# Patient Record
Sex: FEMALE | URBAN - METROPOLITAN AREA
[De-identification: names, ages, dates, MRNs, and addresses within clinical notes are randomized per-mention and may not be internally consistent; named-entity substitution may affect disease eponyms.]

---

## 2019-11-18 ENCOUNTER — INPATIENT (INPATIENT)
Facility: HOSPITAL | Age: 34
LOS: 6 days | Discharge: HOME HEALTH | DRG: 234 | End: 2019-11-25
Attending: THORACIC SURGERY (CARDIOTHORACIC VASCULAR SURGERY) | Admitting: THORACIC SURGERY (CARDIOTHORACIC VASCULAR SURGERY)
Payer: MEDICAID

## 2019-11-18 VITALS
TEMPERATURE: 97 F | SYSTOLIC BLOOD PRESSURE: 138 MMHG | DIASTOLIC BLOOD PRESSURE: 91 MMHG | OXYGEN SATURATION: 99 % | HEART RATE: 89 BPM | RESPIRATION RATE: 16 BRPM

## 2019-11-18 DIAGNOSIS — Z98.891 HISTORY OF UTERINE SCAR FROM PREVIOUS SURGERY: Chronic | ICD-10-CM

## 2019-11-18 DIAGNOSIS — Z30.2 ENCOUNTER FOR STERILIZATION: Chronic | ICD-10-CM

## 2019-11-18 DIAGNOSIS — Z98.890 OTHER SPECIFIED POSTPROCEDURAL STATES: Chronic | ICD-10-CM

## 2019-11-18 LAB
ALBUMIN SERPL ELPH-MCNC: 4.6 G/DL — SIGNIFICANT CHANGE UP (ref 3.3–5)
ALP SERPL-CCNC: 57 U/L — SIGNIFICANT CHANGE UP (ref 40–120)
ALT FLD-CCNC: 27 U/L — SIGNIFICANT CHANGE UP (ref 10–45)
ANION GAP SERPL CALC-SCNC: 13 MMOL/L — SIGNIFICANT CHANGE UP (ref 5–17)
APTT BLD: 52.5 SEC — HIGH (ref 27.5–36.3)
AST SERPL-CCNC: 25 U/L — SIGNIFICANT CHANGE UP (ref 10–40)
BASOPHILS # BLD AUTO: 0.05 K/UL — SIGNIFICANT CHANGE UP (ref 0–0.2)
BASOPHILS NFR BLD AUTO: 0.3 % — SIGNIFICANT CHANGE UP (ref 0–2)
BILIRUB SERPL-MCNC: 0.5 MG/DL — SIGNIFICANT CHANGE UP (ref 0.2–1.2)
BUN SERPL-MCNC: 13 MG/DL — SIGNIFICANT CHANGE UP (ref 7–23)
CALCIUM SERPL-MCNC: 10 MG/DL — SIGNIFICANT CHANGE UP (ref 8.4–10.5)
CHLORIDE SERPL-SCNC: 100 MMOL/L — SIGNIFICANT CHANGE UP (ref 96–108)
CHOLEST SERPL-MCNC: 230 MG/DL — HIGH (ref 10–199)
CK MB CFR SERPL CALC: 6.8 NG/ML — HIGH (ref 0–6.7)
CK SERPL-CCNC: 158 U/L — SIGNIFICANT CHANGE UP (ref 25–170)
CO2 SERPL-SCNC: 23 MMOL/L — SIGNIFICANT CHANGE UP (ref 22–31)
CREAT SERPL-MCNC: 0.39 MG/DL — LOW (ref 0.5–1.3)
CRP SERPL-MCNC: 0.84 MG/DL — HIGH (ref 0–0.4)
EOSINOPHIL # BLD AUTO: 0.2 K/UL — SIGNIFICANT CHANGE UP (ref 0–0.5)
EOSINOPHIL NFR BLD AUTO: 1.4 % — SIGNIFICANT CHANGE UP (ref 0–6)
GLUCOSE SERPL-MCNC: 233 MG/DL — HIGH (ref 70–99)
HBA1C BLD-MCNC: 11 % — HIGH (ref 4–5.6)
HCT VFR BLD CALC: 42.2 % — SIGNIFICANT CHANGE UP (ref 34.5–45)
HDLC SERPL-MCNC: 45 MG/DL — LOW
HGB BLD-MCNC: 14.9 G/DL — SIGNIFICANT CHANGE UP (ref 11.5–15.5)
IMM GRANULOCYTES NFR BLD AUTO: 0.3 % — SIGNIFICANT CHANGE UP (ref 0–1.5)
INR BLD: 1.15 — SIGNIFICANT CHANGE UP (ref 0.88–1.16)
LIPID PNL WITH DIRECT LDL SERPL: 126 MG/DL — HIGH
LYMPHOCYTES # BLD AUTO: 35 % — SIGNIFICANT CHANGE UP (ref 13–44)
LYMPHOCYTES # BLD AUTO: 5.17 K/UL — HIGH (ref 1–3.3)
MCHC RBC-ENTMCNC: 32.4 PG — SIGNIFICANT CHANGE UP (ref 27–34)
MCHC RBC-ENTMCNC: 35.3 GM/DL — SIGNIFICANT CHANGE UP (ref 32–36)
MCV RBC AUTO: 91.7 FL — SIGNIFICANT CHANGE UP (ref 80–100)
MONOCYTES # BLD AUTO: 0.71 K/UL — SIGNIFICANT CHANGE UP (ref 0–0.9)
MONOCYTES NFR BLD AUTO: 4.8 % — SIGNIFICANT CHANGE UP (ref 2–14)
NEUTROPHILS # BLD AUTO: 8.61 K/UL — HIGH (ref 1.8–7.4)
NEUTROPHILS NFR BLD AUTO: 58.2 % — SIGNIFICANT CHANGE UP (ref 43–77)
NRBC # BLD: 0 /100 WBCS — SIGNIFICANT CHANGE UP (ref 0–0)
PLATELET # BLD AUTO: 273 K/UL — SIGNIFICANT CHANGE UP (ref 150–400)
POTASSIUM SERPL-MCNC: 3.8 MMOL/L — SIGNIFICANT CHANGE UP (ref 3.5–5.3)
POTASSIUM SERPL-SCNC: 3.8 MMOL/L — SIGNIFICANT CHANGE UP (ref 3.5–5.3)
PROT SERPL-MCNC: 8.1 G/DL — SIGNIFICANT CHANGE UP (ref 6–8.3)
PROTHROM AB SERPL-ACNC: 13 SEC — HIGH (ref 10–12.9)
RBC # BLD: 4.6 M/UL — SIGNIFICANT CHANGE UP (ref 3.8–5.2)
RBC # FLD: 11.4 % — SIGNIFICANT CHANGE UP (ref 10.3–14.5)
SODIUM SERPL-SCNC: 136 MMOL/L — SIGNIFICANT CHANGE UP (ref 135–145)
TOTAL CHOLESTEROL/HDL RATIO MEASUREMENT: 5.1 RATIO — SIGNIFICANT CHANGE UP (ref 3.3–7.1)
TRIGL SERPL-MCNC: 294 MG/DL — HIGH (ref 10–149)
TROPONIN T SERPL-MCNC: 0.23 NG/ML — CRITICAL HIGH (ref 0–0.01)
WBC # BLD: 14.78 K/UL — HIGH (ref 3.8–10.5)
WBC # FLD AUTO: 14.78 K/UL — HIGH (ref 3.8–10.5)

## 2019-11-18 PROCEDURE — 93458 L HRT ARTERY/VENTRICLE ANGIO: CPT | Mod: 26

## 2019-11-18 PROCEDURE — 93010 ELECTROCARDIOGRAM REPORT: CPT

## 2019-11-18 RX ORDER — INFLUENZA VIRUS VACCINE 15; 15; 15; 15 UG/.5ML; UG/.5ML; UG/.5ML; UG/.5ML
0.5 SUSPENSION INTRAMUSCULAR ONCE
Refills: 0 | Status: DISCONTINUED | OUTPATIENT
Start: 2019-11-18 | End: 2019-11-21

## 2019-11-18 RX ORDER — INSULIN LISPRO 100/ML
VIAL (ML) SUBCUTANEOUS
Refills: 0 | Status: DISCONTINUED | OUTPATIENT
Start: 2019-11-18 | End: 2019-11-21

## 2019-11-18 RX ORDER — GLUCAGON INJECTION, SOLUTION 0.5 MG/.1ML
1 INJECTION, SOLUTION SUBCUTANEOUS ONCE
Refills: 0 | Status: DISCONTINUED | OUTPATIENT
Start: 2019-11-18 | End: 2019-11-21

## 2019-11-18 RX ORDER — DEXTROSE 50 % IN WATER 50 %
12.5 SYRINGE (ML) INTRAVENOUS ONCE
Refills: 0 | Status: DISCONTINUED | OUTPATIENT
Start: 2019-11-18 | End: 2019-11-21

## 2019-11-18 RX ORDER — ASPIRIN/CALCIUM CARB/MAGNESIUM 324 MG
81 TABLET ORAL DAILY
Refills: 0 | Status: DISCONTINUED | OUTPATIENT
Start: 2019-11-18 | End: 2019-11-21

## 2019-11-18 RX ORDER — INSULIN LISPRO 100/ML
6 VIAL (ML) SUBCUTANEOUS ONCE
Refills: 0 | Status: COMPLETED | OUTPATIENT
Start: 2019-11-18 | End: 2019-11-18

## 2019-11-18 RX ORDER — SODIUM CHLORIDE 9 MG/ML
1000 INJECTION INTRAMUSCULAR; INTRAVENOUS; SUBCUTANEOUS
Refills: 0 | Status: DISCONTINUED | OUTPATIENT
Start: 2019-11-18 | End: 2019-11-19

## 2019-11-18 RX ORDER — PANTOPRAZOLE SODIUM 20 MG/1
40 TABLET, DELAYED RELEASE ORAL
Refills: 0 | Status: DISCONTINUED | OUTPATIENT
Start: 2019-11-18 | End: 2019-11-21

## 2019-11-18 RX ORDER — LISINOPRIL 2.5 MG/1
2.5 TABLET ORAL DAILY
Refills: 0 | Status: DISCONTINUED | OUTPATIENT
Start: 2019-11-19 | End: 2019-11-19

## 2019-11-18 RX ORDER — POTASSIUM CHLORIDE 20 MEQ
20 PACKET (EA) ORAL ONCE
Refills: 0 | Status: COMPLETED | OUTPATIENT
Start: 2019-11-18 | End: 2019-11-18

## 2019-11-18 RX ORDER — CHLORHEXIDINE GLUCONATE 213 G/1000ML
1 SOLUTION TOPICAL ONCE
Refills: 0 | Status: COMPLETED | OUTPATIENT
Start: 2019-11-18 | End: 2019-11-20

## 2019-11-18 RX ORDER — DEXTROSE 50 % IN WATER 50 %
25 SYRINGE (ML) INTRAVENOUS ONCE
Refills: 0 | Status: DISCONTINUED | OUTPATIENT
Start: 2019-11-18 | End: 2019-11-21

## 2019-11-18 RX ORDER — SODIUM CHLORIDE 9 MG/ML
1000 INJECTION, SOLUTION INTRAVENOUS
Refills: 0 | Status: DISCONTINUED | OUTPATIENT
Start: 2019-11-18 | End: 2019-11-19

## 2019-11-18 RX ORDER — CLOPIDOGREL BISULFATE 75 MG/1
225 TABLET, FILM COATED ORAL ONCE
Refills: 0 | Status: COMPLETED | OUTPATIENT
Start: 2019-11-18 | End: 2019-11-18

## 2019-11-18 RX ORDER — SODIUM CHLORIDE 9 MG/ML
500 INJECTION INTRAMUSCULAR; INTRAVENOUS; SUBCUTANEOUS
Refills: 0 | Status: DISCONTINUED | OUTPATIENT
Start: 2019-11-18 | End: 2019-11-19

## 2019-11-18 RX ORDER — DEXTROSE 50 % IN WATER 50 %
15 SYRINGE (ML) INTRAVENOUS ONCE
Refills: 0 | Status: DISCONTINUED | OUTPATIENT
Start: 2019-11-18 | End: 2019-11-21

## 2019-11-18 RX ORDER — ATORVASTATIN CALCIUM 80 MG/1
80 TABLET, FILM COATED ORAL AT BEDTIME
Refills: 0 | Status: DISCONTINUED | OUTPATIENT
Start: 2019-11-18 | End: 2019-11-21

## 2019-11-18 RX ORDER — METOPROLOL TARTRATE 50 MG
25 TABLET ORAL
Refills: 0 | Status: DISCONTINUED | OUTPATIENT
Start: 2019-11-18 | End: 2019-11-21

## 2019-11-18 RX ADMIN — Medication 20 MILLIEQUIVALENT(S): at 19:31

## 2019-11-18 RX ADMIN — Medication 81 MILLIGRAM(S): at 17:15

## 2019-11-18 RX ADMIN — SODIUM CHLORIDE 75 MILLILITER(S): 9 INJECTION INTRAMUSCULAR; INTRAVENOUS; SUBCUTANEOUS at 18:45

## 2019-11-18 RX ADMIN — Medication 4: at 18:55

## 2019-11-18 RX ADMIN — CLOPIDOGREL BISULFATE 225 MILLIGRAM(S): 75 TABLET, FILM COATED ORAL at 17:15

## 2019-11-18 RX ADMIN — ATORVASTATIN CALCIUM 80 MILLIGRAM(S): 80 TABLET, FILM COATED ORAL at 22:02

## 2019-11-18 RX ADMIN — PANTOPRAZOLE SODIUM 40 MILLIGRAM(S): 20 TABLET, DELAYED RELEASE ORAL at 22:02

## 2019-11-18 RX ADMIN — Medication 6 UNIT(S): at 22:25

## 2019-11-18 NOTE — H&P ADULT - RS GEN PE MLT RESP DETAILS PC
no intercostal retractions/no rales/no chest wall tenderness/airway obstructed/airway patent/clear to auscultation bilaterally/no subcutaneous emphysema/breath sounds equal/normal/respirations non-labored/no rhonchi/no wheezes/good air movement

## 2019-11-18 NOTE — H&P ADULT - ASSESSMENT
35 y/o F FORMER SMOKER with PMH of  Type 2 DM, arthritis, GERD who is now transferred to [facility] for cardiac cath  2/2 pt. risk factors, CCS class 4  sx and R/I NSTEMI.    H/H . Pt denies bleeding, GI bleeding, hematemesis, hematuria, BRBPR or melena . ASA … and Plavix … pre-cath.  Cr. IV NS@ cc/hr pre-cath.  Risks & benefits of procedure and alternative therapy have been explained to the patient including but not limited to: allergic reaction, bleeding w/possible need for blood transfusion, infection, renal and vascular compromise, limb damage, arrhythmia, stroke, vessel dissection/perforation, Myocardial infarction, emergent CABG. Informed consent obtained and in chart 35 y/o F FORMER SMOKER with PMH of  Type 2 DM, arthritis, GERD who is now transferred to [facility] for cardiac cath  2/2 pt. risk factors, CCS class 4  sx and R/I NSTEMI.    H/H . Pt denies bleeding, GI bleeding, hematemesis, hematuria, BRBPR or melena .At [facility]; Pt.  loaded with  mg PO X 1 and Plavix 300 mg PO X 1 on 11/17/19 and  received  ASA 81 ng PO x1 and Plavix 75 mg PO x 1 today am; will give aditional ASA 81 mg PO X 1 and Plavix 225 mg Po x 1 to complete loading dose  Cr. IV NS@ 75 cc/hr pre-cath.  At [facility];  H/H 14.4/41.5; INR 1.01; Cr 0.44 today am at [facility].   Risks & benefits of procedure and alternative therapy have been explained to the patient including but not limited to: allergic reaction, bleeding w/possible need for blood transfusion, infection, renal and vascular compromise, limb damage, arrhythmia, stroke, vessel dissection/perforation, Myocardial infarction, emergent CABG. Informed consent obtained and in chart 33 y/o F FORMER SMOKER with PMH of  Type 2 DM, arthritis, GERD who is now transferred to [facility] for cardiac cath  2/2 pt. risk factors, CCS class 4  sx and R/I NSTEMI.    H/H 14.9/42.2 . Pt denies bleeding, GI bleeding, hematemesis, hematuria, BRBPR or melena .At [facility]; Pt. loaded with  mg PO X 1 and Plavix 300 mg PO X 1 on 11/17/19 and  received  ASA 81 ng PO x1 and Plavix 75 mg PO x 1 today am; pt. given aditional ASA 81 mg PO X 1 and Plavix 225 mg Po x 1 to complete loading dose  Cr. 0.39; IV NS@ 75 cc/hr pre-cath.  WBC 14; but pt. afebrile and asx. Dr. Lucero aware.  urine pregnancy test negative at [facility]    Risks & benefits of procedure and alternative therapy have been explained to the patient including but not limited to: allergic reaction, bleeding w/possible need for blood transfusion, infection, renal and vascular compromise, limb damage, arrhythmia, stroke, vessel dissection/perforation, Myocardial infarction, emergent CABG. Informed consent obtained and in chart

## 2019-11-18 NOTE — H&P ADULT - NSHPLABSRESULTS_GEN_ALL_CORE
14.9   14.78 )-----------( 273      ( 18 Nov 2019 17:02 )             42.2   11-18    136  |  100  |  13  ----------------------------<  233<H>  3.8   |  23  |  0.39<L>    Ca    10.0      18 Nov 2019 17:02    TPro  8.1  /  Alb  4.6  /  TBili  0.5  /  DBili  <0.2  /  AST  25  /  ALT  27  /  AlkPhos  57  11-18  PT/INR - ( 18 Nov 2019 17:02 )   PT: 13.0 sec;   INR: 1.15          PTT - ( 18 Nov 2019 17:02 )  PTT:52.5 sec

## 2019-11-18 NOTE — H&P ADULT - NSICDXPASTMEDICALHX_GEN_ALL_CORE_FT
PAST MEDICAL HISTORY:  Type 2 diabetes mellitus Diabetes PAST MEDICAL HISTORY:  H/O gastroesophageal reflux (GERD)     Type 2 diabetes mellitus Diabetes

## 2019-11-18 NOTE — H&P ADULT - NSICDXFAMILYHX_GEN_ALL_CORE_FT
FAMILY HISTORY:  FH: CAD (coronary artery disease), mom; grandmother FAMILY HISTORY:  Family history of CABG, mom (age 50s)  FH: CAD (coronary artery disease), mom; grandmother  FH: myocardial infarction, mom: age 40s s/p PCI

## 2019-11-18 NOTE — H&P ADULT - HISTORY OF PRESENT ILLNESS
SKELETON !!!    35 y/o F FORMER SMOKER with PMH of  Type 2 DM, arthritis, GERD who initially presented to [facility] 11/17/19 c/o CP X 1 day. Pt. reports that the CP is "pressure" like sensation radiating to b/l UE which is associated with SOB; rates it as 10/10 which started the night before she went to [facility]. At baseline; pt. reports of having CP on exertion for the past 2-3 years but is now having CP at rest which is relieved with SLG NTG  Pt denies dizziness, diaphoresis, fatigue, LE edema, palpitations, orthopnea, PND, syncope  At [facility] ; pt ruled in NSTEMI and was admitted for further management of NSTEMI. Troponin I 0.2333> 3.370; + THC in urine; Hg 13.6; negative urine test for pregnancy; EKG as per [facility] NSR with no ST-T changes noted; At [place] pt was started on Hep gtt for NSTEMI; started on ASA 81 ng daily; Plavix 75 mg daily and Lipitor 80 mg daily; ? ASA/Plavix load.   Pt is now transferred to [facility] for cardiac cath  2/2 pt. risk factors, CCS class 4  sx and R/I NSTEMI.    On arrival to [facility]; Pt denies … CP, SOB, dizziness, diaphoresis, fatigue, LE edema, palpitations, orthopnea, PND, syncope  BP, HR, RR, T, O2  EKG: SKELETON !!!    35 y/o F FORMER SMOKER with PMH of  Type 2 DM, arthritis, GERD who initially presented to [facility] 11/17/19 c/o CP X 1 day. Pt. reports that the CP is "pressure" like sensation radiating to b/l UE which is associated with SOB; rates it as 10/10 which started the night before she went to [facility]. At baseline; pt. reports of having CP on exertion for the past 2-3 years but is now having CP at rest which is relieved with SLG NTG  Pt denies dizziness, diaphoresis, fatigue, LE edema, palpitations, orthopnea, PND, syncope  At [facility] ; pt ruled in NSTEMI and was admitted for further management of NSTEMI. Troponin I 0.2333> 3.370> 5.3> 8.73 >5.6; + THC in urine; Hg 13.6; negative urine test for pregnancy; EKG as per [facility] NSR with no ST-T changes noted; At [facility] pt was started on Hep gtt for NSTEMI; loaded with  mg PO X 1 and Plavix 300 mg PO X 1 on 11/17/19 and  started on ASA 81 ng daily; Plavix 75 mg daily; Lopressor 25 mg BID; Lisinopril 2.5 mg daily and Lipitor 80 mg daily.   Pt is now transferred to [facility] for cardiac cath  2/2 pt. risk factors, CCS class 4  sx and R/I NSTEMI.    On arrival to [facility]; Pt denies … CP, SOB, dizziness, diaphoresis, fatigue, LE edema, palpitations, orthopnea, PND, syncope  BP, HR, RR, T, O2  EKG: SKELETON !!!    35 y/o F  CURRENT SMOKER with strong FHX of CAD; PMH of  Type 2 DM, arthritis, GERD who initially presented to [facility] 11/17/19 c/o CP X 1 day. Pt. reports that the CP is "pressure" like sensation radiating to b/l UE which is associated with SOB; rates it as 10/10 which started the night before she went to [facility]. At baseline; pt. reports of having CP on exertion for the past 2-3 years but is now having CP at rest which is relieved with SLG NTG  Pt denies dizziness, diaphoresis, fatigue, LE edema, palpitations, orthopnea, PND, syncope  At [facility] ; pt ruled in NSTEMI and was admitted for further management of NSTEMI. Troponin I 0.2333> 3.370> 5.3> 8.73 >5.6; + THC in urine; Hg 13.6; negative urine test for pregnancy; EKG as per [facility] NSR with no ST-T changes noted; At [place] pt was started on Hep gtt for NSTEMI; loaded with  mg PO X 1 and Plavix 300 mg PO X 1 on 11/17/19 and  started on ASA 81 ng daily; Plavix 75 mg daily; Lopressor 25 mg BID; Lisinopril 2.5 mg daily and Lipitor 80 mg daily.   Pt is now transferred to [facility] for cardiac cath  2/2 pt. risk factors, CCS class 4  sx and R/I NSTEMI.    On arrival to [facility]; Pt denies … CP, SOB, dizziness, diaphoresis, fatigue, LE edema, palpitations, orthopnea, PND, syncope  /91, HR 89, RR 16, T 97.2 O2 99 RA  EKG:  NSR @ 74 bpm; q wave inferior leads; LAD; no ST-T changes noted 33 y/o F  CURRENT SMOKER' + Marijuana use with strong FHX of CAD ( MI mom at age 40; CABG mom at age 50); PMH of  Type 2 DM, arthritis, GERD who initially presented to [facility] 11/17/19 c/o CP X 1 day. Pt. reports that Saturday night; she had severe CP that woke her up from her sleep; pt. reports that the CP was mid-sternal; "pressure-burning" like sensation radiating to B/L UE associated with SOB; rates it as 10/10; not worse with inspiration or movement. She first thought that it might be 2/2 her GERD; took protonix and milk with no improvement in her sx which prompted her to go to the ED.  At baseline; pt. reports of having CP associated with SOB on climbing 2-3 flights of stairs which is worse with exertion and relieved with rest occuring for the last few months; She reports that her PCP was supposed to give her referral to see a cardiologist.  Pt denies dizziness, diaphoresis, fatigue, LE edema, palpitations, orthopnea, PND, syncope At [facility] ; pt ruled in NSTEMI and was admitted for further management of NSTEMI. Troponin I 0.2333> 3.370> 5.3> 8.73 >5.6; + THC in urine; Hg 13.6; negative urine test for pregnancy; EKG as per [place] paperwork NSR with no ST-T changes noted; At [place] pt was started on Hep gtt for NSTEMI; loaded with  mg PO X 1 and Plavix 300 mg PO X 1 on 11/17/19 and  started on ASA 81 ng daily; Plavix 75 mg daily; Lopressor 25 mg BID; Lisinopril 2.5 mg daily and Lipitor 80 mg daily.  Pt is now transferred to [facility] for cardiac cath  2/2 pt. risk factors, CCS class 4  sx and R/I NSTEMI.  On arrival to [facility]; Pt denies any CP, SOB, dizziness, diaphoresis, fatigue, LE edema, palpitations, orthopnea, PND, syncope; recent sick contacts/illness; URI or UTI sx. /91, HR 89, RR 16, T 97.2 O2 99 RA; EKG:  NSR @ 74 bpm; q wave inferior leads; LAD; no ST-T changes noted.   Transfer meds: ASA 81 mg daily; Plavix 75 mg daily; Lipitor 80 mg daily; Lisinopril 2.5 mg daily and Lopressor 25 mg BID.

## 2019-11-18 NOTE — H&P ADULT - ATTENDING COMMENTS
See PA note written above, for details. I reviewed the PA documentation.  I reviewed vitals, labs, medications, cardiac studies and additional imaging.  I agree with the PA's findings and plans as written above with the following additions/amendments:  34F FORMER SMOKER with Uncontrolled Type 2 DM HbA1c 11%, and GERD who transferred from [place] for management of NSTEMI.  [facility] with multivessel CAD. CTSx consulted.  ROS: Patient denies cardiopulmonary symptoms. The patient specifically denies CP, SOB, MALONEY, LH, dizziness, palpitations.  Physical Exam notable for: young female sitting up in bed in NAD, flat neck veins, RRR, no MGR detected, clear lungs, overly nourished abdomen, NTTP, no pretibial pitting edema, no ankle edema, skin WWP throughout, A&Ox3    Plan for:  Heparin gtt pending CTsx  ASA 81mg daily  High intensity statin Atorva 80  Metoprolol 25 BID  Obtain TTE for structural assessment  Pre operative testing ordered  CTSx consulting for CABG evaluation  Endocrine consultation for uncontrolled DM A1c 11%  Smoking cessation counseling  Patient to be referred to cardiac rehabilitation upon discharge for cardiac conditioning  JAMESON Ness.  Cardiology Attending

## 2019-11-18 NOTE — H&P ADULT - NSHPSOCIALHISTORY_GEN_ALL_CORE
former smoker current smoker; smoked 1 PPD X 15 years  smokes marijuana occasionally; last use 11/16/19  denies any ETOH use

## 2019-11-19 DIAGNOSIS — E78.5 HYPERLIPIDEMIA, UNSPECIFIED: ICD-10-CM

## 2019-11-19 DIAGNOSIS — F17.200 NICOTINE DEPENDENCE, UNSPECIFIED, UNCOMPLICATED: ICD-10-CM

## 2019-11-19 DIAGNOSIS — E11.9 TYPE 2 DIABETES MELLITUS WITHOUT COMPLICATIONS: ICD-10-CM

## 2019-11-19 DIAGNOSIS — Z29.9 ENCOUNTER FOR PROPHYLACTIC MEASURES, UNSPECIFIED: ICD-10-CM

## 2019-11-19 DIAGNOSIS — I21.4 NON-ST ELEVATION (NSTEMI) MYOCARDIAL INFARCTION: ICD-10-CM

## 2019-11-19 DIAGNOSIS — I10 ESSENTIAL (PRIMARY) HYPERTENSION: ICD-10-CM

## 2019-11-19 LAB
ANION GAP SERPL CALC-SCNC: 15 MMOL/L — SIGNIFICANT CHANGE UP (ref 5–17)
APTT BLD: 42.4 SEC — HIGH (ref 27.5–36.3)
APTT BLD: 51.7 SEC — HIGH (ref 27.5–36.3)
APTT BLD: 90.9 SEC — HIGH (ref 27.5–36.3)
BUN SERPL-MCNC: 15 MG/DL — SIGNIFICANT CHANGE UP (ref 7–23)
CALCIUM SERPL-MCNC: 9.3 MG/DL — SIGNIFICANT CHANGE UP (ref 8.4–10.5)
CHLORIDE SERPL-SCNC: 100 MMOL/L — SIGNIFICANT CHANGE UP (ref 96–108)
CO2 SERPL-SCNC: 21 MMOL/L — LOW (ref 22–31)
CREAT SERPL-MCNC: 0.44 MG/DL — LOW (ref 0.5–1.3)
GLUCOSE BLDC GLUCOMTR-MCNC: 208 MG/DL — HIGH (ref 70–99)
GLUCOSE BLDC GLUCOMTR-MCNC: 226 MG/DL — HIGH (ref 70–99)
GLUCOSE BLDC GLUCOMTR-MCNC: 264 MG/DL — HIGH (ref 70–99)
GLUCOSE SERPL-MCNC: 272 MG/DL — HIGH (ref 70–99)
HBA1C BLD-MCNC: 10.6 % — HIGH (ref 4–5.6)
HCT VFR BLD CALC: 43.7 % — SIGNIFICANT CHANGE UP (ref 34.5–45)
HGB BLD-MCNC: 15 G/DL — SIGNIFICANT CHANGE UP (ref 11.5–15.5)
MAGNESIUM SERPL-MCNC: 1.9 MG/DL — SIGNIFICANT CHANGE UP (ref 1.6–2.6)
MCHC RBC-ENTMCNC: 32.3 PG — SIGNIFICANT CHANGE UP (ref 27–34)
MCHC RBC-ENTMCNC: 34.3 GM/DL — SIGNIFICANT CHANGE UP (ref 32–36)
MCV RBC AUTO: 94.2 FL — SIGNIFICANT CHANGE UP (ref 80–100)
NRBC # BLD: 0 /100 WBCS — SIGNIFICANT CHANGE UP (ref 0–0)
PLATELET # BLD AUTO: 280 K/UL — SIGNIFICANT CHANGE UP (ref 150–400)
POTASSIUM SERPL-MCNC: 3.8 MMOL/L — SIGNIFICANT CHANGE UP (ref 3.5–5.3)
POTASSIUM SERPL-SCNC: 3.8 MMOL/L — SIGNIFICANT CHANGE UP (ref 3.5–5.3)
RBC # BLD: 4.64 M/UL — SIGNIFICANT CHANGE UP (ref 3.8–5.2)
RBC # FLD: 11.5 % — SIGNIFICANT CHANGE UP (ref 10.3–14.5)
SODIUM SERPL-SCNC: 136 MMOL/L — SIGNIFICANT CHANGE UP (ref 135–145)
TSH SERPL-MCNC: 1.18 UIU/ML — SIGNIFICANT CHANGE UP (ref 0.35–4.94)
WBC # BLD: 11.74 K/UL — HIGH (ref 3.8–10.5)
WBC # FLD AUTO: 11.74 K/UL — HIGH (ref 3.8–10.5)

## 2019-11-19 PROCEDURE — 93880 EXTRACRANIAL BILAT STUDY: CPT | Mod: 26

## 2019-11-19 PROCEDURE — 99223 1ST HOSP IP/OBS HIGH 75: CPT

## 2019-11-19 PROCEDURE — 94010 BREATHING CAPACITY TEST: CPT | Mod: 26

## 2019-11-19 PROCEDURE — 71046 X-RAY EXAM CHEST 2 VIEWS: CPT | Mod: 26

## 2019-11-19 PROCEDURE — 93010 ELECTROCARDIOGRAM REPORT: CPT

## 2019-11-19 RX ORDER — HEPARIN SODIUM 5000 [USP'U]/ML
INJECTION INTRAVENOUS; SUBCUTANEOUS
Qty: 25000 | Refills: 0 | Status: DISCONTINUED | OUTPATIENT
Start: 2019-11-19 | End: 2019-11-20

## 2019-11-19 RX ORDER — POTASSIUM CHLORIDE 20 MEQ
40 PACKET (EA) ORAL ONCE
Refills: 0 | Status: COMPLETED | OUTPATIENT
Start: 2019-11-19 | End: 2019-11-19

## 2019-11-19 RX ORDER — HEPARIN SODIUM 5000 [USP'U]/ML
5600 INJECTION INTRAVENOUS; SUBCUTANEOUS EVERY 6 HOURS
Refills: 0 | Status: DISCONTINUED | OUTPATIENT
Start: 2019-11-19 | End: 2019-11-20

## 2019-11-19 RX ORDER — INSULIN GLARGINE 100 [IU]/ML
24 INJECTION, SOLUTION SUBCUTANEOUS AT BEDTIME
Refills: 0 | Status: DISCONTINUED | OUTPATIENT
Start: 2019-11-19 | End: 2019-11-20

## 2019-11-19 RX ORDER — INSULIN LISPRO 100/ML
8 VIAL (ML) SUBCUTANEOUS
Refills: 0 | Status: DISCONTINUED | OUTPATIENT
Start: 2019-11-19 | End: 2019-11-20

## 2019-11-19 RX ORDER — MAGNESIUM OXIDE 400 MG ORAL TABLET 241.3 MG
400 TABLET ORAL ONCE
Refills: 0 | Status: COMPLETED | OUTPATIENT
Start: 2019-11-19 | End: 2019-11-19

## 2019-11-19 RX ORDER — HEPARIN SODIUM 5000 [USP'U]/ML
5000 INJECTION INTRAVENOUS; SUBCUTANEOUS ONCE
Refills: 0 | Status: DISCONTINUED | OUTPATIENT
Start: 2019-11-19 | End: 2019-11-19

## 2019-11-19 RX ADMIN — INSULIN GLARGINE 24 UNIT(S): 100 INJECTION, SOLUTION SUBCUTANEOUS at 22:03

## 2019-11-19 RX ADMIN — Medication 6: at 07:24

## 2019-11-19 RX ADMIN — MAGNESIUM OXIDE 400 MG ORAL TABLET 400 MILLIGRAM(S): 241.3 TABLET ORAL at 09:08

## 2019-11-19 RX ADMIN — Medication 8 UNIT(S): at 17:06

## 2019-11-19 RX ADMIN — ATORVASTATIN CALCIUM 80 MILLIGRAM(S): 80 TABLET, FILM COATED ORAL at 21:49

## 2019-11-19 RX ADMIN — HEPARIN SODIUM 1400 UNIT(S)/HR: 5000 INJECTION INTRAVENOUS; SUBCUTANEOUS at 15:35

## 2019-11-19 RX ADMIN — HEPARIN SODIUM 1200 UNIT(S)/HR: 5000 INJECTION INTRAVENOUS; SUBCUTANEOUS at 07:48

## 2019-11-19 RX ADMIN — HEPARIN SODIUM 1200 UNIT(S)/HR: 5000 INJECTION INTRAVENOUS; SUBCUTANEOUS at 22:54

## 2019-11-19 RX ADMIN — Medication 4: at 17:06

## 2019-11-19 RX ADMIN — Medication 6: at 14:46

## 2019-11-19 RX ADMIN — Medication 81 MILLIGRAM(S): at 11:29

## 2019-11-19 RX ADMIN — Medication 4: at 22:03

## 2019-11-19 RX ADMIN — Medication 25 MILLIGRAM(S): at 06:20

## 2019-11-19 RX ADMIN — HEPARIN SODIUM 0 UNIT(S)/HR: 5000 INJECTION INTRAVENOUS; SUBCUTANEOUS at 22:20

## 2019-11-19 RX ADMIN — Medication 25 MILLIGRAM(S): at 17:06

## 2019-11-19 RX ADMIN — PANTOPRAZOLE SODIUM 40 MILLIGRAM(S): 20 TABLET, DELAYED RELEASE ORAL at 06:21

## 2019-11-19 RX ADMIN — HEPARIN SODIUM 1000 UNIT(S)/HR: 5000 INJECTION INTRAVENOUS; SUBCUTANEOUS at 01:33

## 2019-11-19 RX ADMIN — Medication 40 MILLIEQUIVALENT(S): at 09:08

## 2019-11-19 NOTE — CONSULT NOTE ADULT - ATTENDING COMMENTS
A/P: 34y Female with hx of DM presenting for management of CAD undergoing eval for possible CABG    1.  DM - likely type 2, uncontrolled, possible underlying contributors  start 24 units lantus at bedtime, 8 units lispro TID with meals.   Lispro moderate dose scale with meals and at bedtime.   Continue consistent carb diet, Nutrition consult  dilute all medications in NS instead of D5 when possible.   Ensure correct injection and FSG technique    2.  Hyperlipidemia - LDL goal < 70  atorvastatin 80 mg daily  consider PSK-9 inhibitor.     3.  Obesity - outpatient medical management.   would consider GLP-1 agonist in this patient.     4.  Irregular menses/hirsutism - concerning for PCOS, cushings, CAH and likely contributing to uncontrolled DM and insulin resistance.   - check FSH, LH, DHEAS, testosterone, 17-OH progesterone, cortisol, IGF-1    Pt is advised to follow up with me at discharge.  Please call  to schedule an appointment.

## 2019-11-19 NOTE — CONSULT NOTE ADULT - SUBJECTIVE AND OBJECTIVE BOX
HPI: 34yFemale    PMH & Surgical Hx:NSTEMI  Family history of CABG  FH: myocardial infarction  FH: CAD (coronary artery disease)  Handoff  MEWS Score  H/O gastroesophageal reflux (GERD)  Type 2 diabetes mellitus  Prophylactic measure  HLD (hyperlipidemia)  Smoking  HTN (hypertension)  Type 2 diabetes mellitus  NSTEMI (non-ST elevated myocardial infarction)  Encounter for Essure implantation  H/O hernia repair  S/P       FH:  DM:  Thyroid:  Autoimmune:  Other:    SH:  Smoking:  Etoh:  Recreational Drugs:  Social Life:    Current Meds:  aspirin enteric coated 81 milliGRAM(s) Oral daily  atorvastatin 80 milliGRAM(s) Oral at bedtime  chlorhexidine 4% Liquid 1 Application(s) Topical once  dextrose 40% Gel 15 Gram(s) Oral once PRN  dextrose 50% Injectable 12.5 Gram(s) IV Push once  dextrose 50% Injectable 25 Gram(s) IV Push once  dextrose 50% Injectable 25 Gram(s) IV Push once  glucagon  Injectable 1 milliGRAM(s) IntraMuscular once PRN  heparin  Infusion.  Unit(s)/Hr IV Continuous <Continuous>  heparin  Injectable 5600 Unit(s) IV Push every 6 hours PRN  influenza   Vaccine 0.5 milliLiter(s) IntraMuscular once  insulin glargine Injectable (LANTUS) 24 Unit(s) SubCutaneous at bedtime  insulin lispro (HumaLOG) corrective regimen sliding scale   SubCutaneous Before meals and at bedtime  insulin lispro Injectable (HumaLOG) 8 Unit(s) SubCutaneous three times a day with meals  metoprolol tartrate 25 milliGRAM(s) Oral two times a day  pantoprazole    Tablet 40 milliGRAM(s) Oral before breakfast      Allergies:  No Known Allergies      ROS:  Denies the following except as indicated.    General: weight loss/weight gain, decreased appetite, fatigue  Eyes: Blurry vision, double vision, visual changes  ENT: Throat pain, changes in voice,   CV: palpitations, SOB, CP, cough  GI: NVD, difficulty swallowing, abdominal pain  : polyuria, dysuria  Endo: abnormal menses, temperature intolerance, decreased libido  MSK: weakness, joint pain  Skin: rash, dryness, diaphoresis  Heme: Easy bruising,bleeding  Neuro: HA, dizziness, lightheadedness, numbness tingling  Psych: Anxiety, Depression    Vital Signs Last 24 Hrs  T(C): 36.8 (2019 19:08), Max: 36.8 (2019 10:18)  T(F): 98.2 (2019 19:08), Max: 98.3 (2019 10:18)  HR: 92 (2019 20:34) (76 - 100)  BP: 108/72 (2019 20:34) (101/57 - 126/75)  BP(mean): --  RR: 16 (2019 20:34) (16 - 18)  SpO2: 98% (2019 20:34) (97% - 98%)  Height (cm): 175 ( @ 21:23)  Weight (kg): 93 ( @ 21:23)  BMI (kg/m2): 30.4 ( @ 21:23)    Constitutional: wn/wd in NAD.   HEENT: NCAT, MMM, OP clear, EOMI, , no proptosis or lid retraction  Neck: no thyromegaly or palpable thyroid nodules   Respiratory: lungs CTAB.  Cardiovascular: regular rhythm, normal S1 and S2, no audible murmurs  GI: soft, NT/ND, no masses/HSM appreciated.  Neurology: no tremors, DTR 2+  Skin: no visible rashes/lesions  Psychiatric: AAO x 3, normal affect/mood.  Ext: radial pulses intact, DP pulses intact, extremities warm, no cyanosis, clubbing or edema.       LABS:                        15.0   11.74 )-----------( 280      ( 2019 06:36 )             43.7         136  |  100  |  15  ----------------------------<  272<H>  3.8   |  21<L>  |  0.44<L>    Ca    9.3      2019 06:36  Mg     1.9         TPro  8.1  /  Alb  4.6  /  TBili  0.5  /  DBili  <0.2  /  AST  25  /  ALT  27  /  AlkPhos  57      PT/INR - ( 2019 17:02 )   PT: 13.0 sec;   INR: 1.15          PTT - ( 2019 15:02 )  PTT:51.7 sec    Hemoglobin A1C, Whole Blood: 10.6 ( @ 06:36)  Hemoglobin A1C, Whole Blood: 11.0 ( @ 17:02)    Thyroid Stimulating Hormone, Serum: 1.177 ( @ 06:36)  Thyroid Stimulating Hormone, Serum: 1.218 ( 17:02)    Cholesterol, Serum: 230 mg/dL (19 17:02)  HDL Cholesterol, Serum: 45 mg/dL (19 17:02)  Triglycerides, Serum: 294 mg/dL (19 17:02)  Direct LDL: 126 mg/dL (19 @ 17:02)      CAPILLARY BLOOD GLUCOSE      POCT Blood Glucose.: 226 mg/dL (2019 21:36)  POCT Blood Glucose.: 208 mg/dL (2019 17:00)  POCT Blood Glucose.: 264 mg/dL (2019 14:05)  POCT Blood Glucose.: 265 mg/dL (2019 06:50) HPI:  35 y/o F  CURRENT SMOKER' + Marijuana use with strong FHX of CAD ( MI mom at age 40; CABG mom at age 50); PMH of  Type 2 DM, arthritis, GERD who initially presented to [facility] 19 c/o CP X 1 day. Pt. reports that Saturday night; she had severe CP that woke her up from her sleep; pt. reports that the CP was mid-sternal; "pressure-burning" like sensation radiating to B/L UE associated with SOB; rates it as 10/10; not worse with inspiration or movement. She first thought that it might be 2/2 her GERD; took protonix and milk with no improvement in her sx which prompted her to go to the ED.  At baseline; pt. reports of having CP associated with SOB on climbing 2-3 flights of stairs which is worse with exertion and relieved with rest occuring for the last few months; She reports that her PCP was supposed to give her referral to see a cardiologist.  Pt denies dizziness, diaphoresis, fatigue, LE edema, palpitations, orthopnea, PND, syncope At [facility] ; pt ruled in NSTEMI and was admitted for further management of NSTEMI. Troponin I 0.2333> 3.370> 5.3> 8.73 >5.6; + THC in urine; Hg 13.6; negative urine test for pregnancy; EKG as per [facility] paperwork NSR with no ST-T changes noted; At [facility] pt was started on Hep gtt for NSTEMI; loaded with  mg PO X 1 and Plavix 300 mg PO X 1 on 19 and  started on ASA 81 ng daily; Plavix 75 mg daily; Lopressor 25 mg BID; Lisinopril 2.5 mg daily and Lipitor 80 mg daily.  Pt is now transferred to [facility] for cardiac cath  2/2 pt. risk factors, CCS class 4  sx and R/I NSTEMI.  On arrival to [facility]; Pt denies any CP, SOB, dizziness, diaphoresis, fatigue, LE edema, palpitations, orthopnea, PND, syncope; recent sick contacts/illness; URI or UTI sx. /91, HR 89, RR 16, T 97.2 O2 99 RA; EKG:  NSR @ 74 bpm; q wave inferior leads; LAD; no ST-T changes noted.   Transfer meds: ASA 81 mg daily; Plavix 75 mg daily; Lipitor 80 mg daily; Lisinopril 2.5 mg daily and Lopressor 25 mg BID. (2019 11:45)  Pt reports was dx with type 2 DM at age 16, has been on metformin and glipizide at the moment, but required insulin during her prenancies 9 and 10 years ago.  Pt endorses frequent missed doses of medication as she feels that the metformin does not agree with her.  does not check glucose at home.  reports adherence to low carb diet, and regular physical activity.  denies retinopathy, neuropathy, hx of HHS/DKA/hypoglycemia requiring assistance.  Reports she has lost weight over the past few years, but was previously close to 300lbs.   Pt also reports irregular periods, difficulty losing weight, no fractures, no hx of thyroid disorders  Insulin requirements this admission reviewed  pt eating well today      PMH & Surgical Hx:NSTEMI  H/O gastroesophageal reflux (GERD)  Type 2 diabetes mellitus  HLD (hyperlipidemia)  Smoking  HTN (hypertension)  NSTEMI (non-ST elevated myocardial infarction)  Encounter for Essure implantation  H/O hernia repair  S/P     FH:  DM: in siblings  Thyroid: in sister  Autoimmune: denies  Other:    SH:  Smoking: current smoker.  Etoh: denies  Recreational Drugs: + marijuana  Social Life: works as a , two kids    Current Meds:  aspirin enteric coated 81 milliGRAM(s) Oral daily  atorvastatin 80 milliGRAM(s) Oral at bedtime  chlorhexidine 4% Liquid 1 Application(s) Topical once  dextrose 40% Gel 15 Gram(s) Oral once PRN  dextrose 50% Injectable 12.5 Gram(s) IV Push once  dextrose 50% Injectable 25 Gram(s) IV Push once  dextrose 50% Injectable 25 Gram(s) IV Push once  glucagon  Injectable 1 milliGRAM(s) IntraMuscular once PRN  heparin  Infusion.  Unit(s)/Hr IV Continuous <Continuous>  heparin  Injectable 5600 Unit(s) IV Push every 6 hours PRN  influenza   Vaccine 0.5 milliLiter(s) IntraMuscular once  insulin glargine Injectable (LANTUS) 24 Unit(s) SubCutaneous at bedtime  insulin lispro (HumaLOG) corrective regimen sliding scale   SubCutaneous Before meals and at bedtime  insulin lispro Injectable (HumaLOG) 8 Unit(s) SubCutaneous three times a day with meals  metoprolol tartrate 25 milliGRAM(s) Oral two times a day  pantoprazole    Tablet 40 milliGRAM(s) Oral before breakfast      Allergies:  No Known Allergies      ROS:  Denies the following except as indicated.    General: weight loss/weight gain, decreased appetite, fatigue  Eyes: Blurry vision, double vision, visual changes  ENT: Throat pain, changes in voice,   CV: palpitations, SOB, CP, cough  GI: NVD, difficulty swallowing, abdominal pain  : polyuria, dysuria  Endo: abnormal menses, temperature intolerance, decreased libido  MSK: weakness, joint pain  Skin: rash, dryness, diaphoresis  Heme: Easy bruising,bleeding  Neuro: HA, dizziness, lightheadedness, numbness tingling  Psych: Anxiety, Depression    Vital Signs Last 24 Hrs  T(C): 36.8 (2019 19:08), Max: 36.8 (2019 10:18)  T(F): 98.2 (2019 19:08), Max: 98.3 (2019 10:18)  HR: 92 (2019 20:34) (76 - 100)  BP: 108/72 (2019 20:34) (101/57 - 126/75)  BP(mean): --  RR: 16 (2019 20:34) (16 - 18)  SpO2: 98% (2019 20:34) (97% - 98%)  Height (cm): 175 (-18 @ 21:23)  Weight (kg): 93 (18 @ 21:23)  BMI (kg/m2): 30.4 (-18 @ 21:23)    Constitutional: wn/wd in NAD.   HEENT: NCAT, MMM, OP clear, EOMI, , no proptosis or lid retraction  Neck: no thyromegaly or palpable thyroid nodules   Respiratory: lungs CTAB.  Cardiovascular: regular rhythm, normal S1 and S2, no audible murmurs  GI: soft, NT/ND, no masses/HSM appreciated.  Neurology: no tremors, DTR 2+  Skin: no visible rashes/lesions, + significant hirsutism on chin and abdomen  Psychiatric: AAO x 3, normal affect/mood.  Ext: radial pulses intact, DP pulses intact, extremities warm, no cyanosis, clubbing or edema.       LABS:                        15.0   11.74 )-----------( 280      ( 2019 06:36 )             43.7         136  |  100  |  15  ----------------------------<  272<H>  3.8   |  21<L>  |  0.44<L>    Ca    9.3      2019 06:36  Mg     1.9         TPro  8.1  /  Alb  4.6  /  TBili  0.5  /  DBili  <0.2  /  AST  25  /  ALT  27  /  AlkPhos  57      PT/INR - ( 2019 17:02 )   PT: 13.0 sec;   INR: 1.15          PTT - ( 2019 15:02 )  PTT:51.7 sec    Hemoglobin A1C, Whole Blood: 10.6 ( @ 06:36)  Hemoglobin A1C, Whole Blood: 11.0 ( @ 17:02)    Thyroid Stimulating Hormone, Serum: 1.177 ( @ 06:36)  Thyroid Stimulating Hormone, Serum: 1.218 ( @ 17:02)    Cholesterol, Serum: 230 mg/dL (19 @ 17:02)  HDL Cholesterol, Serum: 45 mg/dL (19 @ 17:02)  Triglycerides, Serum: 294 mg/dL (19 @ 17:02)  Direct LDL: 126 mg/dL (19 @ 17:02)      CAPILLARY BLOOD GLUCOSE      POCT Blood Glucose.: 226 mg/dL (2019 21:36)  POCT Blood Glucose.: 208 mg/dL (2019 17:00)  POCT Blood Glucose.: 264 mg/dL (2019 14:05)  POCT Blood Glucose.: 265 mg/dL (2019 06:50)

## 2019-11-19 NOTE — PROGRESS NOTE ADULT - PROBLEM SELECTOR PLAN 1
initially presented to [place] [facility] 11/17/2019 with c/o cp X 1 day, r/i NSTEMI and was transferred to [facility] for Cardiac Catheterization 11/18/2019: 3 V CAD; mLAD 90 %; D1 80 %; OM2 99 %; RPL 99 %; Right Radial access. CTS Dr Aashish David consulted, Heparin gtt initiated, patient currently undergoing workup for CTS scheduled Thursday 11/21.  [ ] F/u Echocardiogram   - Currently undergoing preoperative workup as per CTS  - Continue Heparin gtt, Aspirin 81mg QD, and Atorvastatin 80mg QHS  - Dash diet

## 2019-11-19 NOTE — PROGRESS NOTE ADULT - PROBLEM SELECTOR PLAN 6
F: Oral intake, NPO after midnight Wednesday   E: Replete electrolytes as needed for K<4 and Mg<2  N: DASH Diet, NPO after midnight Wednesday     DVT PPX: on Heparin gtt  Dispo: on schedule for CTS Thursday 11/21    Case discussed with Dr. Barnes

## 2019-11-19 NOTE — CONSULT NOTE ADULT - ASSESSMENT
Assesment:  34y Female current smoker, with strong family history of CAD (mom MI at 40y, CABG 50y), PMHx of DMII, arthritis, GERD presented to [place] ER on 11/17/19 with severe chest pain for 1 day. She described the CP as a pressure radiating to b/l UE and head. She attempted to alleviate the pain with protonix with no relief. She does note a recent history of CP and MALONEY after climbing 3 flights of stairs over the past month. Denies PND, LE edema, orthopnea, palpitations, syncope. CP progressively worsened over the day until in the middle of the night it woke her up from sleep. Pt presented to [place] ED where she was r/i NSTEMI with peak troponin 5.6, EKG without ST changes. She was started on a heparin drip and loaded with aspirin/plavix and transferred to [facility] for cardiac cath given CCS class 4 symptoms. Cardiac cath (r radial) revealed severe 3V CAD. CT surgery Dr. David consulted for surgical work up.      Plan:  Problem 1: CAD  - Plan for OR Thursday Will need pre-surgical testing: CBC+diff, CMP,  Coags, HgA1c, TSH, T+S x2, Lipid panel, BNP, EKG, Bedside PFTs, Carotid US, CXR pa/lat, UA, TTE   - Please avoid lisinopril, ACE/ARB lead to mattie-operative hemodynamic instability  - Continue metoprolol, titrate up as tolerated. Continue aspirin/statin/BB  - Continue with heparin drip, monitor PTT and adjust PRN.   - Monitor for recurrent CP     Problem 2: DMII, uncontrolled  - HgA1c 11.0  - Endocrine, Dr. Sapp consulted, appreciate recommendations  - Finger sticks > 200 at this time     Problem 3: GERD  -  Continue protonix PRN     Problem 4: HLD  - C/w statin     I have reviewed clinical labs tests and reports, radiology tests and reports, as well as old patient medical records, and discussed with the refering physician.

## 2019-11-19 NOTE — CONSULT NOTE ADULT - SUBJECTIVE AND OBJECTIVE BOX
Surgeon: Dr. David     Requesting Physician: Dr. Barnes     HISTORY OF PRESENT ILLNESS (Need 4):  35yo F, current smoker, with strong family history of CAD (mom MI at 40y, CABG 50y), PMHx of DMII, arthritis, GERD presented to [place] ER on 19 with severe chest pain for 1 day. She described the CP as a pressure radiating to b/l UE and head. She attempted to alleviate the pain with protonix with no relief. She does note a recent history of CP and MALONEY after climbing 3 flights of stairs over the past month. Denies PND, LE edema, orthopnea, palpitations, syncope. CP progressively worsened over the day until in the middle of the night it woke her up from sleep. Pt presented to [place] ED where she was r/i NSTEMI with peak troponin 5.6, EKG without ST changes. She was started on a heparin drip and loaded with aspirin/plavix and transferred to [facility] for cardiac cath given CCS class 4 symptoms. Cardiac cath (r radial) revealed severe 3V CAD. CT surgery Dr. David consulted for surgical work up.          PAST MEDICAL & SURGICAL HISTORY:  H/O gastroesophageal reflux (GERD)  Type 2 diabetes mellitus: Diabetes  Encounter for Essure implantation  H/O hernia repair  S/P       MEDICATIONS  (STANDING):  aspirin enteric coated 81 milliGRAM(s) Oral daily  atorvastatin 80 milliGRAM(s) Oral at bedtime  chlorhexidine 4% Liquid 1 Application(s) Topical once  dextrose 5%. 1000 milliLiter(s) (50 mL/Hr) IV Continuous <Continuous>  dextrose 50% Injectable 12.5 Gram(s) IV Push once  dextrose 50% Injectable 25 Gram(s) IV Push once  dextrose 50% Injectable 25 Gram(s) IV Push once  heparin  Infusion.  Unit(s)/Hr (10 mL/Hr) IV Continuous <Continuous>  influenza   Vaccine 0.5 milliLiter(s) IntraMuscular once  insulin lispro (HumaLOG) corrective regimen sliding scale   SubCutaneous Before meals and at bedtime  lisinopril 2.5 milliGRAM(s) Oral daily  metoprolol tartrate 25 milliGRAM(s) Oral two times a day  pantoprazole    Tablet 40 milliGRAM(s) Oral before breakfast  sodium chloride 0.9%. 500 milliLiter(s) (75 mL/Hr) IV Continuous <Continuous>  sodium chloride 0.9%. 1000 milliLiter(s) (75 mL/Hr) IV Continuous <Continuous>    MEDICATIONS  (PRN):  dextrose 40% Gel 15 Gram(s) Oral once PRN Blood Glucose LESS THAN 70 milliGRAM(s)/deciliter  glucagon  Injectable 1 milliGRAM(s) IntraMuscular once PRN Glucose LESS THAN 70 milligrams/deciliter  heparin  Injectable 5600 Unit(s) IV Push every 6 hours PRN For aPTT less than 40      Allergies    No Known Allergies    Intolerances        SOCIAL HISTORY:  Smoker:  YES 0.5 PPD since high school, current   ETOH use:  no  Ilicit Drug use:  YES marijuana     FAMILY HISTORY:  Family history of CABG: mom (age 50s)  FH: myocardial infarction: mom: age 40s s/p PCI  FH: CAD (coronary artery disease): mom; grandmother      Review of Systems (Need 10):  CONSTITUTIONAL: Denies fevers / chills, sweats, fatigue, weight loss, weight gain                                       NEURO:  Denies parathesias, seizures, syncope, confusion                                                                                  EYES:  Denies blurry vision, discharge, pain, loss of vision                                                                                    ENMT:  Denies difficulty hearing, vertigo, dysphagia, epistaxis, recent dental work                                       CV:  Denies chest pain, palpitations, MALONEY, orthopnea                                                                                           RESPIRATORY:  Denies wWheezing, SOB, cough / sputum, hemoptysis                                                               GI:  Denies nausea, vomiting, diarrhea, constipation, melena                                                                          : Denies hematuria, dysuria, urgency, incontinence                                                                                          MUSKULOSKELETAL:  Denies arthritis, joint swelling, muscle weakness                                                             SKIN/BREAST:  Denies rash, itching, hair loss, masses                                                                                              PSYCH:  Denies depression, anxiety, suicidal ideation                                                                                                HEME/LYMPH:  Denies bruises easily, enlarged lymph nodes, tender lymph nodes                                          ENDOCRINE:  Denies cold intolerance, heat intolerance, polydipsia                                                                      Vital Signs Last 24 Hrs  T(C): 35.7 (2019 05:38), Max: 36.7 (2019 19:59)  T(F): 96.2 (2019 05:38), Max: 98.1 (2019 19:59)  HR: 89 (2019 06:19) (76 - 97)  BP: 126/75 (2019 06:19) (101/57 - 138/91)  BP(mean): 106 (2019 11:45) (106 - 106)  RR: 18 (2019 06:19) (16 - 18)  SpO2: 98% (2019 06:19) (96% - 99%)    Physical Exam (Need 8)  General: Lying comfortably in bed, NAD    Neurological: A&O x3, no focal deficits, strength 5/5 throughout    Neck: Supple    Chest: No pectus    Cardiovascular: RRR, normal s1 s2, no M/R/G    Respiratory: Non-labored breathing, chest expansion symmetric, CTA b/l, no W/R/R    Gastrointestinal: +BS x4 quadrant, soft, non-tender to palpation, non-distended    Extremities: WWP, no pitting edema, no calf tenderness or erythema    Vascular: 2+radial pulses b/l, 2+PT pulses b/l, no varicose veins appreciated                                                            LABS:                        15.0   11.74 )-----------( 280      ( 2019 06:36 )             43.7         136  |  100  |  15  ----------------------------<  272<H>  3.8   |  21<L>  |  0.44<L>    Ca    9.3      2019 06:36  Mg     1.9         TPro  8.1  /  Alb  4.6  /  TBili  0.5  /  DBili  <0.2  /  AST  25  /  ALT  27  /  AlkPhos  57  11-18    PT/INR - ( 2019 17:02 )   PT: 13.0 sec;   INR: 1.15          PTT - ( 2019 07:08 )  PTT:42.4 sec    CARDIAC MARKERS ( 2019 17:02 )  x     / 0.23 ng/mL / 158 U/L / x     / 6.8 ng/mL          RADIOLOGY & ADDITIONAL STUDIES:      Cardiac Cath: severe 3 V CAD

## 2019-11-19 NOTE — PROGRESS NOTE ADULT - SUBJECTIVE AND OBJECTIVE BOX
Interventional Cardiology PA Adult Progress Note    C.C.:     S:    ROS negative except per Subjective and HPI.    O:  	  Medications:  atorvastatin 80 milliGRAM(s) Oral at bedtime  dextrose 40% Gel 15 Gram(s) Oral once PRN  dextrose 50% Injectable 12.5 Gram(s) IV Push once  dextrose 50% Injectable 25 Gram(s) IV Push once  dextrose 50% Injectable 25 Gram(s) IV Push once  glucagon  Injectable 1 milliGRAM(s) IntraMuscular once PRN  insulin lispro (HumaLOG) corrective regimen sliding scale   SubCutaneous Before meals and at bedtime    aspirin enteric coated 81 milliGRAM(s) Oral daily  chlorhexidine 4% Liquid 1 Application(s) Topical once  heparin  Infusion.  Unit(s)/Hr IV Continuous <Continuous>  heparin  Injectable 5600 Unit(s) IV Push every 6 hours PRN  influenza   Vaccine 0.5 milliLiter(s) IntraMuscular once    metoprolol tartrate 25 milliGRAM(s) Oral two times a day          pantoprazole    Tablet 40 milliGRAM(s) Oral before breakfast      Vitals:  T(C): 36.6 (11-19-19 @ 14:18), Max: 36.8 (11-19-19 @ 10:18)  HR: 88 (11-19-19 @ 11:22) (76 - 97)  BP: 119/75 (11-19-19 @ 11:22) (101/57 - 128/81)  RR: 17 (11-19-19 @ 11:22) (17 - 18)  SpO2: 97% (11-19-19 @ 11:22) (96% - 98%)  Wt(kg): --  I&O's Summary    18 Nov 2019 07:01  -  19 Nov 2019 07:00  --------------------------------------------------------  IN: 150 mL / OUT: 0 mL / NET: 150 mL    19 Nov 2019 07:01  -  19 Nov 2019 14:32  --------------------------------------------------------  IN: 360 mL / OUT: 0 mL / NET: 360 mL      Telemetry:     Physical Exam:  Appearance: Normal  HEENT: Normal oral mucosa, EOMi, PERRL  Neck: Supple, no JVD  Cardiovascular: Normal S1 S2, no murmurs appreciated  Respiratory: Lungs clear to auscultation mild bibasilar crackles with no rales, rhonchi, wheezing  Gastrointestinal: Soft, non-tender, + BS  Skin: No rashes, ecchymoses, or cyanosis  Extremities: Normal range of motion, no clubbing, cyanosis, or edema  Vascular: Peripheral pulses: Carotid, Radial, Femoral, DP, PT 2+ bilaterally, no Carotid/Femoral bruits  Neurologic: Non-focal  Psychiatry: A&O x 3, mood and affect appropriate    Rose:  Central/PICC/Mid Line:                 Labs:	 	                                  15.0   11.74 )-----------( 280      ( 19 Nov 2019 06:36 )             43.7     11-19    136  |  100  |  15  ----------------------------<  272<H>  3.8   |  21<L>  |  0.44<L>    Ca    9.3      19 Nov 2019 06:36  Mg     1.9     11-19    TPro  8.1  /  Alb  4.6  /  TBili  0.5  /  DBili  <0.2  /  AST  25  /  ALT  27  /  AlkPhos  57  11-18        Hemoglobin A1C, Whole Blood: 10.6 % (11-19 @ 06:36)  Hemoglobin A1C, Whole Blood: 11.0 % (11-18 @ 17:02)    Thyroid Stimulating Hormone, Serum: 1.177 uIU/mL (11-19 @ 06:36)  Thyroid Stimulating Hormone, Serum: 1.218 uIU/mL (11-18 @ 17:02)    PT/INR - ( 18 Nov 2019 17:02 )   PT: 13.0 sec;   INR: 1.15          PTT - ( 19 Nov 2019 07:08 )  PTT:42.4 sec    Radiology: Interventional Cardiology PA Adult Progress Note    C.C.: chest pain     S: Patient reports feeling well today, stating "I've been feeling much better since I got to this hospital," denies chest pain and shortness of breath.   ROS negative except per Subjective and HPI.    O:	  Medications:  heparin  Infusion.  Unit(s)/Hr IV Continuous <Continuous>  aspirin enteric coated 81 milliGRAM(s) Oral daily  metoprolol tartrate 25 milliGRAM(s) Oral two times a day  atorvastatin 80 milliGRAM(s) Oral at bedtime  insulin lispro (HumaLOG) corrective regimen sliding scale   SubCutaneous Before meals and at bedtime  pantoprazole    Tablet 40 milliGRAM(s) Oral before breakfast    Vitals:  T(C): 36.6 (11-19-19 @ 14:18), Max: 36.8 (11-19-19 @ 10:18)  HR: 88 (11-19-19 @ 11:22) (76 - 97)  BP: 119/75 (11-19-19 @ 11:22) (101/57 - 128/81)  RR: 17 (11-19-19 @ 11:22) (17 - 18)  SpO2: 97% (11-19-19 @ 11:22) (96% - 98%)    I&O's Summary    18 Nov 2019 07:01  -  19 Nov 2019 07:00  --------------------------------------------------------  IN: 150 mL / OUT: 0 mL / NET: 150 mL    19 Nov 2019 07:01  -  19 Nov 2019 14:32  --------------------------------------------------------  IN: 360 mL / OUT: 0 mL / NET: 360 mL     Physical Exam:  Appearance: Normal  HEENT: Normal oral mucosa, EOMi, PERRL  Neck: Supple, no JVD  Cardiovascular: Normal S1 S2, no murmurs appreciated  Respiratory: Lungs clear to auscultation mild bibasilar crackles with no rales, rhonchi, wheezing  Gastrointestinal: Soft, non-tender, + BS  Skin: No rashes, ecchymoses, or cyanosis  Extremities: Normal range of motion, no clubbing, cyanosis, or edema  Vascular: Peripheral pulses: Carotid, Radial, Femoral, DP, PT 2+ bilaterally, no Carotid/Femoral bruits  Neurologic: Non-focal  Psychiatry: A&O x 3, mood and affect appropriate    Labs:	 	             15.0   11.74 )-----------( 280      ( 19 Nov 2019 06:36 )             43.7     136  |  100  |  15  ----------------------------<  272<H>  3.8   |  21<L>  |  0.44<L>    Ca    9.3      19 Nov 2019 06:36    Mg     1.9     11-19    TPro  8.1  /  Alb  4.6  /  TBili  0.5  /  DBili  <0.2  /  AST  25  /  ALT  27  /  AlkPhos  57  11-18    Hemoglobin A1C, Whole Blood: 10.6 % (11-19 @ 06:36)    Hemoglobin A1C, Whole Blood: 11.0 % (11-18 @ 17:02)    Thyroid Stimulating Hormone, Serum: 1.177 uIU/mL (11-19 @ 06:36)    Thyroid Stimulating Hormone, Serum: 1.218 uIU/mL (11-18 @ 17:02)    PT/INR - ( 18 Nov 2019 17:02 )   PT: 13.0 sec;   INR: 1.15       PTT - ( 19 Nov 2019 07:08 )  PTT:42.4 sec Interventional Cardiology PA Adult Progress Note    C.C.: chest pain     S: Patient reports feeling well today, stating "I've been feeling much better since I got to this hospital," denies chest pain and shortness of breath.   ROS negative except per Subjective and HPI.    O:	  Medications:  heparin  Infusion.  Unit(s)/Hr IV Continuous <Continuous>  aspirin enteric coated 81 milliGRAM(s) Oral daily  metoprolol tartrate 25 milliGRAM(s) Oral two times a day  atorvastatin 80 milliGRAM(s) Oral at bedtime  insulin lispro (HumaLOG) corrective regimen sliding scale   SubCutaneous Before meals and at bedtime  pantoprazole    Tablet 40 milliGRAM(s) Oral before breakfast    Vitals:  T(C): 36.6 (11-19-19 @ 14:18), Max: 36.8 (11-19-19 @ 10:18)  HR: 88 (11-19-19 @ 11:22) (76 - 97)  BP: 119/75 (11-19-19 @ 11:22) (101/57 - 128/81)  RR: 17 (11-19-19 @ 11:22) (17 - 18)  SpO2: 97% (11-19-19 @ 11:22) (96% - 98%)    I&O's Summary    18 Nov 2019 07:01  -  19 Nov 2019 07:00  --------------------------------------------------------  IN: 150 mL / OUT: 0 mL / NET: 150 mL    19 Nov 2019 07:01  -  19 Nov 2019 14:32  --------------------------------------------------------  IN: 360 mL / OUT: 0 mL / NET: 360 mL     Physical Exam:  Appearance: Normal  HEENT: Normal oral mucosa, EOMi, JERRY  Neck: Supple, no JVD  Cardiovascular: Normal S1 S2, no murmurs appreciated  Respiratory: Lungs clear to auscultation with no rales, rhonchi, wheezing  Gastrointestinal: Soft, non-tender, + BS  Skin: No rashes, ecchymoses, or cyanosis  Extremities: Right Radial site soft and nontender, normal range of motion, no clubbing, cyanosis, or edema  Vascular: Peripheral pulses: Carotid, Radial, Femoral, DP, PT 2+ bilaterally, no Carotid/Femoral bruits  Neurologic: Non-focal  Psychiatry: A&O x 3, mood and affect appropriate    Labs:	 	             15.0   11.74 )-----------( 280      ( 19 Nov 2019 06:36 )             43.7     136  |  100  |  15  ----------------------------<  272<H>  3.8   |  21<L>  |  0.44<L>    Ca    9.3      19 Nov 2019 06:36    Mg     1.9     11-19    TPro  8.1  /  Alb  4.6  /  TBili  0.5  /  DBili  <0.2  /  AST  25  /  ALT  27  /  AlkPhos  57  11-18    Hemoglobin A1C, Whole Blood: 10.6 % (11-19 @ 06:36)    Hemoglobin A1C, Whole Blood: 11.0 % (11-18 @ 17:02)    Thyroid Stimulating Hormone, Serum: 1.177 uIU/mL (11-19 @ 06:36)    Thyroid Stimulating Hormone, Serum: 1.218 uIU/mL (11-18 @ 17:02)    PT/INR - ( 18 Nov 2019 17:02 )   PT: 13.0 sec;   INR: 1.15       PTT - ( 19 Nov 2019 07:08 )  PTT:42.4 sec

## 2019-11-19 NOTE — CHART NOTE - NSCHARTNOTEFT_GEN_A_CORE
Second attempt to remove TR band successful. Radial pulse intact. Pressure dressing applied. Instructions given. Will continue to monitor.

## 2019-11-19 NOTE — PROGRESS NOTE ADULT - ASSESSMENT
strong FHX of CAD 35 y/o Female (BHCG negative), current Smoker and Marijuana user with a strong FHx of CAD, with a PMHx if HTN, HLD, and uncontrolled DM-II (HgA1c 11), Arthritis, and GERD who initially presented to [place] [facility] 11/17/2019 with c/o cp X 1 day, r/i NSTEMI and was transferred to [facility] for Cardiac Catheterization 11/18/2019: 3 V CAD; mLAD 90 %; D1 80 %; OM2 99 %; RPL 99 %; Right Radial access. CTS Dr Aashish David consulted, Heparin gtt initiated, patient currently undergoing workup for CTS scheduled Thursday 11/21.

## 2019-11-19 NOTE — PROGRESS NOTE ADULT - PROBLEM SELECTOR PLAN 2
Uncontrolled, HgA1C: 11, Dr. Sapp following  - Continue ISS, FS, Diabetic diet Uncontrolled, HgA1C: 11, Dr. Sapp following  - Initiated Lispo 8units TID with meals and Lantus 24units QHS as per Dr. Sapp recs  - Continue ISS, FS, Diabetic diet

## 2019-11-20 ENCOUNTER — TRANSCRIPTION ENCOUNTER (OUTPATIENT)
Age: 34
End: 2019-11-20

## 2019-11-20 LAB
ALBUMIN SERPL ELPH-MCNC: 4.4 G/DL — SIGNIFICANT CHANGE UP (ref 3.3–5)
ALP SERPL-CCNC: 54 U/L — SIGNIFICANT CHANGE UP (ref 40–120)
ALT FLD-CCNC: 22 U/L — SIGNIFICANT CHANGE UP (ref 10–45)
ANION GAP SERPL CALC-SCNC: 12 MMOL/L — SIGNIFICANT CHANGE UP (ref 5–17)
APTT BLD: 55 SEC — HIGH (ref 27.5–36.3)
APTT BLD: 55.6 SEC — HIGH (ref 27.5–36.3)
APTT BLD: 85.2 SEC — HIGH (ref 27.5–36.3)
AST SERPL-CCNC: 16 U/L — SIGNIFICANT CHANGE UP (ref 10–40)
BASOPHILS # BLD AUTO: 0.04 K/UL — SIGNIFICANT CHANGE UP (ref 0–0.2)
BASOPHILS NFR BLD AUTO: 0.4 % — SIGNIFICANT CHANGE UP (ref 0–2)
BILIRUB SERPL-MCNC: 0.4 MG/DL — SIGNIFICANT CHANGE UP (ref 0.2–1.2)
BLD GP AB SCN SERPL QL: NEGATIVE — SIGNIFICANT CHANGE UP
BUN SERPL-MCNC: 21 MG/DL — SIGNIFICANT CHANGE UP (ref 7–23)
CALCIUM SERPL-MCNC: 10 MG/DL — SIGNIFICANT CHANGE UP (ref 8.4–10.5)
CHLORIDE SERPL-SCNC: 101 MMOL/L — SIGNIFICANT CHANGE UP (ref 96–108)
CO2 SERPL-SCNC: 21 MMOL/L — LOW (ref 22–31)
CORTIS AM PEAK SERPL-MCNC: 12.8 UG/DL — SIGNIFICANT CHANGE UP (ref 3.9–37.5)
CREAT SERPL-MCNC: 0.49 MG/DL — LOW (ref 0.5–1.3)
DENV1 AB SER-ACNC: 207 UG/DL — SIGNIFICANT CHANGE UP (ref 45–270)
EOSINOPHIL # BLD AUTO: 0.23 K/UL — SIGNIFICANT CHANGE UP (ref 0–0.5)
EOSINOPHIL NFR BLD AUTO: 2 % — SIGNIFICANT CHANGE UP (ref 0–6)
FSH SERPL-MCNC: 1.8 IU/L — SIGNIFICANT CHANGE UP
GLUCOSE BLDC GLUCOMTR-MCNC: 115 MG/DL — HIGH (ref 70–99)
GLUCOSE BLDC GLUCOMTR-MCNC: 123 MG/DL — HIGH (ref 70–99)
GLUCOSE BLDC GLUCOMTR-MCNC: 170 MG/DL — HIGH (ref 70–99)
GLUCOSE BLDC GLUCOMTR-MCNC: 274 MG/DL — HIGH (ref 70–99)
GLUCOSE SERPL-MCNC: 282 MG/DL — HIGH (ref 70–99)
HCT VFR BLD CALC: 41.8 % — SIGNIFICANT CHANGE UP (ref 34.5–45)
HGB BLD-MCNC: 14.3 G/DL — SIGNIFICANT CHANGE UP (ref 11.5–15.5)
IMM GRANULOCYTES NFR BLD AUTO: 0.5 % — SIGNIFICANT CHANGE UP (ref 0–1.5)
INR BLD: 1.18 — HIGH (ref 0.88–1.16)
INSULIN-LIKE GROWTH FACTOR 1 INTERPRETATION: SIGNIFICANT CHANGE UP
INSULIN-LIKE GROWTH FACTOR 1: 215 NG/ML — SIGNIFICANT CHANGE UP (ref 83–280)
LH SERPL-ACNC: 3 IU/L — SIGNIFICANT CHANGE UP
LYMPHOCYTES # BLD AUTO: 27.4 % — SIGNIFICANT CHANGE UP (ref 13–44)
LYMPHOCYTES # BLD AUTO: 3.12 K/UL — SIGNIFICANT CHANGE UP (ref 1–3.3)
MAGNESIUM SERPL-MCNC: 1.8 MG/DL — SIGNIFICANT CHANGE UP (ref 1.6–2.6)
MCHC RBC-ENTMCNC: 32 PG — SIGNIFICANT CHANGE UP (ref 27–34)
MCHC RBC-ENTMCNC: 34.2 GM/DL — SIGNIFICANT CHANGE UP (ref 32–36)
MCV RBC AUTO: 93.5 FL — SIGNIFICANT CHANGE UP (ref 80–100)
MONOCYTES # BLD AUTO: 0.68 K/UL — SIGNIFICANT CHANGE UP (ref 0–0.9)
MONOCYTES NFR BLD AUTO: 6 % — SIGNIFICANT CHANGE UP (ref 2–14)
NEUTROPHILS # BLD AUTO: 7.25 K/UL — SIGNIFICANT CHANGE UP (ref 1.8–7.4)
NEUTROPHILS NFR BLD AUTO: 63.7 % — SIGNIFICANT CHANGE UP (ref 43–77)
NRBC # BLD: 0 /100 WBCS — SIGNIFICANT CHANGE UP (ref 0–0)
NT-PROBNP SERPL-SCNC: 130 PG/ML — SIGNIFICANT CHANGE UP (ref 0–300)
PLATELET # BLD AUTO: 279 K/UL — SIGNIFICANT CHANGE UP (ref 150–400)
POTASSIUM SERPL-MCNC: 4.2 MMOL/L — SIGNIFICANT CHANGE UP (ref 3.5–5.3)
POTASSIUM SERPL-SCNC: 4.2 MMOL/L — SIGNIFICANT CHANGE UP (ref 3.5–5.3)
PROT SERPL-MCNC: 7.7 G/DL — SIGNIFICANT CHANGE UP (ref 6–8.3)
PROTHROM AB SERPL-ACNC: 13.4 SEC — HIGH (ref 10–12.9)
RBC # BLD: 4.47 M/UL — SIGNIFICANT CHANGE UP (ref 3.8–5.2)
RBC # FLD: 11.5 % — SIGNIFICANT CHANGE UP (ref 10.3–14.5)
RH IG SCN BLD-IMP: POSITIVE — SIGNIFICANT CHANGE UP
SODIUM SERPL-SCNC: 134 MMOL/L — LOW (ref 135–145)
WBC # BLD: 11.38 K/UL — HIGH (ref 3.8–10.5)
WBC # FLD AUTO: 11.38 K/UL — HIGH (ref 3.8–10.5)

## 2019-11-20 PROCEDURE — 99233 SBSQ HOSP IP/OBS HIGH 50: CPT

## 2019-11-20 PROCEDURE — 93306 TTE W/DOPPLER COMPLETE: CPT | Mod: 26

## 2019-11-20 RX ORDER — HEPARIN SODIUM 5000 [USP'U]/ML
INJECTION INTRAVENOUS; SUBCUTANEOUS
Qty: 25000 | Refills: 0 | Status: DISCONTINUED | OUTPATIENT
Start: 2019-11-20 | End: 2019-11-21

## 2019-11-20 RX ORDER — HEPARIN SODIUM 5000 [USP'U]/ML
5600 INJECTION INTRAVENOUS; SUBCUTANEOUS EVERY 6 HOURS
Refills: 0 | Status: DISCONTINUED | OUTPATIENT
Start: 2019-11-20 | End: 2019-11-21

## 2019-11-20 RX ORDER — INSULIN LISPRO 100/ML
10 VIAL (ML) SUBCUTANEOUS
Refills: 0 | Status: DISCONTINUED | OUTPATIENT
Start: 2019-11-20 | End: 2019-11-21

## 2019-11-20 RX ORDER — SENNA PLUS 8.6 MG/1
2 TABLET ORAL AT BEDTIME
Refills: 0 | Status: DISCONTINUED | OUTPATIENT
Start: 2019-11-20 | End: 2019-11-21

## 2019-11-20 RX ORDER — PSYLLIUM SEED (WITH DEXTROSE)
1 POWDER (GRAM) ORAL DAILY
Refills: 0 | Status: DISCONTINUED | OUTPATIENT
Start: 2019-11-20 | End: 2019-11-21

## 2019-11-20 RX ORDER — CHLORHEXIDINE GLUCONATE 213 G/1000ML
20 SOLUTION TOPICAL ONCE
Refills: 0 | Status: COMPLETED | OUTPATIENT
Start: 2019-11-20 | End: 2019-11-21

## 2019-11-20 RX ORDER — INSULIN GLARGINE 100 [IU]/ML
30 INJECTION, SOLUTION SUBCUTANEOUS AT BEDTIME
Refills: 0 | Status: DISCONTINUED | OUTPATIENT
Start: 2019-11-20 | End: 2019-11-21

## 2019-11-20 RX ORDER — CHLORHEXIDINE GLUCONATE 213 G/1000ML
1 SOLUTION TOPICAL ONCE
Refills: 0 | Status: COMPLETED | OUTPATIENT
Start: 2019-11-20 | End: 2019-11-20

## 2019-11-20 RX ORDER — MAGNESIUM OXIDE 400 MG ORAL TABLET 241.3 MG
800 TABLET ORAL ONCE
Refills: 0 | Status: COMPLETED | OUTPATIENT
Start: 2019-11-20 | End: 2019-11-20

## 2019-11-20 RX ORDER — CHLORHEXIDINE GLUCONATE 213 G/1000ML
1 SOLUTION TOPICAL ONCE
Refills: 0 | Status: COMPLETED | OUTPATIENT
Start: 2019-11-21 | End: 2019-11-21

## 2019-11-20 RX ADMIN — Medication 25 MILLIGRAM(S): at 05:48

## 2019-11-20 RX ADMIN — HEPARIN SODIUM 1000 UNIT(S)/HR: 5000 INJECTION INTRAVENOUS; SUBCUTANEOUS at 06:23

## 2019-11-20 RX ADMIN — HEPARIN SODIUM 1000 UNIT(S)/HR: 5000 INJECTION INTRAVENOUS; SUBCUTANEOUS at 20:54

## 2019-11-20 RX ADMIN — PANTOPRAZOLE SODIUM 40 MILLIGRAM(S): 20 TABLET, DELAYED RELEASE ORAL at 06:24

## 2019-11-20 RX ADMIN — CHLORHEXIDINE GLUCONATE 1 APPLICATION(S): 213 SOLUTION TOPICAL at 18:00

## 2019-11-20 RX ADMIN — Medication 2: at 12:23

## 2019-11-20 RX ADMIN — Medication 8 UNIT(S): at 08:22

## 2019-11-20 RX ADMIN — Medication 81 MILLIGRAM(S): at 12:25

## 2019-11-20 RX ADMIN — SENNA PLUS 2 TABLET(S): 8.6 TABLET ORAL at 12:24

## 2019-11-20 RX ADMIN — Medication 6: at 08:27

## 2019-11-20 RX ADMIN — Medication 1 PACKET(S): at 12:24

## 2019-11-20 RX ADMIN — Medication 10 UNIT(S): at 17:45

## 2019-11-20 RX ADMIN — HEPARIN SODIUM 0 UNIT(S)/HR: 5000 INJECTION INTRAVENOUS; SUBCUTANEOUS at 05:49

## 2019-11-20 RX ADMIN — Medication 25 MILLIGRAM(S): at 17:46

## 2019-11-20 RX ADMIN — Medication 8 UNIT(S): at 12:25

## 2019-11-20 RX ADMIN — CHLORHEXIDINE GLUCONATE 1 APPLICATION(S): 213 SOLUTION TOPICAL at 21:36

## 2019-11-20 RX ADMIN — MAGNESIUM OXIDE 400 MG ORAL TABLET 800 MILLIGRAM(S): 241.3 TABLET ORAL at 08:31

## 2019-11-20 RX ADMIN — HEPARIN SODIUM 1000 UNIT(S)/HR: 5000 INJECTION INTRAVENOUS; SUBCUTANEOUS at 13:11

## 2019-11-20 RX ADMIN — ATORVASTATIN CALCIUM 80 MILLIGRAM(S): 80 TABLET, FILM COATED ORAL at 21:35

## 2019-11-20 NOTE — PROGRESS NOTE ADULT - SUBJECTIVE AND OBJECTIVE BOX
Planned Date of Surgery:                                                                                                                  Surgeon:    Procedure:    HPI:  34y Female    PAST MEDICAL & SURGICAL HISTORY:  H/O gastroesophageal reflux (GERD)  Type 2 diabetes mellitus: Diabetes  Encounter for Essure implantation  H/O hernia repair  S/P       No Known Allergies      MEDICATIONS  (STANDING):  aspirin enteric coated 81 milliGRAM(s) Oral daily  atorvastatin 80 milliGRAM(s) Oral at bedtime  bisacodyl 5 milliGRAM(s) Oral at bedtime  chlorhexidine 0.12% Liquid 20 milliLiter(s) Swish and Spit once  chlorhexidine 4% Liquid 1 Application(s) Topical once  chlorhexidine 4% Liquid 1 Application(s) Topical once  chlorhexidine 4% Liquid 1 Application(s) Topical once  dextrose 50% Injectable 12.5 Gram(s) IV Push once  dextrose 50% Injectable 25 Gram(s) IV Push once  dextrose 50% Injectable 25 Gram(s) IV Push once  heparin  Infusion.  Unit(s)/Hr (10 mL/Hr) IV Continuous <Continuous>  influenza   Vaccine 0.5 milliLiter(s) IntraMuscular once  insulin glargine Injectable (LANTUS) 30 Unit(s) SubCutaneous at bedtime  insulin lispro (HumaLOG) corrective regimen sliding scale   SubCutaneous Before meals and at bedtime  insulin lispro Injectable (HumaLOG) 10 Unit(s) SubCutaneous three times a day with meals  metoprolol tartrate 25 milliGRAM(s) Oral two times a day  pantoprazole    Tablet 40 milliGRAM(s) Oral before breakfast  psyllium Powder 1 Packet(s) Oral daily  senna 2 Tablet(s) Oral at bedtime    MEDICATIONS  (PRN):  dextrose 40% Gel 15 Gram(s) Oral once PRN Blood Glucose LESS THAN 70 milliGRAM(s)/deciliter  glucagon  Injectable 1 milliGRAM(s) IntraMuscular once PRN Glucose LESS THAN 70 milligrams/deciliter  heparin  Injectable 5600 Unit(s) IV Push every 6 hours PRN For aPTT less than 40      On Beta Blocker? yes    Labs:                        14.3   11.38 )-----------( 279      ( 2019 06:23 )             41.8     11-20    134<L>  |  101  |  21  ----------------------------<  282<H>  4.2   |  21<L>  |  0.49<L>    Ca    10.0      2019 06:23  Mg     1.8         TPro  7.7  /  Alb  4.4  /  TBili  0.4  /  DBili  x   /  AST  16  /  ALT  22  /  AlkPhos  54      PT/INR - ( 2019 06:23 )   PT: 13.4 sec;   INR: 1.18          PTT - ( 2019 12:42 )  PTT:55.6 sec    ABO Interpretation: A (19 @ 05:30)      CARDIAC MARKERS ( 2019 17:02 )  x     / 0.23 ng/mL / 158 U/L / x     / 6.8 ng/mL          Hgb A1C: 10.6 post op endocrinology consult     EKG:  < from: 12 Lead ECG (19 @ 07:39) >    Ventricular Rate 69 BPM    Atrial Rate 69 BPM    P-R Interval 174 ms    QRS Duration 100 ms    Q-T Interval 388 ms    QTC Calculation(Bezet) 415 ms    P Axis 55 degrees    R Axis -34 degrees    T Axis 7 degrees    Diagnosis Line Normal sinus rhythm  Possible Left atrial enlargement  Left Anterior Fasicular Block  Incomplete right bundle branch block  Possible inferior wall MI    CXR:  no effusion and no pneumothorax   CT Scans:  n/a  Cath Report:  mLAD: 90%; D1: 80%; OM2: 99%; RPL: 99%; Right Radial access.   Echo:  < from: Echocardiogram (19 @ 14:41) >     1. Left ventricular hypertrophy.   2. Normal left ventricular systolic function.   3. Normal right ventricular size and systolic function.   4. Mild mitral regurgitation.   5. No pericardial effusion.    PFT's:  complete and reviewed   Carotid Duplex:  < from: US Duplex Carotid Arteries Complete, Bilateral (19 @ 12:49) >  No hemodynamically significant carotid artery stenosis.        Consult in Chart?  yes  Consent in Chart? yes  Pre-op Orders Placed? yes  Blood Prodeucts Ordered? yes  NPO ordered? yes

## 2019-11-20 NOTE — PROGRESS NOTE ADULT - PROBLEM SELECTOR PLAN 1
initially presented to [place] ER 11/17/2019 with c/o cp X 1 day, r/i NSTEMI and was transferred to [facility] for Cardiac Catheterization 11/18/2019: 3 V CAD; mLAD: 90%; D1: 80%; OM2: 99%; RPL: 99%; Right Radial access. CTS Dr. David consulted, Heparin gtt initiated, patient currently undergoing workup for CTS scheduled tomorrow 11/21.  [ ] F/u Echocardiogram   - Currently undergoing preoperative workup as per CTS  - Continue Heparin gtt, Aspirin 81mg QD, and Atorvastatin 80mg QHS  - Dash diet initially presented to [place] ER 11/17/2019 with c/o chest pain x1 day, r/i NSTEMI and was transferred to [facility] for Cardiac Catheterization 11/18/2019: 3VCAD, mLAD: 90%, D1: 80%, OM2: 99%, RPL: 99%, Right Radial access. CTS Dr. David consulted, Heparin gtt initiated, patient currently undergoing workup for CTS scheduled tomorrow 11/21.  [ ] Discontinue Heparin gtt 11/20 at 3AM as per CTS  - Continue Aspirin 81mg QD and Atorvastatin 80mg QHS  - Dash diet    Echocardiogram 11/20/2019: EF: 55%, moderate symmetric LVH, trace valvular AR, mild mitral annular calcification, mild MR

## 2019-11-20 NOTE — PROGRESS NOTE ADULT - SUBJECTIVE AND OBJECTIVE BOX
Interventional Cardiology PA Adult Progress Note    C.C.: chest pain     S: Patient reports feeling well today, denies chest pain and shortness of breath, anticipating surgery tomorrow.   ROS negative except per Subjective and HPI.    O:	  Medications:  heparin  Infusion.  Unit(s)/Hr IV Continuous <Continuous>  aspirin enteric coated 81 milliGRAM(s) Oral daily  metoprolol tartrate 25 milliGRAM(s) Oral two times a day  atorvastatin 80 milliGRAM(s) Oral at bedtime  insulin glargine Injectable (LANTUS) 24 Unit(s) SubCutaneous at bedtime  insulin lispro Injectable (HumaLOG) 8 Unit(s) SubCutaneous three times a day with meals  insulin lispro (HumaLOG) corrective regimen sliding scale   SubCutaneous Before meals and at bedtime  pantoprazole    Tablet 40 milliGRAM(s) Oral before breakfast    Vitals:  Vital Signs Last 24 Hrs  T(C): 36.6 (20 Nov 2019 04:52), Max: 37.1 (19 Nov 2019 22:07)  T(F): 97.9 (20 Nov 2019 04:52), Max: 98.7 (19 Nov 2019 22:07)  HR: 94 (20 Nov 2019 05:38) (76 - 100)  BP: 122/79 (20 Nov 2019 05:38) (106/62 - 122/79)  RR: 16 (20 Nov 2019 05:38) (16 - 17)  SpO2: 98% (20 Nov 2019 05:38) (97% - 98%)    I&O's Summary  11-19-19 @ 07:01  -  11-20-19 @ 07:00  --------------------------------------------------------  IN: 694 mL / OUT: 0 mL / NET: 694 mL     Physical Exam:  Appearance: Normal  HEENT: Normal oral mucosa, EOMi, JERRY  Neck: Supple, no JVD  Cardiovascular: Normal S1 S2, no murmurs appreciated  Respiratory: Lungs clear to auscultation with no rales, rhonchi, wheezing  Gastrointestinal: Soft, non-tender, + BS  Skin: No rashes, ecchymoses, or cyanosis  Extremities: Right Radial site soft and nontender, normal range of motion, no clubbing, cyanosis, or edema  Vascular: Peripheral pulses: Carotid, Radial, Femoral, DP, PT 2+ bilaterally, no Carotid/Femoral bruits  Neurologic: Non-focal  Psychiatry: A&O x 3, mood and affect appropriate    Labs:	 	             14.3   11.38 )-----------( 279      ( 20 Nov 2019 06:23 )             41.8     PT/INR - ( 20 Nov 2019 06:23 )   PT: 13.4 sec;   INR: 1.18        PTT - ( 20 Nov 2019 06:23 )  PTT:55.0 sec Interventional Cardiology PA Adult Progress Note    C.C.: chest pain     S: Patient reports feeling well today, denies chest pain and shortness of breath, anticipating surgery tomorrow.   ROS negative except per Subjective and HPI.    O:	  Medications:  heparin  Infusion.  Unit(s)/Hr IV Continuous <Continuous>  aspirin enteric coated 81 milliGRAM(s) Oral daily  metoprolol tartrate 25 milliGRAM(s) Oral two times a day  atorvastatin 80 milliGRAM(s) Oral at bedtime  insulin glargine Injectable (LANTUS) 30 Unit(s) SubCutaneous at bedtime  insulin lispro Injectable (HumaLOG) 10 Unit(s) SubCutaneous three times a day with meals  insulin lispro (HumaLOG) corrective regimen sliding scale   SubCutaneous Before meals and at bedtime  pantoprazole    Tablet 40 milliGRAM(s) Oral before breakfast    Vitals:  Vital Signs Last 24 Hrs  T(C): 36.6 (20 Nov 2019 04:52), Max: 37.1 (19 Nov 2019 22:07)  T(F): 97.9 (20 Nov 2019 04:52), Max: 98.7 (19 Nov 2019 22:07)  HR: 94 (20 Nov 2019 05:38) (76 - 100)  BP: 122/79 (20 Nov 2019 05:38) (106/62 - 122/79)  RR: 16 (20 Nov 2019 05:38) (16 - 17)  SpO2: 98% (20 Nov 2019 05:38) (97% - 98%)    I&O's Summary  11-19-19 @ 07:01  -  11-20-19 @ 07:00  --------------------------------------------------------  IN: 694 mL / OUT: 0 mL / NET: 694 mL     Physical Exam:  Appearance: Normal  HEENT: Normal oral mucosa, EOMi, JERRY  Neck: Supple, no JVD  Cardiovascular: Normal S1 S2, no murmurs appreciated  Respiratory: Lungs clear to auscultation with no rales, rhonchi, wheezing  Gastrointestinal: Soft, non-tender, + BS  Skin: No rashes, ecchymoses, or cyanosis  Extremities: Right Radial site soft and nontender, normal range of motion, no clubbing, cyanosis, or edema  Vascular: Peripheral pulses: Carotid, Radial, Femoral, DP, PT 2+ bilaterally, no Carotid/Femoral bruits  Neurologic: Non-focal  Psychiatry: A&O x 3, mood and affect appropriate    Labs:	 	             14.3   11.38 )-----------( 279      ( 20 Nov 2019 06:23 )             41.8     PT/INR - ( 20 Nov 2019 06:23 )   PT: 13.4 sec;   INR: 1.18        PTT - ( 20 Nov 2019 06:23 )  PTT:55.0 sec Interventional Cardiology PA Adult Progress Note    C.C.: chest pain     S: Patient reports feeling well today, denies chest pain and shortness of breath, anticipating surgery tomorrow.   ROS negative except per Subjective and HPI.    O:	  Medications:  heparin  Infusion.  Unit(s)/Hr IV Continuous <Continuous>  aspirin enteric coated 81 milliGRAM(s) Oral daily  metoprolol tartrate 25 milliGRAM(s) Oral two times a day  atorvastatin 80 milliGRAM(s) Oral at bedtime  insulin glargine Injectable (LANTUS) 30 Unit(s) SubCutaneous at bedtime  insulin lispro Injectable (HumaLOG) 10 Unit(s) SubCutaneous three times a day with meals  insulin lispro (HumaLOG) corrective regimen sliding scale   SubCutaneous Before meals and at bedtime  pantoprazole    Tablet 40 milliGRAM(s) Oral before breakfast    Vitals:  Vital Signs Last 24 Hrs  T(C): 36.6 (20 Nov 2019 04:52), Max: 37.1 (19 Nov 2019 22:07)  T(F): 97.9 (20 Nov 2019 04:52), Max: 98.7 (19 Nov 2019 22:07)  HR: 94 (20 Nov 2019 05:38) (76 - 100)  BP: 122/79 (20 Nov 2019 05:38) (106/62 - 122/79)  RR: 16 (20 Nov 2019 05:38) (16 - 17)  SpO2: 98% (20 Nov 2019 05:38) (97% - 98%)    I&O's Summary  11-19-19 @ 07:01  -  11-20-19 @ 07:00  --------------------------------------------------------  IN: 694 mL / OUT: 0 mL / NET: 694 mL     Physical Exam:  Appearance: Normal  HEENT: Normal oral mucosa, EOMi, JERRY  Neck: Supple, no JVD  Cardiovascular: Normal S1 S2, no murmurs appreciated  Respiratory: Lungs clear to auscultation with no rales, rhonchi, wheezing  Gastrointestinal: Soft, non-tender, + BS  Skin: No rashes, ecchymoses, or cyanosis  Extremities: Right Radial site soft and nontender, normal range of motion, no clubbing, cyanosis, or edema  Vascular: Peripheral pulses: Carotid, Radial, Femoral, DP, PT 2+ bilaterally, no Carotid/Femoral bruits  Neurologic: Non-focal  Psychiatry: A&O x 3, mood and affect appropriate    Labs:	 	             14.3   11.38 )-----------( 279      ( 20 Nov 2019 06:23 )             41.8     134<L>  |  101  |  21  ----------------------------<  282<H>  4.2   |  21<L>  |  0.49<L>    Ca    10.0      20 Nov 2019 06:23    Mg     1.8     11-20    TPro  7.7  /  Alb  4.4  /  TBili  0.4  /  DBili  x   /  AST  16  /  ALT  22  /  AlkPhos  54  11-20    PT/INR - ( 20 Nov 2019 06:23 )   PT: 13.4 sec;   INR: 1.18       PTT - ( 20 Nov 2019 12:42 )  PTT:55.6 sec

## 2019-11-20 NOTE — PROGRESS NOTE ADULT - PROBLEM SELECTOR PLAN 3
- Continue home Metoprolol Tartrate 25mg PO BID  - Holding home Lisinopril 2.5mg PO QD perioperatively
- Continue home Metoprolol Tartrate 25mg PO BID  - Holding home Lisinopril 2.5mg PO QD perioperatively

## 2019-11-20 NOTE — DIETITIAN INITIAL EVALUATION ADULT. - OTHER INFO
33 y/o Female (BHCG negative), current Smoker and Marijuana user with a strong FHx of CAD, with a PMHx if HTN, HLD, and uncontrolled DM-II,  Arthritis, and GERD who initially presented to [place] ER 11/17/2019 with c/o cp X 1 day, r/i NSTEMI and was transferred to [facility] for Cardiac Catheterization 11/18/2019: 3 V CAD; mLAD 90 %; D1 80 %; OM2 99 %; RPL 99 %; Right Radial access. Patient currently undergoing workup for CTS scheduled Thursday 11/21. Pt resting in bed, denies pain, N/V. Small BM this AM. Pt reports good appetite now and PTA, consuming >75% of meals at this time. Pt reports consuming vegetables, egg whites, fruits and vegetables PTa, limits salt in diet. Pt denies food allergies. Weight stable at 195lb PTA per pt (weight documented as 205lb on 11/18). Pt reports BG levels at home are in the 200s for both fasting and post-prandial. Consider endocrinology consult. Discussed increased nutritional needs pre/post-op with pt. Reviewed DM, DASH diet as well. Pt appears receptive to recommendations/education. RD to monitor and f/u per protocol.

## 2019-11-20 NOTE — DISCHARGE NOTE PROVIDER - PROVIDER TOKENS
PROVIDER:[TOKEN:[[id number]]],PROVIDER:[TOKEN:[[id number]]],PROVIDER:[TOKEN:[[id number]]]

## 2019-11-20 NOTE — DISCHARGE NOTE PROVIDER - HOSPITAL COURSE
35 y/o Female, current Smoker + Marijuana user with strong FHX of CAD ( MI mom at age 40; CABG mom at age 50); PMH of  Type 2 DM, arthritis, GERD who initially presented to [place] ER 11/17/19 c/o CP X 1 day. Pt. reports that Saturday night; she had severe CP that woke her up from her sleep; pt. reports that the CP was mid-sternal; "pressure-burning" like sensation radiating to B/L UE associated with SOB; rates it as 10/10; not worse with inspiration or movement. She first thought that it might be 2/2 her GERD; took protonix and milk with no improvement in her sx which prompted her to go to the ED.  At baseline; pt. reports of having CP associated with SOB on climbing 2-3 flights of stairs which is worse with exertion and relieved with rest occuring for the last few months; She reports that her PCP was supposed to give her referral to see a cardiologist.  Pt denies dizziness, diaphoresis, fatigue, LE edema, palpitations, orthopnea, PND, syncope At [facility] ; pt ruled in NSTEMI and was admitted for further management of NSTEMI. Troponin I 0.2333> 3.370> 5.3> 8.73 >5.6; + THC in urine; Hg 13.6; negative urine test for pregnancy; EKG as per [place] paperwork NSR with no ST-T changes noted; At [place] pt was started on Hep gtt for NSTEMI; loaded with  mg PO X 1 and Plavix 300 mg PO X 1 on 11/17/19 and  started on ASA 81 ng daily; Plavix 75 mg daily; Lopressor 25 mg BID; Lisinopril 2.5 mg daily and Lipitor 80 mg daily.  Pt is now transferred to [facility] for cardiac cath  2/2 pt. risk factors, CCS class 4  sx and R/I NSTEMI.  On arrival to [facility]; Pt denies any CP, SOB, dizziness, diaphoresis, fatigue, LE edema, palpitations, orthopnea, PND, syncope; recent sick contacts/illness; URI or UTI sx. /91, HR 89, RR 16, T 97.2 O2 99 RA; EKG:  NSR @ 74 bpm; q wave inferior leads; LAD; no ST-T changes noted. Transfer meds: ASA 81 mg daily; Plavix 75 mg daily; Lipitor 80 mg daily; Lisinopril 2.5 mg daily and Lopressor 25 mg BID. Patient is now s/p Cardiac Catheterization 11/18/2019: 3VCAD; mLAD: 90 %, D1: 80 %, OM2: 99 %, RPL: 99 %, Right Radial access. This is a 34 year old female, current Smoker, + Marijuana user with strong a strong family history of CAD ( MI mom at age 40; CABG mom at age 50); past medical history significant for poorly controlled Type 2 DM, arthritis, GERD who initially presented to [place] ER 11/17/19 c/o CP X 1 day. Patient reported that Saturday night she had severe chest pain that woke her up from her sleep, which was mid-sternal "pressure-burning" like sensation, 10/10, radiating to bilateral upper extremity associated with SOB.  She first thought that it might be secondary to her GERD and took protonix and milk with no improvement in the symptoms which prompted her to go to the ED.  At baseline, patient reports having chest pain associated with SOB on climbing 2-3 flights of stairs worsening with exertion and relieved with rest for the last few months and was waiting for a referral to a cardiologist from her PCP.  Denies dizziness, diaphoresis, fatigue, LE edema, palpitations, orthopnea, PND, syncope at [facility].  She was ruled in for NSTEMI with an EKG as per [place] paperwork NSR with no ST-T changes notedand was admitted for further management;  Labs showed positive Troponin I, positive THC in urine; ; At [place] pt was started on Hep gtt for NSTEMI; loaded with  mg PO X 1 and Plavix 300 mg PO X 1 on 11/17/19 and  started on ASA 81 ng daily; Plavix 75 mg daily; Lopressor 25 mg BID; Lisinopril 2.5 mg daily and Lipitor 80 mg daily.  Pt is now transferred to [facility] for cardiac cath  2/2 pt. risk factors, CCS class 4  sx and R/I NSTEMI.  On arrival to [facility]; Pt denies any CP, SOB, dizziness, diaphoresis, fatigue, LE edema, palpitations, orthopnea, PND, syncope; recent sick contacts/illness; URI or UTI sx. /91, HR 89, RR 16, T 97.2 O2 99 RA; EKG:  NSR @ 74 bpm; q wave inferior leads; LAD; no ST-T changes noted. Transfer meds: ASA 81 mg daily; Plavix 75 mg daily; Lipitor 80 mg daily; Lisinopril 2.5 mg daily and Lopressor 25 mg BID. Patient is now s/p Cardiac Catheterization 11/18/2019: 3VCAD; mLAD: 90 %, D1: 80 %, OM2: 99 %, RPL: 99 %, Right Radial access. This is a 34 year old female, current Smoker, + Marijuana user with strong a strong family history of CAD ( MI mom at age 40; CABG mom at age 50); past medical history significant for poorly controlled Type 2 DM, arthritis, GERD who initially presented to [place] ER 11/17/19 c/o CP X 1 day. Patient reported that Saturday night she had severe chest pain that woke her up from her sleep, which was mid-sternal "pressure-burning" like sensation, 10/10, radiating to bilateral upper extremity associated with SOB.  She first thought that it might be secondary to her GERD and took protonix and milk with no improvement in the symptoms which prompted her to go to the ED.  At baseline, patient reports having chest pain associated with SOB on climbing 2-3 flights of stairs worsening with exertion and relieved with rest for the last few months and was waiting for a referral to a cardiologist from her PCP.  Denies dizziness, diaphoresis, fatigue, LE edema, palpitations, orthopnea, PND, syncope at [facility].   EKG as per [place] paperwork NSR with no ST-T changes; labs showed positive Troponin I, positive THC in urine; she was ruled in for NSTEMI and was admitted for further management.  A Heparin infusion was initiated and she was loaded with  mg PO/Plavix 300 mg PO on 11/17/19.  The following day,  patient was transferred to [facility] for a Cardiac Catheterization secondary to patient's risk factors with a CCS class 4  symptoms and NSTEMI which showed 3VCAD; mLAD: 90 %, D1: 80 %, OM2: 99 %, RPL: 99 %, via a Right Radial access. On 11/21/19, patient underwent an uncomplicated OPCAB x 3 LIMA-LAD, SHARMILA y-graft- OM, radial -ELISHA, EF60% and was extubated POD 0.    POD 1 , chest tubes were removed without complications.  Beta blocker was started to titrate the nicardipine infusion off on and later in the day an insulin drip was restarted for hyperglycemia POD 1.   The Insulin infusion was transitioned to long acting insulin with better blood glucose control as recommended by the Endocrinologist, Dr. Sapp POD 2.  And, on POD 3, patient was transferred to floor care.  POD 4 patient remained hemodynamically stable ready for discharge  home today This is a 34 year old female, current Smoker, + Marijuana user with strong a strong family history of CAD ( MI mom at age 40; CABG mom at age 50); past medical history significant for poorly controlled Type 2 DM, arthritis, GERD who initially presented to [place] ER 11/17/19 c/o CP X 1 day. Patient reported that Saturday night she had severe chest pain that woke her up from her sleep, which was mid-sternal "pressure-burning" like sensation, 10/10, radiating to bilateral upper extremity associated with SOB.  She first thought that it might be secondary to her GERD and took protonix and milk with no improvement in the symptoms which prompted her to go to the ED.  At baseline, patient reports having chest pain associated with SOB on climbing 2-3 flights of stairs worsening with exertion and relieved with rest for the last few months and was waiting for a referral to a cardiologist from her PCP.  Denies dizziness, diaphoresis, fatigue, LE edema, palpitations, orthopnea, PND, syncope at [facility].   EKG as per [place] paperwork NSR with no ST-T changes; labs showed positive Troponin I, positive THC in urine; she was ruled in for NSTEMI and was admitted for further management.  A Heparin infusion was initiated and she was loaded with  mg PO/Plavix 300 mg PO on 11/17/19.  The following day,  patient was transferred to [facility] for a Cardiac Catheterization secondary to patient's risk factors with a CCS class 4  symptoms and NSTEMI which showed 3VCAD; mLAD: 90 %, D1: 80 %, OM2: 99 %, RPL: 99 %, via a Right Radial access. On 11/21/19, patient underwent an uncomplicated OPCAB x 3 LIMA-LAD, SHARMILA y-graft- OM, radial -ELISHA, EF60% and was extubated POD 0.    POD 1 , chest tubes were removed without complications.  Beta blocker was started to titrate the nicardipine infusion off on and later in the day an insulin drip was restarted for hyperglycemia POD 1.   The Insulin infusion was transitioned to long acting insulin with better blood glucose control as recommended by the Endocrinologist, Dr. Sapp POD 2.  And, on POD 3, patient was transferred to floor care.  POD 4  mild dilutional hyponatremia secondary to hyperglycemia. Patient remains hemodynamically stable ready for discharge  home today This is a 34 year old female, current Smoker, + Marijuana user with strong a strong family history of CAD ( MI mom at age 40; CABG mom at age 50); past medical history significant for poorly controlled Type 2 DM, arthritis, GERD who initially presented to [facility] ER 11/17/19 c/o CP X 1 day. Patient reported that Saturday night she had severe chest pain that woke her up from her sleep, which was mid-sternal "pressure-burning" like sensation, 10/10, radiating to bilateral upper extremity associated with SOB.  She first thought that it might be secondary to her GERD and took protonix and milk with no improvement in the symptoms which prompted her to go to the ED.  At baseline, patient reports having chest pain associated with SOB on climbing 2-3 flights of stairs worsening with exertion and relieved with rest for the last few months and was waiting for a referral to a cardiologist from her PCP.  Denies dizziness, diaphoresis, fatigue, LE edema, palpitations, orthopnea, PND, syncope at [facility].   EKG as per [facility] paperwork NSR with no ST-T changes; labs showed positive Troponin I, positive THC in urine; she was ruled in for NSTEMI and was admitted for further management.  A Heparin infusion was initiated and she was loaded with  mg PO/Plavix 300 mg PO on 11/17/19.  The following day,  patient was transferred to [facility] for a Cardiac Catheterization secondary to patient's risk factors with CCS class 4  symptoms and NSTEMI which showed 3VCAD; mLAD: 90 %, D1: 80 %, OM2: 99 %, RPL: 99 %, via a Right Radial access. On 11/21/19, patient underwent an uncomplicated OPCAB x 3 LIMA-LAD, SHARMILA y-graft- OM, radial -ELISHA, EF60% and was extubated POD 0.    POD 1 , chest tubes were removed without complications.  Beta blocker was started to titrate the nicardipine infusion off on and later in the day an insulin drip was restarted for hyperglycemia POD 1.   The Insulin infusion was transitioned to long acting insulin with better blood glucose control as recommended by the Endocrinologist, Dr. Sapp POD 2.  And, on POD 3, patient was transferred to floor care.  POD 4  mild dilutional hyponatremia secondary to hyperglycemia. Patient remains hemodynamically stable ready for discharge  home today

## 2019-11-20 NOTE — DISCHARGE NOTE PROVIDER - NSDCHHNEEDSERVICE_GEN_ALL_CORE
Wound care and assessment/Other, specify.../Medication teaching and assessment/Observation and assessment/Teaching and training

## 2019-11-20 NOTE — DISCHARGE NOTE PROVIDER - NSDCFUADDAPPT_GEN_ALL_CORE_FT
-Please follow up with Dr. Hesham David on   The office is located at [facility], [facility], 4th floor. Call us with any questions #[phone].    -Please follow up with Dr. Kurt Lucero (referring MD) on ___. The office is located at [address] [place] [address]. Call  with any questions.    -Please follow up with Dr. Vicki Sapp on ___.  The office is located at [address] [place] [address], [address] [place] [address]. Call  with any questions. -Please follow up with Dr. Hesham David on12/3/19 at 11:30am.  The office is located at [facility], [facility], 4th floor. Call us with any questions #[phone].    -Please follow up with Dr. Kurt Lucero (referring MD) on 12.13.19 at 10am. The office is located at [address] [place] [address]. Call  with any questions.    -Please follow up with Dr. Vicki Sapp on ___.  The office is located at [address] [place] [address], [address] [place] [address]. Call  with any questions. -Please follow up with Dr. Hesham David on12/3/19 at 11:30am.  The office is located at [facility], [facility], 4th floor. Call us with any questions #[phone].    -Please follow up with Dr. Kurt Lucero (referring MD) on 12.13.19 at 10am. The office is located at [address] [place] [address]. Call  with any questions.    -Please follow up with Dr. Vicki Sapp in 2 weeks.  The office is located at [address] [place] [address], [address] [place] [address]. Call  for an appointment and let them know Dr. Sapp agreed to see you.

## 2019-11-20 NOTE — DISCHARGE NOTE PROVIDER - CARE PROVIDERS DIRECT ADDRESSES
[email or web address],DirectAddress_Unknown

## 2019-11-20 NOTE — DISCHARGE NOTE PROVIDER - CARE PROVIDER_API CALL
Hesham David (MD)  Cardiovascular Surgery  [address], [address]th [place] [address]  Phone: [phone]  Fax: [phone]  Follow Up Time:     Kurt Lucero)  Cardiology; Internal Medicine  [address] [place] [address]  Phone: [phone]  Fax: [phone]  Follow Up Time:     Vicki Sapp; PhD)  Internal Medicine  [address], [address]  [place] [address]  Phone: [phone]  Fax: [phone]  Follow Up Time:

## 2019-11-20 NOTE — PROGRESS NOTE ADULT - PROBLEM SELECTOR PLAN 6
F: Oral intake, NPO after midnight  E: Replete electrolytes as needed for K<4 and Mg<2  N: DASH Diet, NPO after midnight    DVT PPX: on Heparin gtt  Dispo: on schedule for CTS tomorrow, 11/21  For referral to Cardiac rehabilitation upon discharge for cardiac conditioning    Case discussed with Dr. Barnes F: Oral intake, NPO after midnight  E: Replete electrolytes as needed for K<4 and Mg<2  N: DASH Diet, NPO after midnight    DVT PPX: on Heparin gtt, to be discontinued at 11/21 at 3AM as per CTS  Dispo: on schedule for CTS tomorrow, 11/21  For referral to Cardiac rehabilitation upon discharge for cardiac conditioning    Case discussed with Dr. Barnes

## 2019-11-20 NOTE — PROGRESS NOTE ADULT - PROBLEM SELECTOR PLAN 4
active smoker  - Continue Smoking cessation counseling active smoker  - Counseled on smoking cessation, patient states she has no desire to smoke after receiving CAD diagnosis

## 2019-11-20 NOTE — DIETITIAN INITIAL EVALUATION ADULT. - ENERGY NEEDS
Ht (11/20 per pt): 175.26cm, Wt (11/18): 93kg, IBW: 65.9kg+/-10%, %IBW: 141%, BMI: 30.4   IBW used to calculate energy needs due to pt's current body weight exceeding 120% of IBW. Needs adjusted pre-op, overweight status. Fluid needs per team 2/2 hyponatremia.

## 2019-11-20 NOTE — PROGRESS NOTE ADULT - ASSESSMENT
35 y/o Female (BHCG negative), current Smoker and Marijuana user with a strong FHx of CAD, with a PMHx if HTN, HLD, and uncontrolled DM-II (HgA1c 11), Arthritis, and GERD who initially presented to [place] [facility] 11/17/2019 with c/o cp X 1 day, r/i NSTEMI and was transferred to [facility] for Cardiac Catheterization 11/18/2019: 3 V CAD; mLAD 90 %; D1 80 %; OM2 99 %; RPL 99 %; Right Radial access. CTS Dr Aashish David consulted, Heparin gtt initiated, patient currently undergoing workup for CTS scheduled Thursday 11/21. 33 y/o Female (BHCG negative), current Smoker and Marijuana user with a strong FHx of CAD, and a PMHx if HTN, HLD, and uncontrolled DM-II (HgA1C: 11), Arthritis, and GERD who initially presented to [place] [facility] 11/17/2019 with c/o chest pain x1 day, r/i NSTEMI and was transferred to [facility] for Cardiac Catheterization 11/18/2019: 3VCAD, mLAD: 90%, D1: 80%, OM2: 99%, RPL: 99%, now NPO after midnight for CTS scheduled tomorrow morning 11/21.

## 2019-11-20 NOTE — DISCHARGE NOTE PROVIDER - NSDCMRMEDTOKEN_GEN_ALL_CORE_FT
glipiZIDE 10 mg oral tablet: 1 tab(s) orally 2 times a day  metFORMIN 1000 mg oral tablet: 1 tab(s) orally 2 times a day  Protonix 40 mg oral delayed release tablet: 1 tab(s) orally once a day acetaminophen 500 mg oral tablet: 1 tab(s) orally every 6 hours, As Needed   alcohol swabs : Apply topically to affected area 4 times a day   aspirin 81 mg oral delayed release tablet: 1 tab(s) orally once a day  atorvastatin 40 mg oral tablet: 1 tab(s) orally once a day (at bedtime)  clopidogrel 75 mg oral tablet: 1 tab(s) orally once a day  glipiZIDE 10 mg oral tablet: 1 tab(s) orally 2 times a day  glucometer (per patient&#x27;s insurance): Test blood sugars four times a day. Dispense #1 glucometer.  lancets: 1 application subcutaneously 4 times a day   metFORMIN 1000 mg oral tablet: 1 tab(s) orally 2 times a day  metoprolol tartrate 25 mg oral tablet: 1 tab(s) orally 2 times a day   oxycodone-acetaminophen 5 mg-325 mg oral tablet: 1 tab(s) orally every 6 hours, As needed, Moderate Pain (4 - 6) MDD:4  Protonix 40 mg oral delayed release tablet: 1 tab(s) orally once a day  senna oral tablet: 1 tab(s) orally once a day (at bedtime)   test strips (per patient&#x27;s insurance): 1 application subcutaneously 4 times a day  . ** Compatible with patient&#x27;s glucometer ** acetaminophen 500 mg oral tablet: 1 tab(s) orally every 6 hours, As Needed   alcohol swabs : Apply topically to affected area 4 times a day   aspirin 81 mg oral delayed release tablet: 1 tab(s) orally once a day  atorvastatin 40 mg oral tablet: 1 tab(s) orally once a day (at bedtime)  Basaglar KwikPen 100 units/mL subcutaneous solution: 36 unit(s) subcutaneous once a day (at bedtime)   clopidogrel 75 mg oral tablet: 1 tab(s) orally once a day  glucometer (per patient&#x27;s insurance): Test blood sugars four times a day. Dispense #1 glucometer.  HumaLOG KwikPen 100 units/mL injectable solution: 12 unit(s) subcutaneous 3 times a day (with meals)   Insulin Pen Needles, 4mm: 1 application subcutaneously 4 times a day. ** Use with insulin pen **   Jardiance 10 mg oral tablet: 1 tab(s) orally once a day (in the morning)   lancets: 1 application subcutaneously 4 times a day   metFORMIN 1000 mg oral tablet: 1 tab(s) orally 2 times a day  metoprolol tartrate 25 mg oral tablet: 1 tab(s) orally 2 times a day   oxycodone-acetaminophen 5 mg-325 mg oral tablet: 1 tab(s) orally every 6 hours, As needed, Moderate Pain (4 - 6) MDD:4  Protonix 40 mg oral delayed release tablet: 1 tab(s) orally once a day  senna oral tablet: 1 tab(s) orally once a day (at bedtime)   test strips (per patient&#x27;s insurance): 1 application subcutaneously 4 times a day  . ** Compatible with patient&#x27;s glucometer ** acetaminophen 500 mg oral tablet: 1 tab(s) orally every 6 hours, As Needed   alcohol swabs : Apply topically to affected area 4 times a day   aspirin 81 mg oral delayed release tablet: 1 tab(s) orally once a day  atorvastatin 40 mg oral tablet: 1 tab(s) orally once a day (at bedtime)  Basaglar KwikPen 100 units/mL subcutaneous solution: 36 unit(s) subcutaneous once a day (at bedtime)   clopidogrel 75 mg oral tablet: 1 tab(s) orally once a day  glucometer (per patient&#x27;s insurance): 1 application subcutaneous 4 times a day   HumaLOG KwikPen 100 units/mL injectable solution: 12 unit(s) subcutaneous 3 times a day (with meals)   Insulin Pen Needles, 4mm: 1 application subcutaneously 4 times a day . ** Use with insulin pen **   Jardiance 10 mg oral tablet: 1 tab(s) orally once a day (in the morning)   lancets: 1 application subcutaneously 4 times a day   metFORMIN 1000 mg oral tablet: 1 tab(s) orally 2 times a day  metoprolol tartrate 25 mg oral tablet: 1 tab(s) orally 2 times a day   oxycodone-acetaminophen 5 mg-325 mg oral tablet: 1 tab(s) orally every 6 hours, As needed, Moderate Pain (4 - 6) MDD:4  Protonix 40 mg oral delayed release tablet: 1 tab(s) orally once a day  senna oral tablet: 1 tab(s) orally once a day (at bedtime)   test strips (per patient&#x27;s insurance): 1 application subcutaneously 4 times a day  . ** Compatible with patient&#x27;s glucometer ** acetaminophen 500 mg oral tablet: 1 tab(s) orally every 6 hours, As Needed   alcohol swabs : Apply topically to affected area 4 times a day   aspirin 81 mg oral delayed release tablet: 1 tab(s) orally once a day  atorvastatin 40 mg oral tablet: 1 tab(s) orally once a day (at bedtime)  Basaglar KwikPen 100 units/mL subcutaneous solution: 36 unit(s) subcutaneous once a day (at bedtime)   clopidogrel 75 mg oral tablet: 1 tab(s) orally once a day  glucometer (per patient&#x27;s insurance): 1 application subcutaneous 4 times a day   HumaLOG KwikPen 100 units/mL injectable solution: 12 unit(s) subcutaneous 3 times a day (with meals)   Insulin Pen Needles, 4mm: 1 application subcutaneously 4 times a day  . ** Use with insulin pen **   Jardiance 10 mg oral tablet: 1 tab(s) orally once a day (in the morning)   lancets: 1 application subcutaneously 4 times a day   metFORMIN 1000 mg oral tablet: 1 tab(s) orally 2 times a day  metoprolol tartrate 25 mg oral tablet: 1 tab(s) orally 2 times a day   oxycodone-acetaminophen 5 mg-325 mg oral tablet: 1 tab(s) orally every 6 hours, As needed, Moderate Pain (4 - 6) MDD:4  Protonix 40 mg oral delayed release tablet: 1 tab(s) orally once a day  senna oral tablet: 1 tab(s) orally once a day (at bedtime)   test strips (per patient&#x27;s insurance): 1 application subcutaneously 4 times a day  . ** Compatible with patient&#x27;s glucometer **

## 2019-11-20 NOTE — PROGRESS NOTE ADULT - SUBJECTIVE AND OBJECTIVE BOX
INTERVAL HPI/OVERNIGHT EVENTS:    Patient is a 34y old  Female who presents with a chief complaint of NSTEMI (20 Nov 2019 16:54)  No acute overnight events  pt eating well  insulin administration reviewed  planned for CABG tomorrow, will be NPO after midnight.     Pt reports the following symptoms:    CONSTITUTIONAL:  Negative fever or chills, feels well, good appetite  EYES:  Negative  blurry vision or double vision  CARDIOVASCULAR:  Negative for chest pain or palpitations  RESPIRATORY:  Negative for cough, wheezing, or SOB   GASTROINTESTINAL:  Negative for nausea, vomiting, diarrhea, constipation, or abdominal pain  GENITOURINARY:  Negative frequency, urgency or dysuria  NEUROLOGIC:  No headache, confusion, dizziness, lightheadedness    MEDICATIONS  (STANDING):  aspirin enteric coated 81 milliGRAM(s) Oral daily  atorvastatin 80 milliGRAM(s) Oral at bedtime  bisacodyl 5 milliGRAM(s) Oral at bedtime  chlorhexidine 0.12% Liquid 20 milliLiter(s) Swish and Spit once  dextrose 50% Injectable 12.5 Gram(s) IV Push once  dextrose 50% Injectable 25 Gram(s) IV Push once  dextrose 50% Injectable 25 Gram(s) IV Push once  heparin  Infusion.  Unit(s)/Hr (10 mL/Hr) IV Continuous <Continuous>  influenza   Vaccine 0.5 milliLiter(s) IntraMuscular once  insulin glargine Injectable (LANTUS) 30 Unit(s) SubCutaneous at bedtime  insulin lispro (HumaLOG) corrective regimen sliding scale   SubCutaneous Before meals and at bedtime  insulin lispro Injectable (HumaLOG) 10 Unit(s) SubCutaneous three times a day with meals  metoprolol tartrate 25 milliGRAM(s) Oral two times a day  pantoprazole    Tablet 40 milliGRAM(s) Oral before breakfast  psyllium Powder 1 Packet(s) Oral daily  senna 2 Tablet(s) Oral at bedtime    MEDICATIONS  (PRN):  dextrose 40% Gel 15 Gram(s) Oral once PRN Blood Glucose LESS THAN 70 milliGRAM(s)/deciliter  glucagon  Injectable 1 milliGRAM(s) IntraMuscular once PRN Glucose LESS THAN 70 milligrams/deciliter  heparin  Injectable 5600 Unit(s) IV Push every 6 hours PRN For aPTT less than 40      Past medical history reviewed  Family history reviewed  Social history reviewed    PHYSICAL EXAM  Vital Signs Last 24 Hrs  T(C): 36.3 (20 Nov 2019 21:59), Max: 36.6 (20 Nov 2019 04:52)  T(F): 97.3 (20 Nov 2019 21:59), Max: 97.9 (20 Nov 2019 04:52)  HR: 74 (20 Nov 2019 20:40) (70 - 95)  BP: 115/69 (20 Nov 2019 20:40) (106/62 - 132/83)  BP(mean): --  RR: 16 (20 Nov 2019 20:40) (16 - 17)  SpO2: 97% (20 Nov 2019 20:40) (97% - 99%)    Constitutional: wn/wd in NAD.   HEENT: NCAT, MMM, OP clear, EOMI, no proptosis or lid retraction  Neck: no thyromegaly or palpable thyroid nodules   Respiratory: lungs CTAB.  Cardiovascular: regular rhythm, normal S1 and S2, no audible murmurs, no peripheral edema  GI: soft, NT/ND, no masses/HSM appreciated.  Neurology: no tremors, DTR 2+  Skin: no visible rashes/lesions  Psychiatric: AAO x 3, normal affect/mood.    LABS:                        14.3   11.38 )-----------( 279      ( 20 Nov 2019 06:23 )             41.8     11-20    134<L>  |  101  |  21  ----------------------------<  282<H>  4.2   |  21<L>  |  0.49<L>    Ca    10.0      20 Nov 2019 06:23  Mg     1.8     11-20    TPro  7.7  /  Alb  4.4  /  TBili  0.4  /  DBili  x   /  AST  16  /  ALT  22  /  AlkPhos  54  11-20    PT/INR - ( 20 Nov 2019 06:23 )   PT: 13.4 sec;   INR: 1.18          PTT - ( 20 Nov 2019 12:42 )  PTT:55.6 sec    Thyroid Stimulating Hormone, Serum: 1.177 uIU/mL (11-19 @ 06:36)  Thyroid Stimulating Hormone, Serum: 1.218 uIU/mL (11-18 @ 17:02)      HbA1C: 10.6 % (11-19 @ 06:36)  11.0 % (11-18 @ 17:02)    CAPILLARY BLOOD GLUCOSE      POCT Blood Glucose.: 123 mg/dL (20 Nov 2019 21:57)  POCT Blood Glucose.: 115 mg/dL (20 Nov 2019 16:50)  POCT Blood Glucose.: 170 mg/dL (20 Nov 2019 12:16)  POCT Blood Glucose.: 274 mg/dL (20 Nov 2019 06:49)      Direct LDL: 126 mg/dL (11-18-19 @ 17:02)    Cortisol AM, Serum: 12.8 ug/dL (11.20.19 @ 06:23)    Insulin-Like Growth Factor 1: 215 ng/mL (11.20.19 @ 11:24)    Follicle Stimulating Hormone, Serum: 1.8: FSH (IU/L)  Follicular:                      3.5-12.5  Ovulation:                     4.7-21.5  Luteal:                           1.7-7.7  Postmenopause:         25.8-134.8  Children:                       0.2-3.8  Male:                             1.5-12.4 IU/L (11.20.19 @ 11:21)    Luteinizing Hormone, Serum: 3.0: LH (IU/L)  Follicular:     2.4-12.6  Ovulation:    14.0-95.6  Luteal:        1.0-11.4  Postmenopause: 7.7-58.5  Children:      0.2-1.4  Male:          1.7-8.6 IU/L (11.20.19 @ 11:21)    Dehydroepiandrosterone-Sulfate, Serum: 207.0: Male                            Female  Antony Stage I   7-209 ug/dL   7-126 ug/dL  Antony Stage II   ug/dL  ug/dL  Antony Stage III   ug/dL  ug/dL  Antony Stage IV&V                      ug/dL  ug/dL ug/dL (11.20.19 @ 11:21)

## 2019-11-20 NOTE — DISCHARGE NOTE PROVIDER - NSDCFUSCHEDAPPT_GEN_ALL_CORE_FT
AKIN SCHULZ ; 11/21/2019 ; NPP Cts[facility] [address] AKIN SCHULZ ; 12/03/2019 ; NPP CT Surg [address]

## 2019-11-20 NOTE — DISCHARGE NOTE PROVIDER - NSDCFUADDINST_GEN_ALL_CORE_FT
-Walk daily as tolerated and use your incentive spirometer every hour.    -No driving or strenuous activity/exercise for 6 weeks, or until cleared by your surgeon.  Avoid lifting >10 lbs.    -Gently clean your incision(s) with anti-bacterial soap and water, pat dry and leave open to air.  We recommend liquid Dial.      -Call your doctor if you have shortness of breath, chest pain not relieved by pain medication, dizziness, fever >101.5, or increased redness or drainage from incisions. -Please keep your finger stick goal 100-180    -Walk daily as tolerated and use your incentive spirometer every hour.    -No driving or strenuous activity/exercise for 6 weeks, or until cleared by your surgeon.  Avoid lifting >10 lbs.    -Gently clean your incision(s) with anti-bacterial soap and water, pat dry and leave open to air.  We recommend liquid Dial.      -Call your doctor if you have shortness of breath, chest pain not relieved by pain medication, dizziness, fever >101.5, or increased redness or drainage from incisions.

## 2019-11-20 NOTE — PROGRESS NOTE ADULT - PROBLEM SELECTOR PLAN 2
Uncontrolled, HgA1C: 11, Dr. Sapp following  [ ] F/u Dr. Sapp recs  - Initiated Lispo 8units TID with meals and Lantus 24units QHS 11/19 as per Dr. Sapp  - Continue ISS, FS, Diabetic diet Uncontrolled, HgA1C: 11, Dr. Sapp following  [ ] F/u Dr. Sapp recs  - Insulin increased to Lispo 10units TID with meals and Lantus 30units QHS as per Dr. Sapp  - As per Dr. Sapp absolutely NO mattie/intra-operative steroids  - Continue ISS, FS, Diabetic diet

## 2019-11-21 ENCOUNTER — APPOINTMENT (OUTPATIENT)
Dept: CARDIOTHORACIC SURGERY | Facility: HOSPITAL | Age: 34
End: 2019-11-21
Payer: MEDICAID

## 2019-11-21 LAB
ALBUMIN SERPL ELPH-MCNC: 3.7 G/DL — SIGNIFICANT CHANGE UP (ref 3.3–5)
ALBUMIN SERPL ELPH-MCNC: 4.4 G/DL — SIGNIFICANT CHANGE UP (ref 3.3–5)
ALBUMIN SERPL ELPH-MCNC: 4.6 G/DL — SIGNIFICANT CHANGE UP (ref 3.3–5)
ALP SERPL-CCNC: 43 U/L — SIGNIFICANT CHANGE UP (ref 40–120)
ALP SERPL-CCNC: 52 U/L — SIGNIFICANT CHANGE UP (ref 40–120)
ALP SERPL-CCNC: 61 U/L — SIGNIFICANT CHANGE UP (ref 40–120)
ALT FLD-CCNC: 14 U/L — SIGNIFICANT CHANGE UP (ref 10–45)
ALT FLD-CCNC: 17 U/L — SIGNIFICANT CHANGE UP (ref 10–45)
ALT FLD-CCNC: 20 U/L — SIGNIFICANT CHANGE UP (ref 10–45)
ANION GAP SERPL CALC-SCNC: 12 MMOL/L — SIGNIFICANT CHANGE UP (ref 5–17)
ANION GAP SERPL CALC-SCNC: 12 MMOL/L — SIGNIFICANT CHANGE UP (ref 5–17)
ANION GAP SERPL CALC-SCNC: 15 MMOL/L — SIGNIFICANT CHANGE UP (ref 5–17)
APPEARANCE UR: CLEAR — SIGNIFICANT CHANGE UP
APTT BLD: 25.3 SEC — LOW (ref 27.5–36.3)
APTT BLD: 30.5 SEC — SIGNIFICANT CHANGE UP (ref 27.5–36.3)
APTT BLD: 32.2 SEC — SIGNIFICANT CHANGE UP (ref 27.5–36.3)
AST SERPL-CCNC: 15 U/L — SIGNIFICANT CHANGE UP (ref 10–40)
AST SERPL-CCNC: 16 U/L — SIGNIFICANT CHANGE UP (ref 10–40)
AST SERPL-CCNC: 24 U/L — SIGNIFICANT CHANGE UP (ref 10–40)
BASOPHILS # BLD AUTO: 0.24 K/UL — HIGH (ref 0–0.2)
BASOPHILS NFR BLD AUTO: 0.9 % — SIGNIFICANT CHANGE UP (ref 0–2)
BILIRUB SERPL-MCNC: 0.4 MG/DL — SIGNIFICANT CHANGE UP (ref 0.2–1.2)
BILIRUB SERPL-MCNC: 0.4 MG/DL — SIGNIFICANT CHANGE UP (ref 0.2–1.2)
BILIRUB SERPL-MCNC: 0.6 MG/DL — SIGNIFICANT CHANGE UP (ref 0.2–1.2)
BILIRUB UR-MCNC: NEGATIVE — SIGNIFICANT CHANGE UP
BLD GP AB SCN SERPL QL: NEGATIVE — SIGNIFICANT CHANGE UP
BUN SERPL-MCNC: 11 MG/DL — SIGNIFICANT CHANGE UP (ref 7–23)
BUN SERPL-MCNC: 17 MG/DL — SIGNIFICANT CHANGE UP (ref 7–23)
BUN SERPL-MCNC: 22 MG/DL — SIGNIFICANT CHANGE UP (ref 7–23)
C PEPTIDE SERPL-MCNC: 2.6 NG/ML — SIGNIFICANT CHANGE UP (ref 1.1–4.4)
CALCIUM SERPL-MCNC: 10.4 MG/DL — SIGNIFICANT CHANGE UP (ref 8.4–10.5)
CALCIUM SERPL-MCNC: 8.5 MG/DL — SIGNIFICANT CHANGE UP (ref 8.4–10.5)
CALCIUM SERPL-MCNC: 9.1 MG/DL — SIGNIFICANT CHANGE UP (ref 8.4–10.5)
CHLORIDE SERPL-SCNC: 101 MMOL/L — SIGNIFICANT CHANGE UP (ref 96–108)
CHLORIDE SERPL-SCNC: 106 MMOL/L — SIGNIFICANT CHANGE UP (ref 96–108)
CHLORIDE SERPL-SCNC: 99 MMOL/L — SIGNIFICANT CHANGE UP (ref 96–108)
CO2 SERPL-SCNC: 21 MMOL/L — LOW (ref 22–31)
CO2 SERPL-SCNC: 21 MMOL/L — LOW (ref 22–31)
CO2 SERPL-SCNC: 24 MMOL/L — SIGNIFICANT CHANGE UP (ref 22–31)
COLOR SPEC: YELLOW — SIGNIFICANT CHANGE UP
CREAT SERPL-MCNC: 0.42 MG/DL — LOW (ref 0.5–1.3)
CREAT SERPL-MCNC: 0.51 MG/DL — SIGNIFICANT CHANGE UP (ref 0.5–1.3)
CREAT SERPL-MCNC: 0.57 MG/DL — SIGNIFICANT CHANGE UP (ref 0.5–1.3)
DIFF PNL FLD: NEGATIVE — SIGNIFICANT CHANGE UP
EOSINOPHIL # BLD AUTO: 0 K/UL — SIGNIFICANT CHANGE UP (ref 0–0.5)
EOSINOPHIL NFR BLD AUTO: 0 % — SIGNIFICANT CHANGE UP (ref 0–6)
GAS PNL BLDA: SIGNIFICANT CHANGE UP
GLUCOSE BLDC GLUCOMTR-MCNC: 109 MG/DL — HIGH (ref 70–99)
GLUCOSE BLDC GLUCOMTR-MCNC: 164 MG/DL — HIGH (ref 70–99)
GLUCOSE BLDC GLUCOMTR-MCNC: 202 MG/DL — HIGH (ref 70–99)
GLUCOSE BLDC GLUCOMTR-MCNC: 202 MG/DL — HIGH (ref 70–99)
GLUCOSE BLDC GLUCOMTR-MCNC: 210 MG/DL — HIGH (ref 70–99)
GLUCOSE BLDC GLUCOMTR-MCNC: 224 MG/DL — HIGH (ref 70–99)
GLUCOSE BLDC GLUCOMTR-MCNC: 231 MG/DL — HIGH (ref 70–99)
GLUCOSE BLDC GLUCOMTR-MCNC: 241 MG/DL — HIGH (ref 70–99)
GLUCOSE BLDC GLUCOMTR-MCNC: 243 MG/DL — HIGH (ref 70–99)
GLUCOSE BLDC GLUCOMTR-MCNC: 245 MG/DL — HIGH (ref 70–99)
GLUCOSE BLDC GLUCOMTR-MCNC: 283 MG/DL — HIGH (ref 70–99)
GLUCOSE SERPL-MCNC: 193 MG/DL — HIGH (ref 70–99)
GLUCOSE SERPL-MCNC: 221 MG/DL — HIGH (ref 70–99)
GLUCOSE SERPL-MCNC: 222 MG/DL — HIGH (ref 70–99)
GLUCOSE UR QL: NEGATIVE — SIGNIFICANT CHANGE UP
HCT VFR BLD CALC: 31.8 % — LOW (ref 34.5–45)
HCT VFR BLD CALC: 36.2 % — SIGNIFICANT CHANGE UP (ref 34.5–45)
HCT VFR BLD CALC: 44.3 % — SIGNIFICANT CHANGE UP (ref 34.5–45)
HGB BLD-MCNC: 10.9 G/DL — LOW (ref 11.5–15.5)
HGB BLD-MCNC: 12.3 G/DL — SIGNIFICANT CHANGE UP (ref 11.5–15.5)
HGB BLD-MCNC: 14.6 G/DL — SIGNIFICANT CHANGE UP (ref 11.5–15.5)
INR BLD: 1.24 — HIGH (ref 0.88–1.16)
INR BLD: 1.37 — HIGH (ref 0.88–1.16)
INR BLD: 1.48 — HIGH (ref 0.88–1.16)
KETONES UR-MCNC: 15 MG/DL
LACTATE SERPL-SCNC: 2.4 MMOL/L — HIGH (ref 0.5–2)
LEUKOCYTE ESTERASE UR-ACNC: NEGATIVE — SIGNIFICANT CHANGE UP
LYMPHOCYTES # BLD AUTO: 10.5 % — LOW (ref 13–44)
LYMPHOCYTES # BLD AUTO: 2.75 K/UL — SIGNIFICANT CHANGE UP (ref 1–3.3)
MAGNESIUM SERPL-MCNC: 1.5 MG/DL — LOW (ref 1.6–2.6)
MAGNESIUM SERPL-MCNC: 1.8 MG/DL — SIGNIFICANT CHANGE UP (ref 1.6–2.6)
MAGNESIUM SERPL-MCNC: 2.3 MG/DL — SIGNIFICANT CHANGE UP (ref 1.6–2.6)
MCHC RBC-ENTMCNC: 31.3 PG — SIGNIFICANT CHANGE UP (ref 27–34)
MCHC RBC-ENTMCNC: 32 PG — SIGNIFICANT CHANGE UP (ref 27–34)
MCHC RBC-ENTMCNC: 32 PG — SIGNIFICANT CHANGE UP (ref 27–34)
MCHC RBC-ENTMCNC: 33 GM/DL — SIGNIFICANT CHANGE UP (ref 32–36)
MCHC RBC-ENTMCNC: 34 GM/DL — SIGNIFICANT CHANGE UP (ref 32–36)
MCHC RBC-ENTMCNC: 34.3 GM/DL — SIGNIFICANT CHANGE UP (ref 32–36)
MCV RBC AUTO: 93.3 FL — SIGNIFICANT CHANGE UP (ref 80–100)
MCV RBC AUTO: 94.3 FL — SIGNIFICANT CHANGE UP (ref 80–100)
MCV RBC AUTO: 94.9 FL — SIGNIFICANT CHANGE UP (ref 80–100)
MONOCYTES # BLD AUTO: 1.39 K/UL — HIGH (ref 0–0.9)
MONOCYTES NFR BLD AUTO: 5.3 % — SIGNIFICANT CHANGE UP (ref 2–14)
NEUTROPHILS # BLD AUTO: 21.8 K/UL — HIGH (ref 1.8–7.4)
NEUTROPHILS NFR BLD AUTO: 83.3 % — HIGH (ref 43–77)
NITRITE UR-MCNC: NEGATIVE — SIGNIFICANT CHANGE UP
NRBC # BLD: 0 /100 WBCS — SIGNIFICANT CHANGE UP (ref 0–0)
NRBC # BLD: 0 /100 WBCS — SIGNIFICANT CHANGE UP (ref 0–0)
PH UR: 5.5 — SIGNIFICANT CHANGE UP (ref 5–8)
PHOSPHATE SERPL-MCNC: 3.5 MG/DL — SIGNIFICANT CHANGE UP (ref 2.5–4.5)
PHOSPHATE SERPL-MCNC: 3.9 MG/DL — SIGNIFICANT CHANGE UP (ref 2.5–4.5)
PLATELET # BLD AUTO: 230 K/UL — SIGNIFICANT CHANGE UP (ref 150–400)
PLATELET # BLD AUTO: 270 K/UL — SIGNIFICANT CHANGE UP (ref 150–400)
PLATELET # BLD AUTO: 297 K/UL — SIGNIFICANT CHANGE UP (ref 150–400)
POTASSIUM SERPL-MCNC: 3.6 MMOL/L — SIGNIFICANT CHANGE UP (ref 3.5–5.3)
POTASSIUM SERPL-MCNC: 4 MMOL/L — SIGNIFICANT CHANGE UP (ref 3.5–5.3)
POTASSIUM SERPL-MCNC: 4.4 MMOL/L — SIGNIFICANT CHANGE UP (ref 3.5–5.3)
POTASSIUM SERPL-SCNC: 3.6 MMOL/L — SIGNIFICANT CHANGE UP (ref 3.5–5.3)
POTASSIUM SERPL-SCNC: 4 MMOL/L — SIGNIFICANT CHANGE UP (ref 3.5–5.3)
POTASSIUM SERPL-SCNC: 4.4 MMOL/L — SIGNIFICANT CHANGE UP (ref 3.5–5.3)
PROT SERPL-MCNC: 6 G/DL — SIGNIFICANT CHANGE UP (ref 6–8.3)
PROT SERPL-MCNC: 7.3 G/DL — SIGNIFICANT CHANGE UP (ref 6–8.3)
PROT SERPL-MCNC: 8 G/DL — SIGNIFICANT CHANGE UP (ref 6–8.3)
PROT UR-MCNC: NEGATIVE MG/DL — SIGNIFICANT CHANGE UP
PROTHROM AB SERPL-ACNC: 14.1 SEC — HIGH (ref 10–12.9)
PROTHROM AB SERPL-ACNC: 15.6 SEC — HIGH (ref 10–12.9)
PROTHROM AB SERPL-ACNC: 16.9 SEC — HIGH (ref 10–12.9)
RBC # BLD: 3.41 M/UL — LOW (ref 3.8–5.2)
RBC # BLD: 3.84 M/UL — SIGNIFICANT CHANGE UP (ref 3.8–5.2)
RBC # BLD: 4.67 M/UL — SIGNIFICANT CHANGE UP (ref 3.8–5.2)
RBC # FLD: 11.4 % — SIGNIFICANT CHANGE UP (ref 10.3–14.5)
RBC # FLD: 11.4 % — SIGNIFICANT CHANGE UP (ref 10.3–14.5)
RBC # FLD: 11.5 % — SIGNIFICANT CHANGE UP (ref 10.3–14.5)
RH IG SCN BLD-IMP: POSITIVE — SIGNIFICANT CHANGE UP
SODIUM SERPL-SCNC: 135 MMOL/L — SIGNIFICANT CHANGE UP (ref 135–145)
SODIUM SERPL-SCNC: 137 MMOL/L — SIGNIFICANT CHANGE UP (ref 135–145)
SODIUM SERPL-SCNC: 139 MMOL/L — SIGNIFICANT CHANGE UP (ref 135–145)
SP GR SPEC: 1.02 — SIGNIFICANT CHANGE UP (ref 1–1.03)
UROBILINOGEN FLD QL: 0.2 E.U./DL — SIGNIFICANT CHANGE UP
WBC # BLD: 13.25 K/UL — HIGH (ref 3.8–10.5)
WBC # BLD: 24.11 K/UL — HIGH (ref 3.8–10.5)
WBC # BLD: 26.17 K/UL — HIGH (ref 3.8–10.5)
WBC # FLD AUTO: 13.25 K/UL — HIGH (ref 3.8–10.5)
WBC # FLD AUTO: 24.11 K/UL — HIGH (ref 3.8–10.5)
WBC # FLD AUTO: 26.17 K/UL — HIGH (ref 3.8–10.5)

## 2019-11-21 PROCEDURE — 93010 ELECTROCARDIOGRAM REPORT: CPT

## 2019-11-21 PROCEDURE — 33533 CABG ARTERIAL SINGLE: CPT | Mod: AS

## 2019-11-21 PROCEDURE — 71045 X-RAY EXAM CHEST 1 VIEW: CPT | Mod: 26

## 2019-11-21 PROCEDURE — 99292 CRITICAL CARE ADDL 30 MIN: CPT

## 2019-11-21 PROCEDURE — 76998 US GUIDE INTRAOP: CPT | Mod: 26,59

## 2019-11-21 PROCEDURE — 33535 CABG ARTERIAL THREE: CPT

## 2019-11-21 PROCEDURE — 99291 CRITICAL CARE FIRST HOUR: CPT

## 2019-11-21 PROCEDURE — 33535 CABG ARTERIAL THREE: CPT | Mod: AS

## 2019-11-21 PROCEDURE — 33535 CABG ARTERIAL THREE: CPT | Mod: 80

## 2019-11-21 PROCEDURE — 99233 SBSQ HOSP IP/OBS HIGH 50: CPT

## 2019-11-21 RX ORDER — DEXTROSE 50 % IN WATER 50 %
50 SYRINGE (ML) INTRAVENOUS
Refills: 0 | Status: DISCONTINUED | OUTPATIENT
Start: 2019-11-21 | End: 2019-11-24

## 2019-11-21 RX ORDER — SODIUM CHLORIDE 9 MG/ML
1000 INJECTION INTRAMUSCULAR; INTRAVENOUS; SUBCUTANEOUS
Refills: 0 | Status: DISCONTINUED | OUTPATIENT
Start: 2019-11-21 | End: 2019-11-24

## 2019-11-21 RX ORDER — HEPARIN SODIUM 5000 [USP'U]/ML
5000 INJECTION INTRAVENOUS; SUBCUTANEOUS EVERY 8 HOURS
Refills: 0 | Status: DISCONTINUED | OUTPATIENT
Start: 2019-11-21 | End: 2019-11-25

## 2019-11-21 RX ORDER — INSULIN HUMAN 100 [IU]/ML
1 INJECTION, SOLUTION SUBCUTANEOUS
Qty: 100 | Refills: 0 | Status: DISCONTINUED | OUTPATIENT
Start: 2019-11-21 | End: 2019-11-22

## 2019-11-21 RX ORDER — ALBUMIN HUMAN 25 %
250 VIAL (ML) INTRAVENOUS ONCE
Refills: 0 | Status: COMPLETED | OUTPATIENT
Start: 2019-11-21 | End: 2019-11-21

## 2019-11-21 RX ORDER — MAGNESIUM SULFATE 500 MG/ML
2 VIAL (ML) INJECTION
Refills: 0 | Status: COMPLETED | OUTPATIENT
Start: 2019-11-21 | End: 2019-11-21

## 2019-11-21 RX ORDER — ACETAMINOPHEN 500 MG
650 TABLET ORAL EVERY 6 HOURS
Refills: 0 | Status: DISCONTINUED | OUTPATIENT
Start: 2019-11-21 | End: 2019-11-25

## 2019-11-21 RX ORDER — ASPIRIN/CALCIUM CARB/MAGNESIUM 324 MG
81 TABLET ORAL DAILY
Refills: 0 | Status: DISCONTINUED | OUTPATIENT
Start: 2019-11-21 | End: 2019-11-25

## 2019-11-21 RX ORDER — CEFAZOLIN SODIUM 1 G
2000 VIAL (EA) INJECTION EVERY 8 HOURS
Refills: 0 | Status: DISCONTINUED | OUTPATIENT
Start: 2019-11-21 | End: 2019-11-21

## 2019-11-21 RX ORDER — DEXTROSE 50 % IN WATER 50 %
25 SYRINGE (ML) INTRAVENOUS
Refills: 0 | Status: DISCONTINUED | OUTPATIENT
Start: 2019-11-21 | End: 2019-11-24

## 2019-11-21 RX ORDER — CLEVIDIPINE BUTYRATE 50MG/100ML
2 VIAL (ML) INTRAVENOUS
Qty: 25 | Refills: 0 | Status: DISCONTINUED | OUTPATIENT
Start: 2019-11-21 | End: 2019-11-22

## 2019-11-21 RX ORDER — PROPOFOL 10 MG/ML
5 INJECTION, EMULSION INTRAVENOUS
Qty: 1000 | Refills: 0 | Status: DISCONTINUED | OUTPATIENT
Start: 2019-11-21 | End: 2019-11-21

## 2019-11-21 RX ORDER — HYDROMORPHONE HYDROCHLORIDE 2 MG/ML
0.5 INJECTION INTRAMUSCULAR; INTRAVENOUS; SUBCUTANEOUS ONCE
Refills: 0 | Status: DISCONTINUED | OUTPATIENT
Start: 2019-11-21 | End: 2019-11-21

## 2019-11-21 RX ORDER — OXYCODONE AND ACETAMINOPHEN 5; 325 MG/1; MG/1
1 TABLET ORAL EVERY 6 HOURS
Refills: 0 | Status: DISCONTINUED | OUTPATIENT
Start: 2019-11-21 | End: 2019-11-25

## 2019-11-21 RX ORDER — OXYCODONE AND ACETAMINOPHEN 5; 325 MG/1; MG/1
2 TABLET ORAL EVERY 6 HOURS
Refills: 0 | Status: DISCONTINUED | OUTPATIENT
Start: 2019-11-21 | End: 2019-11-25

## 2019-11-21 RX ORDER — CLOPIDOGREL BISULFATE 75 MG/1
75 TABLET, FILM COATED ORAL DAILY
Refills: 0 | Status: DISCONTINUED | OUTPATIENT
Start: 2019-11-21 | End: 2019-11-25

## 2019-11-21 RX ORDER — ATORVASTATIN CALCIUM 80 MG/1
40 TABLET, FILM COATED ORAL AT BEDTIME
Refills: 0 | Status: DISCONTINUED | OUTPATIENT
Start: 2019-11-21 | End: 2019-11-25

## 2019-11-21 RX ORDER — POLYETHYLENE GLYCOL 3350 17 G/17G
17 POWDER, FOR SOLUTION ORAL DAILY
Refills: 0 | Status: DISCONTINUED | OUTPATIENT
Start: 2019-11-21 | End: 2019-11-25

## 2019-11-21 RX ORDER — CALCIUM GLUCONATE 100 MG/ML
2 VIAL (ML) INTRAVENOUS ONCE
Refills: 0 | Status: COMPLETED | OUTPATIENT
Start: 2019-11-21 | End: 2019-11-21

## 2019-11-21 RX ORDER — CHLORHEXIDINE GLUCONATE 213 G/1000ML
5 SOLUTION TOPICAL EVERY 4 HOURS
Refills: 0 | Status: DISCONTINUED | OUTPATIENT
Start: 2019-11-21 | End: 2019-11-22

## 2019-11-21 RX ORDER — FENTANYL CITRATE 50 UG/ML
25 INJECTION INTRAVENOUS ONCE
Refills: 0 | Status: DISCONTINUED | OUTPATIENT
Start: 2019-11-21 | End: 2019-11-21

## 2019-11-21 RX ORDER — CEFAZOLIN SODIUM 1 G
2000 VIAL (EA) INJECTION EVERY 8 HOURS
Refills: 0 | Status: COMPLETED | OUTPATIENT
Start: 2019-11-21 | End: 2019-11-23

## 2019-11-21 RX ORDER — POTASSIUM CHLORIDE 20 MEQ
20 PACKET (EA) ORAL
Refills: 0 | Status: DISCONTINUED | OUTPATIENT
Start: 2019-11-21 | End: 2019-11-21

## 2019-11-21 RX ORDER — PANTOPRAZOLE SODIUM 20 MG/1
40 TABLET, DELAYED RELEASE ORAL DAILY
Refills: 0 | Status: DISCONTINUED | OUTPATIENT
Start: 2019-11-21 | End: 2019-11-21

## 2019-11-21 RX ORDER — PANTOPRAZOLE SODIUM 20 MG/1
40 TABLET, DELAYED RELEASE ORAL
Refills: 0 | Status: DISCONTINUED | OUTPATIENT
Start: 2019-11-21 | End: 2019-11-25

## 2019-11-21 RX ORDER — CHLORHEXIDINE GLUCONATE 213 G/1000ML
1 SOLUTION TOPICAL
Refills: 0 | Status: DISCONTINUED | OUTPATIENT
Start: 2019-11-21 | End: 2019-11-24

## 2019-11-21 RX ORDER — CHLORHEXIDINE GLUCONATE 213 G/1000ML
15 SOLUTION TOPICAL EVERY 12 HOURS
Refills: 0 | Status: DISCONTINUED | OUTPATIENT
Start: 2019-11-21 | End: 2019-11-21

## 2019-11-21 RX ORDER — POTASSIUM CHLORIDE 20 MEQ
20 PACKET (EA) ORAL
Refills: 0 | Status: COMPLETED | OUTPATIENT
Start: 2019-11-21 | End: 2019-11-21

## 2019-11-21 RX ADMIN — CHLORHEXIDINE GLUCONATE 15 MILLILITER(S): 213 SOLUTION TOPICAL at 19:07

## 2019-11-21 RX ADMIN — Medication 4 MG/HR: at 20:03

## 2019-11-21 RX ADMIN — Medication 4: at 05:25

## 2019-11-21 RX ADMIN — CHLORHEXIDINE GLUCONATE 5 MILLILITER(S): 213 SOLUTION TOPICAL at 19:07

## 2019-11-21 RX ADMIN — PANTOPRAZOLE SODIUM 40 MILLIGRAM(S): 20 TABLET, DELAYED RELEASE ORAL at 05:25

## 2019-11-21 RX ADMIN — CHLORHEXIDINE GLUCONATE 5 MILLILITER(S): 213 SOLUTION TOPICAL at 22:20

## 2019-11-21 RX ADMIN — HEPARIN SODIUM 5000 UNIT(S): 5000 INJECTION INTRAVENOUS; SUBCUTANEOUS at 22:21

## 2019-11-21 RX ADMIN — Medication 81 MILLIGRAM(S): at 22:21

## 2019-11-21 RX ADMIN — Medication 125 MILLILITER(S): at 21:48

## 2019-11-21 RX ADMIN — Medication 2000 MILLIGRAM(S): at 22:21

## 2019-11-21 RX ADMIN — Medication 100 MILLIEQUIVALENT(S): at 16:42

## 2019-11-21 RX ADMIN — Medication 4 MG/HR: at 15:00

## 2019-11-21 RX ADMIN — Medication 50 GRAM(S): at 16:49

## 2019-11-21 RX ADMIN — HYDROMORPHONE HYDROCHLORIDE 0.5 MILLIGRAM(S): 2 INJECTION INTRAMUSCULAR; INTRAVENOUS; SUBCUTANEOUS at 17:55

## 2019-11-21 RX ADMIN — OXYCODONE AND ACETAMINOPHEN 2 TABLET(S): 5; 325 TABLET ORAL at 22:00

## 2019-11-21 RX ADMIN — FENTANYL CITRATE 25 MICROGRAM(S): 50 INJECTION INTRAVENOUS at 14:20

## 2019-11-21 RX ADMIN — INSULIN HUMAN 1 UNIT(S)/HR: 100 INJECTION, SOLUTION SUBCUTANEOUS at 15:02

## 2019-11-21 RX ADMIN — CHLORHEXIDINE GLUCONATE 5 MILLILITER(S): 213 SOLUTION TOPICAL at 15:48

## 2019-11-21 RX ADMIN — PROPOFOL 2.6 MICROGRAM(S)/KG/MIN: 10 INJECTION, EMULSION INTRAVENOUS at 15:01

## 2019-11-21 RX ADMIN — Medication 100 MILLIEQUIVALENT(S): at 15:44

## 2019-11-21 RX ADMIN — HYDROMORPHONE HYDROCHLORIDE 0.5 MILLIGRAM(S): 2 INJECTION INTRAMUSCULAR; INTRAVENOUS; SUBCUTANEOUS at 20:04

## 2019-11-21 RX ADMIN — OXYCODONE AND ACETAMINOPHEN 1 TABLET(S): 5; 325 TABLET ORAL at 23:00

## 2019-11-21 RX ADMIN — PANTOPRAZOLE SODIUM 40 MILLIGRAM(S): 20 TABLET, DELAYED RELEASE ORAL at 15:48

## 2019-11-21 RX ADMIN — FENTANYL CITRATE 25 MICROGRAM(S): 50 INJECTION INTRAVENOUS at 15:04

## 2019-11-21 RX ADMIN — OXYCODONE AND ACETAMINOPHEN 2 TABLET(S): 5; 325 TABLET ORAL at 21:22

## 2019-11-21 RX ADMIN — Medication 4 MG/HR: at 15:48

## 2019-11-21 RX ADMIN — PROPOFOL 2.6 MICROGRAM(S)/KG/MIN: 10 INJECTION, EMULSION INTRAVENOUS at 15:48

## 2019-11-21 RX ADMIN — Medication 25 MILLIGRAM(S): at 05:24

## 2019-11-21 RX ADMIN — OXYCODONE AND ACETAMINOPHEN 1 TABLET(S): 5; 325 TABLET ORAL at 23:08

## 2019-11-21 RX ADMIN — ATORVASTATIN CALCIUM 40 MILLIGRAM(S): 80 TABLET, FILM COATED ORAL at 22:21

## 2019-11-21 RX ADMIN — HYDROMORPHONE HYDROCHLORIDE 0.5 MILLIGRAM(S): 2 INJECTION INTRAMUSCULAR; INTRAVENOUS; SUBCUTANEOUS at 19:25

## 2019-11-21 RX ADMIN — HYDROMORPHONE HYDROCHLORIDE 0.5 MILLIGRAM(S): 2 INJECTION INTRAMUSCULAR; INTRAVENOUS; SUBCUTANEOUS at 17:24

## 2019-11-21 RX ADMIN — CHLORHEXIDINE GLUCONATE 20 MILLILITER(S): 213 SOLUTION TOPICAL at 05:40

## 2019-11-21 RX ADMIN — CHLORHEXIDINE GLUCONATE 1 APPLICATION(S): 213 SOLUTION TOPICAL at 05:41

## 2019-11-21 RX ADMIN — Medication 200 GRAM(S): at 16:50

## 2019-11-21 RX ADMIN — Medication 50 GRAM(S): at 17:24

## 2019-11-21 RX ADMIN — CLOPIDOGREL BISULFATE 75 MILLIGRAM(S): 75 TABLET, FILM COATED ORAL at 22:21

## 2019-11-21 RX ADMIN — SODIUM CHLORIDE 10 MILLILITER(S): 9 INJECTION INTRAMUSCULAR; INTRAVENOUS; SUBCUTANEOUS at 15:02

## 2019-11-21 NOTE — PROGRESS NOTE ADULT - SUBJECTIVE AND OBJECTIVE BOX
CTICU  CRITICAL  CARE  attending     Hand off received 					   Pertinent clinical, laboratory, radiographic, hemodynamic, echocardiographic, respiratory data, microbiologic data and chart were reviewed and analyzed frequently throughout the course of the day and night    Patient seen and examined with CTS/ SH attending at bedside    33 y/o F  CURRENT SMOKER' + Marijuana use with strong FHX of CAD ( MI mom at age 40; CABG mom at age 50); PMH of  Type 2 DM, arthritis, GERD who initially presented to [place] ER 19 c/o CP X 1 day. Pt. reports that Saturday night; she had severe CP that woke her up from her sleep; pt. reports that the CP was mid-sternal; "pressure-burning" like sensation radiating to B/L UE associated with SOB; rates it as 10/10; not worse with inspiration or movement. She first thought that it might be 2/2 her GERD; took protonix and milk with no improvement in her sx which prompted her to go to the ED.  At baseline; pt. reports of having CP associated with SOB on climbing 2-3 flights of stairs which is worse with exertion and relieved with rest occuring for the last few months; She reports that her PCP was supposed to give her referral to see a cardiologist.  Pt denies dizziness, diaphoresis, fatigue, LE edema, palpitations, orthopnea, PND, syncope At [facility] ; pt ruled in NSTEMI and was admitted for further management of NSTEMI. Troponin I 0.2333> 3.370> 5.3> 8.73 >5.6; + THC in urine; Hg 13.6; negative urine test for pregnancy; EKG as per [facility] paperwork NSR with no ST-T changes noted; At [facility] pt was started on Hep gtt for NSTEMI; loaded with  mg PO X 1 and Plavix 300 mg PO X 1 on 19 and  started on ASA 81 ng daily; Plavix 75 mg daily; Lopressor 25 mg BID; Lisinopril 2.5 mg daily and Lipitor 80 mg daily.  Pt is now transferred to [facility] for cardiac cath  2/2 pt. risk factors, CCS class 4  sx and R/I NSTEMI.  On arrival to [facility]; Pt denies any CP, SOB, dizziness, diaphoresis, fatigue, LE edema, palpitations, orthopnea, PND, syncope; recent sick contacts/illness; URI or UTI sx. /91, HR 89, RR 16, T 97.2 O2 99 RA; EKG:  NSR @ 74 bpm; q wave inferior leads; LAD; no ST-T changes noted.    HEALTH ISSUES - PROBLEM Dx:  Prophylactic measure: Prophylactic measure  HLD (hyperlipidemia): HLD (hyperlipidemia)  Smoking: Smoking  HTN (hypertension): HTN (hypertension)  Type 2 diabetes mellitus: Type 2 diabetes mellitus  NSTEMI (non-ST elevated myocardial infarction): NSTEMI (non-ST elevated myocardial infarction)      FAMILY HISTORY:  Family history of CABG: mom (age 50s)  FH: myocardial infarction: mom: age 40s s/p PCI  FH: CAD (coronary artery disease): mom; grandmother  PAST MEDICAL & SURGICAL HISTORY:  H/O gastroesophageal reflux (GERD)  Type 2 diabetes mellitus: Diabetes  Encounter for Essure implantation  H/O hernia repair  S/P     Patient is a 34y old  Female who presents with a chief complaint of NSTEMI  WORK UP: Triple vessel CAD.    14 system review was unremarkable    Vital signs, hemodynamic and respiratory parameters were reviewed from the bedside nursing flow sheet.  ICU Vital Signs Last 24 Hrs  T(C): 37.1 (2019 21:09), Max: 37.1 (2019 17:22)  T(F): 98.8 (2019 21:09), Max: 98.8 (2019 17:22)  HR: 108 (2019 23:00) (76 - 112)  BP: 152/90 (2019 17:30) (128/80 - 152/90)  BP(mean): 111 (2019 17:30) (111 - 111)  ABP: 116/60 (2019 23:00) (116/60 - 154/76)  ABP(mean): 78 (2019 23:00) (78 - 100)  RR: 19 (2019 23:00) (14 - 31)  SpO2: 98% (2019 23:00) (96% - 100%)    Adult Advanced Hemodynamics Last 24 Hrs  CVP(mm Hg): 1 (2019 23:00) (-5 - 42)  CVP(cm H2O): --  CO: --  CI: --  PA: --  PA(mean): --  PCWP: --  SVR: --  SVRI: --  PVR: --  PVRI: --, ABG - ( 2019 21:01 )  pH, Arterial: 7.44  pH, Blood: x     /  pCO2: 33    /  pO2: 131   / HCO3: 21    / Base Excess: -2.0  /  SaO2: 99                Mode: AC/ CMV (Assist Control/ Continuous Mandatory Ventilation)  RR (machine): 12  TV (machine): 500  FiO2: 60  PEEP: 5  MAP: 10  PIP: 15    Intake and output was reviewed and the fluid balance was calculated  Daily Height in cm: 175.26 (2019 02:08)    Daily Weight in k.5 (2019 05:28)  I&O's Summary    2019 07:  -  2019 07:00  --------------------------------------------------------  IN: 540 mL / OUT: 0 mL / NET: 540 mL    2019 07:01  -  2019 23:28  --------------------------------------------------------  IN: 812 mL / OUT: 1510 mL / NET: -698 mL        All lines and drain sites were assessed      Neuro: No change in the mental status from the baseline. Moves all 4 extremities.  Neck: No JVD.  CVS: S1, S1, No S3.  Lungs: Good air entry bilaterally.   Abd: Soft. No tenderness. + Bowel sounds.  Vascular: + DP/PT.  Extremities: No edema.  Lymphatic: Normal.  Skin: No abnormalities.      labs  CBC Full  -  ( 2019 20:50 )  WBC Count : 24.11 K/uL  RBC Count : 3.84 M/uL  Hemoglobin : 12.3 g/dL  Hematocrit : 36.2 %  Platelet Count - Automated : 270 K/uL  Mean Cell Volume : 94.3 fl  Mean Cell Hemoglobin : 32.0 pg  Mean Cell Hemoglobin Concentration : 34.0 gm/dL  Auto Neutrophil # : x  Auto Lymphocyte # : x  Auto Monocyte # : x  Auto Eosinophil # : x  Auto Basophil # : x  Auto Neutrophil % : x  Auto Lymphocyte % : x  Auto Monocyte % : x  Auto Eosinophil % : x  Auto Basophil % : x        137  |  101  |  11  ----------------------------<  222<H>  4.0   |  21<L>  |  0.42<L>    Ca    9.1      2019 20:50  Phos  3.5       Mg     2.3         TPro  7.3  /  Alb  4.4  /  TBili  0.4  /  DBili  x   /  AST  24  /  ALT  17  /  AlkPhos  52      PT/INR - ( 2019 20:50 )   PT: 15.6 sec;   INR: 1.37          PTT - ( 2019 20:50 )  PTT:30.5 sec  The current medications were reviewed   MEDICATIONS  (STANDING):  aspirin enteric coated 81 milliGRAM(s) Oral daily  atorvastatin 40 milliGRAM(s) Oral at bedtime  ceFAZolin  Injectable. 2000 milliGRAM(s) IV Push every 8 hours  chlorhexidine 0.12% Liquid 5 milliLiter(s) Oral Mucosa every 4 hours  chlorhexidine 2% Cloths 1 Application(s) Topical <User Schedule>  clevidipine Infusion 2 mG/Hr (4 mL/Hr) IV Continuous <Continuous>  clopidogrel Tablet 75 milliGRAM(s) Oral daily  dextrose 50% Injectable 50 milliLiter(s) IV Push every 15 minutes  dextrose 50% Injectable 25 milliLiter(s) IV Push every 15 minutes  heparin  Injectable 5000 Unit(s) SubCutaneous every 8 hours  insulin regular Infusion 1 Unit(s)/Hr (1 mL/Hr) IV Continuous <Continuous>  pantoprazole    Tablet 40 milliGRAM(s) Oral before breakfast  polyethylene glycol 3350 17 Gram(s) Oral daily  sodium chloride 0.9%. 1000 milliLiter(s) (10 mL/Hr) IV Continuous <Continuous>    MEDICATIONS  (PRN):  acetaminophen   Tablet .. 650 milliGRAM(s) Oral every 6 hours PRN Mild Pain (1 - 3)  oxycodone    5 mG/acetaminophen 325 mG 2 Tablet(s) Oral every 6 hours PRN Severe Pain (7 - 10)  oxycodone    5 mG/acetaminophen 325 mG 1 Tablet(s) Oral every 6 hours PRN Moderate Pain (4 - 6)        PROBLEM LIST/ ASSESSMENT:  HEALTH ISSUES - PROBLEM Dx:  Prophylactic measure: Prophylactic measure  HLD (hyperlipidemia): HLD (hyperlipidemia)  Smoking: Smoking  HTN (hypertension): HTN (hypertension)  Type 2 diabetes mellitus: Type 2 diabetes mellitus  NSTEMI (non-ST elevated myocardial infarction): NSTEMI (non-ST elevated myocardial infarction)      Patient is a 34y old  Female who presented with NSTEMI   LHC: Triple vessel CAD.  S/P Off pump CABG X 3.  Uncontrolled DM.  Metabolic Acidosis  Hemodynamically stable.  Good oxygenation.  Fair urine out put.  Overall doing well.      My plan includes :  Statin and Betablocker.  Dual antiplatelet  Rx.  Close hemodynamic, ventilatory and drain monitoring and management  Monitor for arrhythmias and monitor parameters for organ perfusion  Monitor neurologic status  Monitor renal function.  Head of the bed should remain elevated to 45 deg .   Chest PT and IS will be encouraged  Monitor  adequacy of oxygenation and ventilation and attempt to wean oxygen  Nutritional goals will be met using po eventually , ensure adequate caloric intake and monitor the same  Stress ulcer and VTE prophylaxis will be achieved    OPTIMIZE Glycemic control.  Electrolytes have been repleted as necessary and wound care has been carried out. Pain control has been achieved.   Aggressive physical therapy and early mobility and ambulation goals will be met   The family was updated about the course and plan  CRITICAL CARE TIME SPENT in evaluation and management, reassessments, review and interpretation of labs and x-rays, ventilator and hemodynamic management, formulating a plan and coordinating care: ___60____ MIN.  Time does not include procedural time.  CTICU ATTENDING     					    Alejandro Aden MD

## 2019-11-21 NOTE — PROGRESS NOTE ADULT - SUBJECTIVE AND OBJECTIVE BOX
CTICU  CRITICAL  CARE  attending     Hand off received 					   Pertinent clinical, laboratory, radiographic, hemodynamic, echocardiographic, respiratory data, microbiologic data and chart were reviewed and analyzed frequently throughout the course of the day and night  Patient seen and examined with CTS/ SH attending at bedside    Pt is a 34y , Female, post op day s/p OPCAB x 3V  Bilateral internal mammaries; and a radial graft  PMHx significant for uncontrolled DM    intraop:    no blood/products  2.5L LR  500 ml Albumin 5%   cc      post op:    intubated  cleviprex for HTN  Vent weaning      Prophylactic measure: Prophylactic measure  HLD (hyperlipidemia): HLD (hyperlipidemia)  Smoking: Smoking  HTN (hypertension): HTN (hypertension)  Type 2 diabetes mellitus: Type 2 diabetes mellitus  NSTEMI (non-ST elevated myocardial infarction): NSTEMI (non-ST elevated myocardial infarction)      FAMILY HISTORY:  Family history of CABG: mom (age 50s)  FH: myocardial infarction: mom: age 40s s/p PCI  FH: CAD (coronary artery disease): mom; grandmother  PAST MEDICAL & SURGICAL HISTORY:  H/O gastroesophageal reflux (GERD)  Type 2 diabetes mellitus: Diabetes  Encounter for Essure implantation  H/O hernia repair  S/P     Patient is a 34y old  Female who presents with a chief complaint of NSTEMI (2019 22:20)    14 system review was unremarkable    Vital signs, hemodynamic and respiratory parameters were reviewed from the bedside nursing flowsheet.  ICU Vital Signs Last 24 Hrs  T(C): 36.7 (2019 05:28), Max: 36.7 (2019 05:28)  T(F): 98 (2019 05:28), Max: 98 (2019 05:28)  HR: 100 (2019 14:15) (74 - 102)  BP: 128/80 (2019 05:19) (115/69 - 147/90)  BP(mean): --  ABP: 146/70 (2019 14:15) (124/64 - 146/70)  ABP(mean): 92 (2019 14:15) (84 - 92)  RR: 20 (2019 13:44) (16 - 20)  SpO2: 100% (2019 14:15) (97% - 100%)    Adult Advanced Hemodynamics Last 24 Hrs  CVP(mm Hg): 2 (2019 14:15) (1 - 6)  CVP(cm H2O): --  CO: --  CI: --  PA: --  PA(mean): --  PCWP: --  SVR: --  SVRI: --  PVR: --  PVRI: --, ABG - ( 2019 14:03 )  pH, Arterial: 7.45  pH, Blood: x     /  pCO2: 30    /  pO2: 215   / HCO3: 20    / Base Excess: -2.8  /  SaO2: 99                  Intake and output was reviewed and the fluid balance was calculated  Daily Height in cm: 175.26 (2019 02:08)    Daily Weight in k.5 (2019 05:28)  I&O's Summary    2019 07:  -  2019 07:00  --------------------------------------------------------  IN: 540 mL / OUT: 0 mL / NET: 540 mL    2019 07:01  -  2019 14:30  --------------------------------------------------------  IN: 0 mL / OUT: 45 mL / NET: -45 mL        All lines and drain sites were assessed  Glycemic [facility]      POCT Blood Glucose.: 202 mg/dL (2019 05:05)    No acute change in mental status  Auscultation of the chest reveals equal bs  Abdomen is soft  Extremities are warm and well perfused  Wounds appear clean and unremarkable  Antibiotics are periop    labs  CBC Full  -  ( 2019 13:57 )  WBC Count : 26.17 K/uL  RBC Count : 3.41 M/uL  Hemoglobin : 10.9 g/dL  Hematocrit : 31.8 %  Platelet Count - Automated : 230 K/uL  Mean Cell Volume : 93.3 fl  Mean Cell Hemoglobin : 32.0 pg  Mean Cell Hemoglobin Concentration : 34.3 gm/dL  Auto Neutrophil # : x  Auto Lymphocyte # : x  Auto Monocyte # : x  Auto Eosinophil # : x  Auto Basophil # : x  Auto Neutrophil % : x  Auto Lymphocyte % : x  Auto Monocyte % : x  Auto Eosinophil % : x  Auto Basophil % : x    11    135  |  99  |  22  ----------------------------<  221<H>  4.4   |  24  |  0.57    Ca    10.4      2019 06:13  Mg     1.8         TPro  8.0  /  Alb  4.6  /  TBili  0.6  /  DBili  x   /  AST  15  /  ALT  20  /  AlkPhos  61  11-    PT/INR - ( 2019 13:57 )   PT: 16.9 sec;   INR: 1.48          PTT - ( 2019 13:57 )  PTT:25.3 sec  The current medications were reviewed   MEDICATIONS  (STANDING):  aspirin enteric coated 81 milliGRAM(s) Oral daily  ceFAZolin  Injectable. 2000 milliGRAM(s) IV Push every 8 hours  chlorhexidine 0.12% Liquid 5 milliLiter(s) Oral Mucosa every 4 hours  chlorhexidine 0.12% Liquid 15 milliLiter(s) Oral Mucosa every 12 hours  chlorhexidine 2% Cloths 1 Application(s) Topical <User Schedule>  clevidipine Infusion 2 mG/Hr (4 mL/Hr) IV Continuous <Continuous>  clopidogrel Tablet 75 milliGRAM(s) Oral daily  dextrose 50% Injectable 50 milliLiter(s) IV Push every 15 minutes  dextrose 50% Injectable 25 milliLiter(s) IV Push every 15 minutes  fentaNYL    Injectable 25 MICROGram(s) IV Push once  heparin  Injectable 5000 Unit(s) SubCutaneous every 8 hours  insulin regular Infusion 1 Unit(s)/Hr (1 mL/Hr) IV Continuous <Continuous>  pantoprazole  Injectable 40 milliGRAM(s) IV Push daily  propofol Infusion 5 MICROgram(s)/kG/Min (2.595 mL/Hr) IV Continuous <Continuous>  sodium chloride 0.9%. 1000 milliLiter(s) (10 mL/Hr) IV Continuous <Continuous>    MEDICATIONS  (PRN):       PROBLEM LIST/ ASSESSMENT:  HEALTH ISSUES - PROBLEM Dx:  s/p CABG  Prophylactic measure: Prophylactic measure  HLD (hyperlipidemia): HLD (hyperlipidemia)  Smoking: Smoking  HTN (hypertension): HTN (hypertension)  Type 2 diabetes mellitus: Type 2 diabetes mellitus  NSTEMI (non-ST elevated myocardial infarction): NSTEMI (non-ST elevated myocardial infarction)      ,   Patient is a 34y old  Female who presents with a chief complaint of NSTEMI (2019 22:20)     s/p CABG      My plan includes :  close hemodynamic, ventilatory and drain monitoring and management per post op routine    Monitor for arrhythmias and monitor parameters for organ perfusion  monitor neurologic status  Head of the bed should remain elevated to 45 deg .   chest PT and IS will be encouraged  monitor adequacy of oxygenation and ventilation and attempt to wean oxygen  Nutritional goals will be met using po eventually , ensure adequate caloric intake and montior the same  Stress ulcer and VTE prophylaxis will be achieved    Glycemic control is satisfactory  Electrolytes have been repleted as necessary and wound care has been carried out. Pain control has been achieved.   agressive physical therapy and early mobility and ambulation goals will be met   The family was updated about the course and plan  CRITICAL CARE TIME SPENT in evaluation and management, reassessments, review and interpretation of labs and x-rays, ventilator and hemodynamic management, formulating a plan and coordinating care: ___90____ MIN.  Time does not include procedural time.  CTICU ATTENDING     					    Anurag Rey MD

## 2019-11-21 NOTE — PROGRESS NOTE ADULT - SUBJECTIVE AND OBJECTIVE BOX
INTERVAL HPI/OVERNIGHT EVENTS:    Patient is a 34y old  Female who presents with a chief complaint of NSTEMI (2019 16:28)  now s/p CABG  intubated and sedated, on pressors  started on insulin infusion this morning  NPO  no acute overnight events      MEDICATIONS  (STANDING):  aspirin enteric coated 81 milliGRAM(s) Oral daily  ceFAZolin  Injectable. 2000 milliGRAM(s) IV Push every 8 hours  chlorhexidine 0.12% Liquid 5 milliLiter(s) Oral Mucosa every 4 hours  chlorhexidine 0.12% Liquid 15 milliLiter(s) Oral Mucosa every 12 hours  chlorhexidine 2% Cloths 1 Application(s) Topical <User Schedule>  clevidipine Infusion 2 mG/Hr (4 mL/Hr) IV Continuous <Continuous>  clopidogrel Tablet 75 milliGRAM(s) Oral daily  dextrose 50% Injectable 50 milliLiter(s) IV Push every 15 minutes  dextrose 50% Injectable 25 milliLiter(s) IV Push every 15 minutes  heparin  Injectable 5000 Unit(s) SubCutaneous every 8 hours  insulin regular Infusion 1 Unit(s)/Hr (1 mL/Hr) IV Continuous <Continuous>  pantoprazole  Injectable 40 milliGRAM(s) IV Push daily  propofol Infusion 5 MICROgram(s)/kG/Min (2.595 mL/Hr) IV Continuous <Continuous>  sodium chloride 0.9%. 1000 milliLiter(s) (10 mL/Hr) IV Continuous <Continuous>    MEDICATIONS  (PRN):      Past medical history reviewed  Family history reviewed  Social history reviewed    PHYSICAL EXAM  Vital Signs Last 24 Hrs  T(C): 37.1 (2019 17:22), Max: 37.1 (2019 17:22)  T(F): 98.8 (2019 17:22), Max: 98.8 (2019 17:22)  HR: 104 (2019 18:00) (74 - 112)  BP: 152/90 (2019 17:30) (115/69 - 152/90)  BP(mean): 111 (2019 17:30) (111 - 111)  RR: 16 (2019 19:00) (14 - 31)  SpO2: 98% (2019 19:00) (97% - 100%)    Constitutional: wn/wd in NAD.   HEENT: NCAT, MMM, OP clear, EOMI, no proptosis or lid retraction  Neck: no thyromegaly or palpable thyroid nodules   Respiratory: lungs CTAB. + chest tube X 2  Cardiovascular: regular rhythm, normal S1 and S2, no audible murmurs, no peripheral edema  GI: soft, NT/ND, no masses/HSM appreciated.  Neurology: no tremors, DTR 2+  Skin: no visible rashes/lesions  + griffiths    LABS:                        10.9   26.17 )-----------( 230      ( 2019 13:57 )             31.8         139  |  106  |  17  ----------------------------<  193<H>  3.6   |  21<L>  |  0.51    Ca    8.5      2019 13:57  Phos  3.9       Mg     1.5         TPro  6.0  /  Alb  3.7  /  TBili  0.4  /  DBili  x   /  AST  16  /  ALT  14  /  AlkPhos  43      PT/INR - ( 2019 13:57 )   PT: 16.9 sec;   INR: 1.48          PTT - ( 2019 13:57 )  PTT:25.3 sec  Urinalysis Basic - ( 2019 07:08 )    Color: Yellow / Appearance: Clear / S.025 / pH: x  Gluc: x / Ketone: 15 mg/dL  / Bili: Negative / Urobili: 0.2 E.U./dL   Blood: x / Protein: NEGATIVE mg/dL / Nitrite: NEGATIVE   Leuk Esterase: NEGATIVE / RBC: x / WBC x   Sq Epi: x / Non Sq Epi: x / Bacteria: x      Thyroid Stimulating Hormone, Serum: 1.177 uIU/mL ( @ 06:36)  Thyroid Stimulating Hormone, Serum: 1.218 uIU/mL ( @ 17:02)      HbA1C: 10.6 % ( @ 06:36)  11.0 % ( @ 17:02)    CAPILLARY BLOOD GLUCOSE      POCT Blood Glucose.: 231 mg/dL (2019 18:48)  POCT Blood Glucose.: 245 mg/dL (2019 17:56)  POCT Blood Glucose.: 283 mg/dL (2019 17:19)  POCT Blood Glucose.: 241 mg/dL (2019 16:20)  POCT Blood Glucose.: 243 mg/dL (2019 15:24)  POCT Blood Glucose.: 202 mg/dL (2019 05:05)  POCT Blood Glucose.: 123 mg/dL (2019 21:57)      Direct LDL: 126 mg/dL (19 @ 17:02)

## 2019-11-21 NOTE — PROGRESS NOTE ADULT - SUBJECTIVE AND OBJECTIVE BOX
INTERVAL HPI/OVERNIGHT EVENTS:    Patient is a 34y old  Female who presents with a chief complaint of NSTEMI (2019 14:30)      Pt reports the following symptoms:    CONSTITUTIONAL:  Negative fever or chills, feels well, good appetite  EYES:  Negative  blurry vision or double vision  CARDIOVASCULAR:  Negative for chest pain or palpitations  RESPIRATORY:  Negative for cough, wheezing, or SOB   GASTROINTESTINAL:  Negative for nausea, vomiting, diarrhea, constipation, or abdominal pain  GENITOURINARY:  Negative frequency, urgency or dysuria  NEUROLOGIC:  No headache, confusion, dizziness, lightheadedness    MEDICATIONS  (STANDING):  aspirin enteric coated 81 milliGRAM(s) Oral daily  calcium gluconate IVPB 2 Gram(s) IV Intermittent once  ceFAZolin  Injectable. 2000 milliGRAM(s) IV Push every 8 hours  chlorhexidine 0.12% Liquid 5 milliLiter(s) Oral Mucosa every 4 hours  chlorhexidine 0.12% Liquid 15 milliLiter(s) Oral Mucosa every 12 hours  chlorhexidine 2% Cloths 1 Application(s) Topical <User Schedule>  clevidipine Infusion 2 mG/Hr (4 mL/Hr) IV Continuous <Continuous>  clopidogrel Tablet 75 milliGRAM(s) Oral daily  dextrose 50% Injectable 50 milliLiter(s) IV Push every 15 minutes  dextrose 50% Injectable 25 milliLiter(s) IV Push every 15 minutes  heparin  Injectable 5000 Unit(s) SubCutaneous every 8 hours  insulin regular Infusion 1 Unit(s)/Hr (1 mL/Hr) IV Continuous <Continuous>  magnesium sulfate  IVPB 2 Gram(s) IV Intermittent every 1 hour  pantoprazole  Injectable 40 milliGRAM(s) IV Push daily  potassium chloride  20 mEq/100 mL IVPB 20 milliEquivalent(s) IV Intermittent every 1 hour  propofol Infusion 5 MICROgram(s)/kG/Min (2.595 mL/Hr) IV Continuous <Continuous>  sodium chloride 0.9%. 1000 milliLiter(s) (10 mL/Hr) IV Continuous <Continuous>    MEDICATIONS  (PRN):      Past medical history reviewed  Family history reviewed  Social history reviewed    PHYSICAL EXAM  Vital Signs Last 24 Hrs  T(C): 36.3 (2019 13:44), Max: 36.7 (2019 05:28)  T(F): 97.4 (2019 13:44), Max: 98 (2019 05:28)  HR: 112 (2019 16:00) (74 - 112)  BP: 128/80 (2019 05:19) (115/69 - 147/90)  BP(mean): --  RR: 20 (2019 16:00) (14 - 21)  SpO2: 100% (2019 16:00) (97% - 100%)    Constitutional: wn/wd in NAD.   HEENT: NCAT, MMM, OP clear, EOMI, no proptosis or lid retraction  Neck: no thyromegaly or palpable thyroid nodules   Respiratory: lungs CTAB.  Cardiovascular: regular rhythm, normal S1 and S2, no audible murmurs, no peripheral edema  GI: soft, NT/ND, no masses/HSM appreciated.  Neurology: no tremors, DTR 2+  Skin: no visible rashes/lesions  Psychiatric: AAO x 3, normal affect/mood.    LABS:                        10.9   26.17 )-----------( 230      ( 2019 13:57 )             31.8         139  |  106  |  17  ----------------------------<  193<H>  3.6   |  21<L>  |  0.51    Ca    8.5      2019 13:57  Phos  3.9       Mg     1.5         TPro  6.0  /  Alb  3.7  /  TBili  0.4  /  DBili  x   /  AST  16  /  ALT  14  /  AlkPhos  43      PT/INR - ( 2019 13:57 )   PT: 16.9 sec;   INR: 1.48          PTT - ( 2019 13:57 )  PTT:25.3 sec  Urinalysis Basic - ( 2019 07:08 )    Color: Yellow / Appearance: Clear / S.025 / pH: x  Gluc: x / Ketone: 15 mg/dL  / Bili: Negative / Urobili: 0.2 E.U./dL   Blood: x / Protein: NEGATIVE mg/dL / Nitrite: NEGATIVE   Leuk Esterase: NEGATIVE / RBC: x / WBC x   Sq Epi: x / Non Sq Epi: x / Bacteria: x      Thyroid Stimulating Hormone, Serum: 1.177 uIU/mL ( @ 06:36)  Thyroid Stimulating Hormone, Serum: 1.218 uIU/mL ( @ 17:02)      HbA1C: 10.6 % ( 06:36)  11.0 % ( @ 17:02)    CAPILLARY BLOOD GLUCOSE      POCT Blood Glucose.: 241 mg/dL (2019 16:20)  POCT Blood Glucose.: 243 mg/dL (2019 15:24)  POCT Blood Glucose.: 202 mg/dL (2019 05:05)  POCT Blood Glucose.: 123 mg/dL (2019 21:57)  POCT Blood Glucose.: 115 mg/dL (2019 16:50)      Direct LDL: 126 mg/dL (19 @ 17:02)

## 2019-11-22 LAB
ALBUMIN SERPL ELPH-MCNC: 4.3 G/DL — SIGNIFICANT CHANGE UP (ref 3.3–5)
ALBUMIN SERPL ELPH-MCNC: 4.4 G/DL — SIGNIFICANT CHANGE UP (ref 3.3–5)
ALP SERPL-CCNC: 49 U/L — SIGNIFICANT CHANGE UP (ref 40–120)
ALP SERPL-CCNC: 49 U/L — SIGNIFICANT CHANGE UP (ref 40–120)
ALT FLD-CCNC: 16 U/L — SIGNIFICANT CHANGE UP (ref 10–45)
ALT FLD-CCNC: 17 U/L — SIGNIFICANT CHANGE UP (ref 10–45)
ANION GAP SERPL CALC-SCNC: 12 MMOL/L — SIGNIFICANT CHANGE UP (ref 5–17)
ANION GAP SERPL CALC-SCNC: 12 MMOL/L — SIGNIFICANT CHANGE UP (ref 5–17)
ANION GAP SERPL CALC-SCNC: 13 MMOL/L — SIGNIFICANT CHANGE UP (ref 5–17)
APTT BLD: 28.1 SEC — SIGNIFICANT CHANGE UP (ref 27.5–36.3)
APTT BLD: 29 SEC — SIGNIFICANT CHANGE UP (ref 27.5–36.3)
AST SERPL-CCNC: 22 U/L — SIGNIFICANT CHANGE UP (ref 10–40)
AST SERPL-CCNC: 22 U/L — SIGNIFICANT CHANGE UP (ref 10–40)
BILIRUB SERPL-MCNC: 0.6 MG/DL — SIGNIFICANT CHANGE UP (ref 0.2–1.2)
BILIRUB SERPL-MCNC: 1 MG/DL — SIGNIFICANT CHANGE UP (ref 0.2–1.2)
BUN SERPL-MCNC: 12 MG/DL — SIGNIFICANT CHANGE UP (ref 7–23)
BUN SERPL-MCNC: 12 MG/DL — SIGNIFICANT CHANGE UP (ref 7–23)
BUN SERPL-MCNC: 8 MG/DL — SIGNIFICANT CHANGE UP (ref 7–23)
CALCIUM SERPL-MCNC: 8.9 MG/DL — SIGNIFICANT CHANGE UP (ref 8.4–10.5)
CALCIUM SERPL-MCNC: 9.1 MG/DL — SIGNIFICANT CHANGE UP (ref 8.4–10.5)
CALCIUM SERPL-MCNC: 9.2 MG/DL — SIGNIFICANT CHANGE UP (ref 8.4–10.5)
CHLORIDE SERPL-SCNC: 102 MMOL/L — SIGNIFICANT CHANGE UP (ref 96–108)
CHLORIDE SERPL-SCNC: 102 MMOL/L — SIGNIFICANT CHANGE UP (ref 96–108)
CHLORIDE SERPL-SCNC: 103 MMOL/L — SIGNIFICANT CHANGE UP (ref 96–108)
CO2 SERPL-SCNC: 19 MMOL/L — LOW (ref 22–31)
CO2 SERPL-SCNC: 20 MMOL/L — LOW (ref 22–31)
CO2 SERPL-SCNC: 22 MMOL/L — SIGNIFICANT CHANGE UP (ref 22–31)
CREAT SERPL-MCNC: 0.36 MG/DL — LOW (ref 0.5–1.3)
CREAT SERPL-MCNC: 0.49 MG/DL — LOW (ref 0.5–1.3)
CREAT SERPL-MCNC: 0.54 MG/DL — SIGNIFICANT CHANGE UP (ref 0.5–1.3)
GAS PNL BLDA: SIGNIFICANT CHANGE UP
GLUCOSE BLDC GLUCOMTR-MCNC: 106 MG/DL — HIGH (ref 70–99)
GLUCOSE BLDC GLUCOMTR-MCNC: 137 MG/DL — HIGH (ref 70–99)
GLUCOSE BLDC GLUCOMTR-MCNC: 147 MG/DL — HIGH (ref 70–99)
GLUCOSE BLDC GLUCOMTR-MCNC: 181 MG/DL — HIGH (ref 70–99)
GLUCOSE BLDC GLUCOMTR-MCNC: 208 MG/DL — HIGH (ref 70–99)
GLUCOSE BLDC GLUCOMTR-MCNC: 228 MG/DL — HIGH (ref 70–99)
GLUCOSE BLDC GLUCOMTR-MCNC: 237 MG/DL — HIGH (ref 70–99)
GLUCOSE BLDC GLUCOMTR-MCNC: 239 MG/DL — HIGH (ref 70–99)
GLUCOSE BLDC GLUCOMTR-MCNC: 240 MG/DL — HIGH (ref 70–99)
GLUCOSE BLDC GLUCOMTR-MCNC: 242 MG/DL — HIGH (ref 70–99)
GLUCOSE BLDC GLUCOMTR-MCNC: 256 MG/DL — HIGH (ref 70–99)
GLUCOSE BLDC GLUCOMTR-MCNC: 269 MG/DL — HIGH (ref 70–99)
GLUCOSE BLDC GLUCOMTR-MCNC: 274 MG/DL — HIGH (ref 70–99)
GLUCOSE BLDC GLUCOMTR-MCNC: 306 MG/DL — HIGH (ref 70–99)
GLUCOSE BLDC GLUCOMTR-MCNC: 319 MG/DL — HIGH (ref 70–99)
GLUCOSE BLDC GLUCOMTR-MCNC: 87 MG/DL — SIGNIFICANT CHANGE UP (ref 70–99)
GLUCOSE BLDC GLUCOMTR-MCNC: 92 MG/DL — SIGNIFICANT CHANGE UP (ref 70–99)
GLUCOSE SERPL-MCNC: 146 MG/DL — HIGH (ref 70–99)
GLUCOSE SERPL-MCNC: 279 MG/DL — HIGH (ref 70–99)
GLUCOSE SERPL-MCNC: 335 MG/DL — HIGH (ref 70–99)
HCT VFR BLD CALC: 32.7 % — LOW (ref 34.5–45)
HCT VFR BLD CALC: 33.2 % — LOW (ref 34.5–45)
HGB BLD-MCNC: 11.2 G/DL — LOW (ref 11.5–15.5)
HGB BLD-MCNC: 11.5 G/DL — SIGNIFICANT CHANGE UP (ref 11.5–15.5)
INR BLD: 1.45 — HIGH (ref 0.88–1.16)
INR BLD: 1.56 — HIGH (ref 0.88–1.16)
LACTATE SERPL-SCNC: 1.6 MMOL/L — SIGNIFICANT CHANGE UP (ref 0.5–2)
MAGNESIUM SERPL-MCNC: 2.1 MG/DL — SIGNIFICANT CHANGE UP (ref 1.6–2.6)
MAGNESIUM SERPL-MCNC: 2.3 MG/DL — SIGNIFICANT CHANGE UP (ref 1.6–2.6)
MCHC RBC-ENTMCNC: 32.2 PG — SIGNIFICANT CHANGE UP (ref 27–34)
MCHC RBC-ENTMCNC: 32.3 PG — SIGNIFICANT CHANGE UP (ref 27–34)
MCHC RBC-ENTMCNC: 34.3 GM/DL — SIGNIFICANT CHANGE UP (ref 32–36)
MCHC RBC-ENTMCNC: 34.6 GM/DL — SIGNIFICANT CHANGE UP (ref 32–36)
MCV RBC AUTO: 93.3 FL — SIGNIFICANT CHANGE UP (ref 80–100)
MCV RBC AUTO: 94 FL — SIGNIFICANT CHANGE UP (ref 80–100)
NRBC # BLD: 0 /100 WBCS — SIGNIFICANT CHANGE UP (ref 0–0)
NRBC # BLD: 0 /100 WBCS — SIGNIFICANT CHANGE UP (ref 0–0)
PHOSPHATE SERPL-MCNC: 2.9 MG/DL — SIGNIFICANT CHANGE UP (ref 2.5–4.5)
PHOSPHATE SERPL-MCNC: 3 MG/DL — SIGNIFICANT CHANGE UP (ref 2.5–4.5)
PLATELET # BLD AUTO: 237 K/UL — SIGNIFICANT CHANGE UP (ref 150–400)
PLATELET # BLD AUTO: 238 K/UL — SIGNIFICANT CHANGE UP (ref 150–400)
POTASSIUM SERPL-MCNC: 3.5 MMOL/L — SIGNIFICANT CHANGE UP (ref 3.5–5.3)
POTASSIUM SERPL-MCNC: 4.3 MMOL/L — SIGNIFICANT CHANGE UP (ref 3.5–5.3)
POTASSIUM SERPL-MCNC: 5.8 MMOL/L — HIGH (ref 3.5–5.3)
POTASSIUM SERPL-SCNC: 3.5 MMOL/L — SIGNIFICANT CHANGE UP (ref 3.5–5.3)
POTASSIUM SERPL-SCNC: 4.3 MMOL/L — SIGNIFICANT CHANGE UP (ref 3.5–5.3)
POTASSIUM SERPL-SCNC: 5.8 MMOL/L — HIGH (ref 3.5–5.3)
PROT SERPL-MCNC: 7 G/DL — SIGNIFICANT CHANGE UP (ref 6–8.3)
PROT SERPL-MCNC: 7.5 G/DL — SIGNIFICANT CHANGE UP (ref 6–8.3)
PROTHROM AB SERPL-ACNC: 16.6 SEC — HIGH (ref 10–12.9)
PROTHROM AB SERPL-ACNC: 17.9 SEC — HIGH (ref 10–12.9)
RBC # BLD: 3.48 M/UL — LOW (ref 3.8–5.2)
RBC # BLD: 3.56 M/UL — LOW (ref 3.8–5.2)
RBC # FLD: 11.5 % — SIGNIFICANT CHANGE UP (ref 10.3–14.5)
RBC # FLD: 11.7 % — SIGNIFICANT CHANGE UP (ref 10.3–14.5)
SODIUM SERPL-SCNC: 134 MMOL/L — LOW (ref 135–145)
SODIUM SERPL-SCNC: 135 MMOL/L — SIGNIFICANT CHANGE UP (ref 135–145)
SODIUM SERPL-SCNC: 136 MMOL/L — SIGNIFICANT CHANGE UP (ref 135–145)
WBC # BLD: 20.49 K/UL — HIGH (ref 3.8–10.5)
WBC # BLD: 21.05 K/UL — HIGH (ref 3.8–10.5)
WBC # FLD AUTO: 20.49 K/UL — HIGH (ref 3.8–10.5)
WBC # FLD AUTO: 21.05 K/UL — HIGH (ref 3.8–10.5)

## 2019-11-22 PROCEDURE — 71045 X-RAY EXAM CHEST 1 VIEW: CPT | Mod: 26,77

## 2019-11-22 PROCEDURE — 71045 X-RAY EXAM CHEST 1 VIEW: CPT | Mod: 26

## 2019-11-22 PROCEDURE — 99292 CRITICAL CARE ADDL 30 MIN: CPT

## 2019-11-22 PROCEDURE — 99291 CRITICAL CARE FIRST HOUR: CPT

## 2019-11-22 RX ORDER — INSULIN LISPRO 100/ML
VIAL (ML) SUBCUTANEOUS
Refills: 0 | Status: DISCONTINUED | OUTPATIENT
Start: 2019-11-22 | End: 2019-11-25

## 2019-11-22 RX ORDER — SENNA PLUS 8.6 MG/1
2 TABLET ORAL AT BEDTIME
Refills: 0 | Status: DISCONTINUED | OUTPATIENT
Start: 2019-11-22 | End: 2019-11-25

## 2019-11-22 RX ORDER — INSULIN LISPRO 100/ML
VIAL (ML) SUBCUTANEOUS
Refills: 0 | Status: DISCONTINUED | OUTPATIENT
Start: 2019-11-22 | End: 2019-11-22

## 2019-11-22 RX ORDER — METOCLOPRAMIDE HCL 10 MG
10 TABLET ORAL ONCE
Refills: 0 | Status: COMPLETED | OUTPATIENT
Start: 2019-11-22 | End: 2019-11-22

## 2019-11-22 RX ORDER — INSULIN LISPRO 100/ML
10 VIAL (ML) SUBCUTANEOUS
Refills: 0 | Status: DISCONTINUED | OUTPATIENT
Start: 2019-11-22 | End: 2019-11-24

## 2019-11-22 RX ORDER — GLUCAGON INJECTION, SOLUTION 0.5 MG/.1ML
1 INJECTION, SOLUTION SUBCUTANEOUS ONCE
Refills: 0 | Status: DISCONTINUED | OUTPATIENT
Start: 2019-11-22 | End: 2019-11-25

## 2019-11-22 RX ORDER — SODIUM CHLORIDE 9 MG/ML
1000 INJECTION, SOLUTION INTRAVENOUS
Refills: 0 | Status: DISCONTINUED | OUTPATIENT
Start: 2019-11-22 | End: 2019-11-25

## 2019-11-22 RX ORDER — INSULIN GLARGINE 100 [IU]/ML
30 INJECTION, SOLUTION SUBCUTANEOUS AT BEDTIME
Refills: 0 | Status: DISCONTINUED | OUTPATIENT
Start: 2019-11-22 | End: 2019-11-24

## 2019-11-22 RX ORDER — METOPROLOL TARTRATE 50 MG
25 TABLET ORAL EVERY 12 HOURS
Refills: 0 | Status: DISCONTINUED | OUTPATIENT
Start: 2019-11-22 | End: 2019-11-25

## 2019-11-22 RX ORDER — DEXTROSE 50 % IN WATER 50 %
15 SYRINGE (ML) INTRAVENOUS ONCE
Refills: 0 | Status: DISCONTINUED | OUTPATIENT
Start: 2019-11-22 | End: 2019-11-25

## 2019-11-22 RX ORDER — METOPROLOL TARTRATE 50 MG
12.5 TABLET ORAL EVERY 12 HOURS
Refills: 0 | Status: DISCONTINUED | OUTPATIENT
Start: 2019-11-22 | End: 2019-11-22

## 2019-11-22 RX ORDER — KETOROLAC TROMETHAMINE 30 MG/ML
30 SYRINGE (ML) INJECTION EVERY 6 HOURS
Refills: 0 | Status: DISCONTINUED | OUTPATIENT
Start: 2019-11-22 | End: 2019-11-23

## 2019-11-22 RX ORDER — FENTANYL CITRATE 50 UG/ML
25 INJECTION INTRAVENOUS EVERY 6 HOURS
Refills: 0 | Status: DISCONTINUED | OUTPATIENT
Start: 2019-11-22 | End: 2019-11-22

## 2019-11-22 RX ORDER — CALCIUM GLUCONATE 100 MG/ML
2 VIAL (ML) INTRAVENOUS ONCE
Refills: 0 | Status: COMPLETED | OUTPATIENT
Start: 2019-11-22 | End: 2019-11-22

## 2019-11-22 RX ORDER — POTASSIUM CHLORIDE 20 MEQ
40 PACKET (EA) ORAL ONCE
Refills: 0 | Status: COMPLETED | OUTPATIENT
Start: 2019-11-22 | End: 2019-11-22

## 2019-11-22 RX ORDER — INSULIN HUMAN 100 [IU]/ML
2 INJECTION, SOLUTION SUBCUTANEOUS
Qty: 100 | Refills: 0 | Status: DISCONTINUED | OUTPATIENT
Start: 2019-11-22 | End: 2019-11-23

## 2019-11-22 RX ORDER — FENTANYL CITRATE 50 UG/ML
25 INJECTION INTRAVENOUS ONCE
Refills: 0 | Status: DISCONTINUED | OUTPATIENT
Start: 2019-11-22 | End: 2019-11-22

## 2019-11-22 RX ORDER — HUMAN INSULIN 100 [IU]/ML
15 INJECTION, SUSPENSION SUBCUTANEOUS ONCE
Refills: 0 | Status: COMPLETED | OUTPATIENT
Start: 2019-11-22 | End: 2019-11-22

## 2019-11-22 RX ORDER — INSULIN LISPRO 100/ML
5 VIAL (ML) SUBCUTANEOUS
Refills: 0 | Status: DISCONTINUED | OUTPATIENT
Start: 2019-11-22 | End: 2019-11-22

## 2019-11-22 RX ADMIN — Medication 81 MILLIGRAM(S): at 11:48

## 2019-11-22 RX ADMIN — OXYCODONE AND ACETAMINOPHEN 2 TABLET(S): 5; 325 TABLET ORAL at 15:29

## 2019-11-22 RX ADMIN — HEPARIN SODIUM 5000 UNIT(S): 5000 INJECTION INTRAVENOUS; SUBCUTANEOUS at 05:09

## 2019-11-22 RX ADMIN — Medication 2000 MILLIGRAM(S): at 21:15

## 2019-11-22 RX ADMIN — OXYCODONE AND ACETAMINOPHEN 2 TABLET(S): 5; 325 TABLET ORAL at 23:00

## 2019-11-22 RX ADMIN — Medication 2000 MILLIGRAM(S): at 06:14

## 2019-11-22 RX ADMIN — CHLORHEXIDINE GLUCONATE 5 MILLILITER(S): 213 SOLUTION TOPICAL at 04:06

## 2019-11-22 RX ADMIN — Medication 12.5 MILLIGRAM(S): at 04:45

## 2019-11-22 RX ADMIN — HEPARIN SODIUM 5000 UNIT(S): 5000 INJECTION INTRAVENOUS; SUBCUTANEOUS at 21:17

## 2019-11-22 RX ADMIN — OXYCODONE AND ACETAMINOPHEN 1 TABLET(S): 5; 325 TABLET ORAL at 05:09

## 2019-11-22 RX ADMIN — OXYCODONE AND ACETAMINOPHEN 2 TABLET(S): 5; 325 TABLET ORAL at 10:00

## 2019-11-22 RX ADMIN — SENNA PLUS 2 TABLET(S): 8.6 TABLET ORAL at 21:15

## 2019-11-22 RX ADMIN — CHLORHEXIDINE GLUCONATE 5 MILLILITER(S): 213 SOLUTION TOPICAL at 18:27

## 2019-11-22 RX ADMIN — CHLORHEXIDINE GLUCONATE 5 MILLILITER(S): 213 SOLUTION TOPICAL at 15:27

## 2019-11-22 RX ADMIN — Medication 40 MILLIEQUIVALENT(S): at 05:29

## 2019-11-22 RX ADMIN — FENTANYL CITRATE 25 MICROGRAM(S): 50 INJECTION INTRAVENOUS at 01:00

## 2019-11-22 RX ADMIN — Medication 10 MILLIGRAM(S): at 04:52

## 2019-11-22 RX ADMIN — CHLORHEXIDINE GLUCONATE 1 APPLICATION(S): 213 SOLUTION TOPICAL at 06:14

## 2019-11-22 RX ADMIN — HEPARIN SODIUM 5000 UNIT(S): 5000 INJECTION INTRAVENOUS; SUBCUTANEOUS at 14:00

## 2019-11-22 RX ADMIN — Medication 5 UNIT(S): at 07:26

## 2019-11-22 RX ADMIN — Medication 4 MG/HR: at 02:35

## 2019-11-22 RX ADMIN — FENTANYL CITRATE 25 MICROGRAM(S): 50 INJECTION INTRAVENOUS at 18:00

## 2019-11-22 RX ADMIN — FENTANYL CITRATE 25 MICROGRAM(S): 50 INJECTION INTRAVENOUS at 01:24

## 2019-11-22 RX ADMIN — FENTANYL CITRATE 25 MICROGRAM(S): 50 INJECTION INTRAVENOUS at 07:08

## 2019-11-22 RX ADMIN — OXYCODONE AND ACETAMINOPHEN 1 TABLET(S): 5; 325 TABLET ORAL at 06:12

## 2019-11-22 RX ADMIN — OXYCODONE AND ACETAMINOPHEN 2 TABLET(S): 5; 325 TABLET ORAL at 16:16

## 2019-11-22 RX ADMIN — ATORVASTATIN CALCIUM 40 MILLIGRAM(S): 80 TABLET, FILM COATED ORAL at 21:15

## 2019-11-22 RX ADMIN — Medication 25 MILLIGRAM(S): at 16:50

## 2019-11-22 RX ADMIN — Medication 4: at 07:26

## 2019-11-22 RX ADMIN — Medication 30 MILLIGRAM(S): at 20:00

## 2019-11-22 RX ADMIN — OXYCODONE AND ACETAMINOPHEN 2 TABLET(S): 5; 325 TABLET ORAL at 21:45

## 2019-11-22 RX ADMIN — Medication 2000 MILLIGRAM(S): at 14:00

## 2019-11-22 RX ADMIN — FENTANYL CITRATE 25 MICROGRAM(S): 50 INJECTION INTRAVENOUS at 13:24

## 2019-11-22 RX ADMIN — OXYCODONE AND ACETAMINOPHEN 2 TABLET(S): 5; 325 TABLET ORAL at 09:10

## 2019-11-22 RX ADMIN — FENTANYL CITRATE 25 MICROGRAM(S): 50 INJECTION INTRAVENOUS at 14:00

## 2019-11-22 RX ADMIN — PANTOPRAZOLE SODIUM 40 MILLIGRAM(S): 20 TABLET, DELAYED RELEASE ORAL at 06:16

## 2019-11-22 RX ADMIN — Medication 200 GRAM(S): at 16:50

## 2019-11-22 RX ADMIN — CHLORHEXIDINE GLUCONATE 5 MILLILITER(S): 213 SOLUTION TOPICAL at 11:34

## 2019-11-22 RX ADMIN — HUMAN INSULIN 15 UNIT(S): 100 INJECTION, SUSPENSION SUBCUTANEOUS at 04:52

## 2019-11-22 RX ADMIN — FENTANYL CITRATE 25 MICROGRAM(S): 50 INJECTION INTRAVENOUS at 17:00

## 2019-11-22 RX ADMIN — CLOPIDOGREL BISULFATE 75 MILLIGRAM(S): 75 TABLET, FILM COATED ORAL at 11:48

## 2019-11-22 RX ADMIN — FENTANYL CITRATE 25 MICROGRAM(S): 50 INJECTION INTRAVENOUS at 07:26

## 2019-11-22 RX ADMIN — CHLORHEXIDINE GLUCONATE 5 MILLILITER(S): 213 SOLUTION TOPICAL at 05:29

## 2019-11-22 RX ADMIN — Medication 30 MILLIGRAM(S): at 19:00

## 2019-11-22 NOTE — PHYSICAL THERAPY INITIAL EVALUATION ADULT - PERTINENT HX OF CURRENT PROBLEM, REHAB EVAL
33 y/o F  CURRENT SMOKER' + Marijuana use with strong FHX of CAD ( MI mom at age 40; CABG mom at age 50); PMH of  Type 2 DM, arthritis, GERD who initially presented to [facility] 11/17/19 c/o CP X 1 day.

## 2019-11-22 NOTE — PHYSICAL THERAPY INITIAL EVALUATION ADULT - GENERAL OBSERVATIONS, REHAB EVAL
Patient received seated in chair, No apparent distress, +TLC, +tele, +griffiths, +chest tube x 2, +A-line.

## 2019-11-22 NOTE — PROGRESS NOTE ADULT - SUBJECTIVE AND OBJECTIVE BOX
INTERVAL HPI/OVERNIGHT EVENTS:    Patient is a 34y old  Female who presents with a chief complaint of NSTEMI (2019 23:28)      Pt reports the following symptoms:    CONSTITUTIONAL:  Negative fever or chills, feels well, good appetite  EYES:  Negative  blurry vision or double vision  CARDIOVASCULAR:  Negative for chest pain or palpitations  RESPIRATORY:  Negative for cough, wheezing, or SOB   GASTROINTESTINAL:  Negative for nausea, vomiting, diarrhea, constipation, or abdominal pain  GENITOURINARY:  Negative frequency, urgency or dysuria  NEUROLOGIC:  No headache, confusion, dizziness, lightheadedness    MEDICATIONS  (STANDING):  aspirin enteric coated 81 milliGRAM(s) Oral daily  atorvastatin 40 milliGRAM(s) Oral at bedtime  ceFAZolin  Injectable. 2000 milliGRAM(s) IV Push every 8 hours  chlorhexidine 0.12% Liquid 5 milliLiter(s) Oral Mucosa every 4 hours  chlorhexidine 2% Cloths 1 Application(s) Topical <User Schedule>  clevidipine Infusion 2 mG/Hr (4 mL/Hr) IV Continuous <Continuous>  clopidogrel Tablet 75 milliGRAM(s) Oral daily  dextrose 5%. 1000 milliLiter(s) (50 mL/Hr) IV Continuous <Continuous>  dextrose 50% Injectable 50 milliLiter(s) IV Push every 15 minutes  dextrose 50% Injectable 25 milliLiter(s) IV Push every 15 minutes  heparin  Injectable 5000 Unit(s) SubCutaneous every 8 hours  insulin glargine Injectable (LANTUS) 30 Unit(s) SubCutaneous at bedtime  insulin lispro (HumaLOG) corrective regimen sliding scale   SubCutaneous Before meals and at bedtime  insulin lispro Injectable (HumaLOG) 10 Unit(s) SubCutaneous three times a day with meals  insulin regular Infusion 2 Unit(s)/Hr (2 mL/Hr) IV Continuous <Continuous>  metoprolol tartrate 25 milliGRAM(s) Oral every 12 hours  pantoprazole    Tablet 40 milliGRAM(s) Oral before breakfast  polyethylene glycol 3350 17 Gram(s) Oral daily  senna 2 Tablet(s) Oral at bedtime  sodium chloride 0.9%. 1000 milliLiter(s) (10 mL/Hr) IV Continuous <Continuous>    MEDICATIONS  (PRN):  acetaminophen   Tablet .. 650 milliGRAM(s) Oral every 6 hours PRN Mild Pain (1 - 3)  dextrose 40% Gel 15 Gram(s) Oral once PRN Blood Glucose LESS THAN 70 milliGRAM(s)/deciliter  glucagon  Injectable 1 milliGRAM(s) IntraMuscular once PRN Glucose LESS THAN 70 milligrams/deciliter  oxycodone    5 mG/acetaminophen 325 mG 1 Tablet(s) Oral every 6 hours PRN Moderate Pain (4 - 6)  oxycodone    5 mG/acetaminophen 325 mG 2 Tablet(s) Oral every 6 hours PRN Severe Pain (7 - 10)      Past medical history reviewed  Family history reviewed  Social history reviewed    PHYSICAL EXAM  Vital Signs Last 24 Hrs  T(C): 36.8 (2019 09:00), Max: 37.1 (2019 17:22)  T(F): 98.2 (2019 09:00), Max: 98.8 (2019 17:22)  HR: 92 (2019 14:00) (92 - 112)  BP: 152/90 (2019 17:30) (152/90 - 152/90)  BP(mean): 111 (2019 17:30) (111 - 111)  RR: 14 (2019 14:00) (14 - 37)  SpO2: 96% (2019 14:00) (92% - 100%)    Constitutional: wn/wd in NAD.   HEENT: NCAT, MMM, OP clear, EOMI, no proptosis or lid retraction  Neck: no thyromegaly or palpable thyroid nodules   Respiratory: lungs CTAB.  Cardiovascular: regular rhythm, normal S1 and S2, no audible murmurs, no peripheral edema  GI: soft, NT/ND, no masses/HSM appreciated.  Neurology: no tremors, DTR 2+  Skin: no visible rashes/lesions  Psychiatric: AAO x 3, normal affect/mood.    LABS:                        11.2   20.49 )-----------( 238      ( 2019 11:20 )             32.7         134<L>  |  102  |  12  ----------------------------<  335<H>  5.8<H>   |  20<L>  |  0.54    Ca    9.1      2019 11:20  Phos  2.9       Mg     2.3         TPro  7.5  /  Alb  4.4  /  TBili  1.0  /  DBili  x   /  AST  22  /  ALT  16  /  AlkPhos  49  11-    PT/INR - ( 2019 11:20 )   PT: 17.9 sec;   INR: 1.56          PTT - ( 2019 11:20 )  PTT:29.0 sec  Urinalysis Basic - ( 2019 07:08 )    Color: Yellow / Appearance: Clear / S.025 / pH: x  Gluc: x / Ketone: 15 mg/dL  / Bili: Negative / Urobili: 0.2 E.U./dL   Blood: x / Protein: NEGATIVE mg/dL / Nitrite: NEGATIVE   Leuk Esterase: NEGATIVE / RBC: x / WBC x   Sq Epi: x / Non Sq Epi: x / Bacteria: x      Thyroid Stimulating Hormone, Serum: 1.177 uIU/mL ( @ 06:36)  Thyroid Stimulating Hormone, Serum: 1.218 uIU/mL ( @ 17:02)      HbA1C: 10.6 % ( @ 06:36)  11.0 % ( @ 17:02)    CAPILLARY BLOOD GLUCOSE      POCT Blood Glucose.: 256 mg/dL (2019 14:13)  POCT Blood Glucose.: 306 mg/dL (2019 12:58)  POCT Blood Glucose.: 319 mg/dL (2019 11:10)  POCT Blood Glucose.: 237 mg/dL (2019 07:20)  POCT Blood Glucose.: 106 mg/dL (2019 04:24)  POCT Blood Glucose.: 137 mg/dL (2019 03:38)  POCT Blood Glucose.: 147 mg/dL (2019 02:46)  POCT Blood Glucose.: 181 mg/dL (2019 01:25)  POCT Blood Glucose.: 109 mg/dL (2019 23:30)  POCT Blood Glucose.: 164 mg/dL (2019 22:05)  POCT Blood Glucose.: 202 mg/dL (2019 21:02)  POCT Blood Glucose.: 210 mg/dL (2019 20:38)  POCT Blood Glucose.: 224 mg/dL (2019 20:10)  POCT Blood Glucose.: 231 mg/dL (2019 18:48)  POCT Blood Glucose.: 245 mg/dL (2019 17:56)  POCT Blood Glucose.: 283 mg/dL (2019 17:19)  POCT Blood Glucose.: 241 mg/dL (2019 16:20)  POCT Blood Glucose.: 243 mg/dL (2019 15:24)      Direct LDL: 126 mg/dL (19 @ 17:02) INTERVAL HPI/OVERNIGHT EVENTS:    Patient is a 34y old  Female who presents with a chief complaint of NSTEMI (2019 23:28)  Pt transitioned off insulin infusion this morning with NPH insulin, given 5 units lispro with meals  became hyperglycemic and was restarted on insulin infusion  no acute overnight events  pt OOB to chair  reports some incisional pain.       Pt reports the following symptoms:    CONSTITUTIONAL:  Negative fever or chills, feels well, good appetite  EYES:  Negative  blurry vision or double vision  CARDIOVASCULAR:  Negative for chest pain or palpitations  RESPIRATORY:  Negative for cough, wheezing, or SOB   GASTROINTESTINAL:  Negative for nausea, vomiting, diarrhea, constipation, or abdominal pain  GENITOURINARY:  Negative frequency, urgency or dysuria  NEUROLOGIC:  No headache, confusion, dizziness, lightheadedness    MEDICATIONS  (STANDING):  aspirin enteric coated 81 milliGRAM(s) Oral daily  atorvastatin 40 milliGRAM(s) Oral at bedtime  ceFAZolin  Injectable. 2000 milliGRAM(s) IV Push every 8 hours  chlorhexidine 0.12% Liquid 5 milliLiter(s) Oral Mucosa every 4 hours  chlorhexidine 2% Cloths 1 Application(s) Topical <User Schedule>  clevidipine Infusion 2 mG/Hr (4 mL/Hr) IV Continuous <Continuous>  clopidogrel Tablet 75 milliGRAM(s) Oral daily  dextrose 5%. 1000 milliLiter(s) (50 mL/Hr) IV Continuous <Continuous>  dextrose 50% Injectable 50 milliLiter(s) IV Push every 15 minutes  dextrose 50% Injectable 25 milliLiter(s) IV Push every 15 minutes  heparin  Injectable 5000 Unit(s) SubCutaneous every 8 hours  insulin glargine Injectable (LANTUS) 30 Unit(s) SubCutaneous at bedtime  insulin lispro (HumaLOG) corrective regimen sliding scale   SubCutaneous Before meals and at bedtime  insulin lispro Injectable (HumaLOG) 10 Unit(s) SubCutaneous three times a day with meals  insulin regular Infusion 2 Unit(s)/Hr (2 mL/Hr) IV Continuous <Continuous>  metoprolol tartrate 25 milliGRAM(s) Oral every 12 hours  pantoprazole    Tablet 40 milliGRAM(s) Oral before breakfast  polyethylene glycol 3350 17 Gram(s) Oral daily  senna 2 Tablet(s) Oral at bedtime  sodium chloride 0.9%. 1000 milliLiter(s) (10 mL/Hr) IV Continuous <Continuous>    MEDICATIONS  (PRN):  acetaminophen   Tablet .. 650 milliGRAM(s) Oral every 6 hours PRN Mild Pain (1 - 3)  dextrose 40% Gel 15 Gram(s) Oral once PRN Blood Glucose LESS THAN 70 milliGRAM(s)/deciliter  glucagon  Injectable 1 milliGRAM(s) IntraMuscular once PRN Glucose LESS THAN 70 milligrams/deciliter  oxycodone    5 mG/acetaminophen 325 mG 1 Tablet(s) Oral every 6 hours PRN Moderate Pain (4 - 6)  oxycodone    5 mG/acetaminophen 325 mG 2 Tablet(s) Oral every 6 hours PRN Severe Pain (7 - 10)      Past medical history reviewed  Family history reviewed  Social history reviewed    PHYSICAL EXAM  Vital Signs Last 24 Hrs  T(C): 36.8 (2019 09:00), Max: 37.1 (2019 17:22)  T(F): 98.2 (2019 09:00), Max: 98.8 (2019 17:22)  HR: 92 (2019 14:00) (92 - 112)  BP: 152/90 (2019 17:30) (152/90 - 152/90)  BP(mean): 111 (2019 17:30) (111 - 111)  RR: 14 (2019 14:00) (14 - 37)  SpO2: 96% (2019 14:00) (92% - 100%)    Constitutional: wn/wd in NAD.   HEENT: NCAT, MMM, OP clear, EOMI, no proptosis or lid retraction  Neck: no thyromegaly or palpable thyroid nodules   Respiratory: lungs CTAB.  Cardiovascular: regular rhythm, normal S1 and S2, no audible murmurs, no peripheral edema  GI: soft, NT/ND, no masses/HSM appreciated.  Neurology: no tremors, DTR 2+  Skin: no visible rashes/lesions  Psychiatric: AAO x 3, normal affect/mood.    LABS:                        11.2   20.49 )-----------( 238      ( 2019 11:20 )             32.7         134<L>  |  102  |  12  ----------------------------<  335<H>  5.8<H>   |  20<L>  |  0.54    Ca    9.1      2019 11:20  Phos  2.9       Mg     2.3         TPro  7.5  /  Alb  4.4  /  TBili  1.0  /  DBili  x   /  AST  22  /  ALT  16  /  AlkPhos  49      PT/INR - ( 2019 11:20 )   PT: 17.9 sec;   INR: 1.56          PTT - ( 2019 11:20 )  PTT:29.0 sec  Urinalysis Basic - ( 2019 07:08 )    Color: Yellow / Appearance: Clear / S.025 / pH: x  Gluc: x / Ketone: 15 mg/dL  / Bili: Negative / Urobili: 0.2 E.U./dL   Blood: x / Protein: NEGATIVE mg/dL / Nitrite: NEGATIVE   Leuk Esterase: NEGATIVE / RBC: x / WBC x   Sq Epi: x / Non Sq Epi: x / Bacteria: x      Thyroid Stimulating Hormone, Serum: 1.177 uIU/mL ( @ 06:36)  Thyroid Stimulating Hormone, Serum: 1.218 uIU/mL ( @ 17:02)      HbA1C: 10.6 % ( @ 06:36)  11.0 % ( @ 17:02)    CAPILLARY BLOOD GLUCOSE      POCT Blood Glucose.: 256 mg/dL (2019 14:13)  POCT Blood Glucose.: 306 mg/dL (2019 12:58)  POCT Blood Glucose.: 319 mg/dL (2019 11:10)  POCT Blood Glucose.: 237 mg/dL (2019 07:20)  POCT Blood Glucose.: 106 mg/dL (2019 04:24)  POCT Blood Glucose.: 137 mg/dL (2019 03:38)  POCT Blood Glucose.: 147 mg/dL (2019 02:46)  POCT Blood Glucose.: 181 mg/dL (2019 01:25)  POCT Blood Glucose.: 109 mg/dL (2019 23:30)  POCT Blood Glucose.: 164 mg/dL (2019 22:05)  POCT Blood Glucose.: 202 mg/dL (2019 21:02)  POCT Blood Glucose.: 210 mg/dL (2019 20:38)  POCT Blood Glucose.: 224 mg/dL (2019 20:10)  POCT Blood Glucose.: 231 mg/dL (2019 18:48)  POCT Blood Glucose.: 245 mg/dL (2019 17:56)  POCT Blood Glucose.: 283 mg/dL (2019 17:19)  POCT Blood Glucose.: 241 mg/dL (2019 16:20)  POCT Blood Glucose.: 243 mg/dL (2019 15:24)      Direct LDL: 126 mg/dL (19 @ 17:02)

## 2019-11-22 NOTE — PROGRESS NOTE ADULT - SUBJECTIVE AND OBJECTIVE BOX
CTICU  CRITICAL  CARE  attending     Hand off received 					   Pertinent clinical, laboratory, radiographic, hemodynamic, echocardiographic, respiratory data, microbiologic data and chart were reviewed and analyzed frequently throughout the course of the day and night    Patient seen and examined with CTS/ SH attending at bedside    33 y/o F  CURRENT SMOKER' + Marijuana use with strong FHX of CAD ( MI mom at age 40; CABG mom at age 50); PMH of  Type 2 DM, arthritis, GERD who initially presented to [place] ER 19 c/o CP X 1 day. Pt. reports that Saturday night; she had severe CP that woke her up from her sleep; pt. reports that the CP was mid-sternal; "pressure-burning" like sensation radiating to B/L UE associated with SOB; rates it as 10/10; not worse with inspiration or movement. She first thought that it might be 2/2 her GERD; took protonix and milk with no improvement in her sx which prompted her to go to the ED.  At baseline; pt. reports of having CP associated with SOB on climbing 2-3 flights of stairs which is worse with exertion and relieved with rest occuring for the last few months; She reports that her PCP was supposed to give her referral to see a cardiologist.  Pt denies dizziness, diaphoresis, fatigue, LE edema, palpitations, orthopnea, PND, syncope At [facility] ; pt ruled in NSTEMI and was admitted for further management of NSTEMI. Troponin I 0.2333> 3.370> 5.3> 8.73 >5.6; + THC in urine; Hg 13.6; negative urine test for pregnancy; EKG as per [place] paperwork NSR with no ST-T changes noted; At [place] pt was started on Hep gtt for NSTEMI; loaded with  mg PO X 1 and Plavix 300 mg PO X 1 on 19 and  started on ASA 81 ng daily; Plavix 75 mg daily; Lopressor 25 mg BID; Lisinopril 2.5 mg daily and Lipitor 80 mg daily.  Pt is now transferred to [facility] for cardiac cath  2/2 pt. risk factors, CCS class 4  sx and R/I NSTEMI.  On arrival to [facility]; Pt denies any CP, SOB, dizziness, diaphoresis, fatigue, LE edema, palpitations, orthopnea, PND, syncope; recent sick contacts/illness; URI or UTI sx. /91, HR 89, RR 16, T 97.2 O2 99 RA; EKG:  NSR @ 74 bpm; q wave inferior leads; LAD; no ST-T changes noted.      HEALTH ISSUES - PROBLEM Dx:  Prophylactic measure: Prophylactic measure  HLD (hyperlipidemia): HLD (hyperlipidemia)  Smoking: Smoking  HTN (hypertension): HTN (hypertension)  Type 2 diabetes mellitus: Type 2 diabetes mellitus  NSTEMI (non-ST elevated myocardial infarction): NSTEMI (non-ST elevated myocardial infarction)      FAMILY HISTORY:  Family history of CABG: mom (age 50s)  FH: myocardial infarction: mom: age 40s s/p PCI  FH: CAD (coronary artery disease): mom; grandmother  PAST MEDICAL & SURGICAL HISTORY:  H/O gastroesophageal reflux (GERD)  Type 2 diabetes mellitus: Diabetes  Encounter for Essure implantation  H/O hernia repair  S/P     Patient is a 34y old  Female who presented with NSTEMI   LHC: 3vCAD.     14 system review was unremarkable    Vital signs, hemodynamic and respiratory parameters were reviewed from the bedside nursing flow sheet.  ICU Vital Signs Last 24 Hrs  T(C): 37.4 (2019 20:56), Max: 37.6 (2019 17:12)  T(F): 99.3 (2019 20:56), Max: 99.7 (2019 17:12)  HR: 100 (2019 21:00) (72 - 112)  BP: 109/87 (2019 21:00) (109/87 - 144/85)  BP(mean): 82 (2019 21:00) (82 - 106)  ABP: 112/56 (2019 14:00) (110/62 - 138/64)  ABP(mean): 74 (2019 14:00) (74 - 100)  RR: 24 (2019 21:00) (11 - 37)  SpO2: 95% (2019 21:00) (91% - 100%)    Adult Advanced Hemodynamics Last 24 Hrs  CVP(mm Hg): 4 (2019 10:00) (-8 - 4)  CVP(cm H2O): --  CO: --  CI: --  PA: --  PA(mean): --  PCWP: --  SVR: --  SVRI: --  PVR: --  PVRI: --, ABG - ( 2019 14:48 )  pH, Arterial: 7.50  pH, Blood: x     /  pCO2: 24    /  pO2: 103   / HCO3: 18    / Base Excess: -4.0  /  SaO2: 98                  Intake and output was reviewed and the fluid balance was calculated  Daily     Daily   I&O's Summary    2019 07:  -  2019 07:00  --------------------------------------------------------  IN: 1499 mL / OUT: 2850 mL / NET: -1351 mL    2019 07:  -  2019 21:57  --------------------------------------------------------  IN: 901.5 mL / OUT: 680 mL / NET: 221.5 mL        All lines and drain sites were assessed    Neuro: No change in the mental status from the baseline. Moves all 4 extremities.  Neck: No JVD.  CVS: S1, S1, No S3.  Lungs: Good air entry bilaterally.   Abd: Soft. No tenderness. + Bowel sounds.  Vascular: + DP/PT.  Extremities: No edema.  Lymphatic: Normal.  Skin: No abnormalities.      labs  CBC Full  -  ( 2019 11:20 )  WBC Count : 20.49 K/uL  RBC Count : 3.48 M/uL  Hemoglobin : 11.2 g/dL  Hematocrit : 32.7 %  Platelet Count - Automated : 238 K/uL  Mean Cell Volume : 94.0 fl  Mean Cell Hemoglobin : 32.2 pg  Mean Cell Hemoglobin Concentration : 34.3 gm/dL  Auto Neutrophil # : x  Auto Lymphocyte # : x  Auto Monocyte # : x  Auto Eosinophil # : x  Auto Basophil # : x  Auto Neutrophil % : x  Auto Lymphocyte % : x  Auto Monocyte % : x  Auto Eosinophil % : x  Auto Basophil % : x        135  |  103  |  12  ----------------------------<  279<H>  4.3   |  19<L>  |  0.49<L>    Ca    8.9      2019 14:49  Phos  2.9     11  Mg     2.3         TPro  7.5  /  Alb  4.4  /  TBili  1.0  /  DBili  x   /  AST  22  /  ALT  16  /  AlkPhos  49      PT/INR - ( 2019 11:20 )   PT: 17.9 sec;   INR: 1.56          PTT - ( 2019 11:20 )  PTT:29.0 sec  The current medications were reviewed   MEDICATIONS  (STANDING):  aspirin enteric coated 81 milliGRAM(s) Oral daily  atorvastatin 40 milliGRAM(s) Oral at bedtime  ceFAZolin  Injectable. 2000 milliGRAM(s) IV Push every 8 hours  chlorhexidine 2% Cloths 1 Application(s) Topical <User Schedule>  clopidogrel Tablet 75 milliGRAM(s) Oral daily  dextrose 5%. 1000 milliLiter(s) (50 mL/Hr) IV Continuous <Continuous>  dextrose 50% Injectable 50 milliLiter(s) IV Push every 15 minutes  dextrose 50% Injectable 25 milliLiter(s) IV Push every 15 minutes  heparin  Injectable 5000 Unit(s) SubCutaneous every 8 hours  insulin glargine Injectable (LANTUS) 30 Unit(s) SubCutaneous at bedtime  insulin lispro (HumaLOG) corrective regimen sliding scale   SubCutaneous Before meals and at bedtime  insulin lispro Injectable (HumaLOG) 10 Unit(s) SubCutaneous three times a day with meals  insulin regular Infusion 2 Unit(s)/Hr (2 mL/Hr) IV Continuous <Continuous>  metoprolol tartrate 25 milliGRAM(s) Oral every 12 hours  pantoprazole    Tablet 40 milliGRAM(s) Oral before breakfast  polyethylene glycol 3350 17 Gram(s) Oral daily  senna 2 Tablet(s) Oral at bedtime  sodium chloride 0.9%. 1000 milliLiter(s) (10 mL/Hr) IV Continuous <Continuous>    MEDICATIONS  (PRN):  acetaminophen   Tablet .. 650 milliGRAM(s) Oral every 6 hours PRN Mild Pain (1 - 3)  dextrose 40% Gel 15 Gram(s) Oral once PRN Blood Glucose LESS THAN 70 milliGRAM(s)/deciliter  glucagon  Injectable 1 milliGRAM(s) IntraMuscular once PRN Glucose LESS THAN 70 milligrams/deciliter  ketorolac   Injectable 30 milliGRAM(s) IV Push every 6 hours PRN breakthrough pain  oxycodone    5 mG/acetaminophen 325 mG 1 Tablet(s) Oral every 6 hours PRN Moderate Pain (4 - 6)  oxycodone    5 mG/acetaminophen 325 mG 2 Tablet(s) Oral every 6 hours PRN Severe Pain (7 - 10)        PROBLEM LIST/ ASSESSMENT:  HEALTH ISSUES - PROBLEM Dx:  Prophylactic measure: Prophylactic measure  HLD (hyperlipidemia): HLD (hyperlipidemia)  Smoking: Smoking  HTN (hypertension): HTN (hypertension)  Type 2 diabetes mellitus: Type 2 diabetes mellitus  NSTEMI (non-ST elevated myocardial infarction): NSTEMI (non-ST elevated myocardial infarction)        Patient is a 34y old  Female who presented with NSTEMI   LHC: Triple vessel CAD.  S/P Off pump CABG X 3.  Uncontrolled DM.  Metabolic Acidosis  Hemodynamically stable.  Good oxygenation.  Fair urine out put.  Overall doing well.      My plan includes :  OPTIMIZE glucose control with IV insulin infusion next 24 - 48 hours   Statin and Betablocker.  Dual antiplatelet Rx.  Close hemodynamic, ventilatory and drain monitoring and management  Monitor for arrhythmias and monitor parameters for organ perfusion  Monitor neurologic status  Monitor renal function.  Head of the bed should remain elevated to 45 deg .   Chest PT and IS will be encouraged  Monitor  adequacy of oxygenation and ventilation and attempt to wean oxygen  Nutritional goals will be met using po eventually , ensure adequate caloric intake and monitor the same  Stress ulcer and VTE prophylaxis will be achieved    Electrolytes have been repleted as necessary and wound care has been carried out. Pain control has been achieved.   Aggressive physical therapy and early mobility and ambulation goals will be met   The family was updated about the course and plan  CRITICAL CARE TIME SPENT in evaluation and management, reassessments, review and interpretation of labs and x-rays, ventilator and hemodynamic management, formulating a plan and coordinating care: ___90____ MIN.  Time does not include procedural time.  CTICU ATTENDING     					    Alejandro Aden MD

## 2019-11-23 LAB
ALBUMIN SERPL ELPH-MCNC: 3.7 G/DL — SIGNIFICANT CHANGE UP (ref 3.3–5)
ALP SERPL-CCNC: 51 U/L — SIGNIFICANT CHANGE UP (ref 40–120)
ALT FLD-CCNC: 15 U/L — SIGNIFICANT CHANGE UP (ref 10–45)
ANION GAP SERPL CALC-SCNC: 13 MMOL/L — SIGNIFICANT CHANGE UP (ref 5–17)
APTT BLD: 28.8 SEC — SIGNIFICANT CHANGE UP (ref 27.5–36.3)
AST SERPL-CCNC: 19 U/L — SIGNIFICANT CHANGE UP (ref 10–40)
BILIRUB SERPL-MCNC: 0.8 MG/DL — SIGNIFICANT CHANGE UP (ref 0.2–1.2)
BUN SERPL-MCNC: 13 MG/DL — SIGNIFICANT CHANGE UP (ref 7–23)
CALCIUM SERPL-MCNC: 8.9 MG/DL — SIGNIFICANT CHANGE UP (ref 8.4–10.5)
CHLORIDE SERPL-SCNC: 102 MMOL/L — SIGNIFICANT CHANGE UP (ref 96–108)
CO2 SERPL-SCNC: 20 MMOL/L — LOW (ref 22–31)
CREAT SERPL-MCNC: 0.46 MG/DL — LOW (ref 0.5–1.3)
GLUCOSE BLDC GLUCOMTR-MCNC: 100 MG/DL — HIGH (ref 70–99)
GLUCOSE BLDC GLUCOMTR-MCNC: 118 MG/DL — HIGH (ref 70–99)
GLUCOSE BLDC GLUCOMTR-MCNC: 130 MG/DL — HIGH (ref 70–99)
GLUCOSE BLDC GLUCOMTR-MCNC: 131 MG/DL — HIGH (ref 70–99)
GLUCOSE BLDC GLUCOMTR-MCNC: 133 MG/DL — HIGH (ref 70–99)
GLUCOSE BLDC GLUCOMTR-MCNC: 152 MG/DL — HIGH (ref 70–99)
GLUCOSE BLDC GLUCOMTR-MCNC: 176 MG/DL — HIGH (ref 70–99)
GLUCOSE BLDC GLUCOMTR-MCNC: 199 MG/DL — HIGH (ref 70–99)
GLUCOSE BLDC GLUCOMTR-MCNC: 216 MG/DL — HIGH (ref 70–99)
GLUCOSE BLDC GLUCOMTR-MCNC: 236 MG/DL — HIGH (ref 70–99)
GLUCOSE BLDC GLUCOMTR-MCNC: 57 MG/DL — LOW (ref 70–99)
GLUCOSE BLDC GLUCOMTR-MCNC: 71 MG/DL — SIGNIFICANT CHANGE UP (ref 70–99)
GLUCOSE BLDC GLUCOMTR-MCNC: 79 MG/DL — SIGNIFICANT CHANGE UP (ref 70–99)
GLUCOSE SERPL-MCNC: 240 MG/DL — HIGH (ref 70–99)
HCT VFR BLD CALC: 29.1 % — LOW (ref 34.5–45)
HGB BLD-MCNC: 9.8 G/DL — LOW (ref 11.5–15.5)
INR BLD: 1.44 — HIGH (ref 0.88–1.16)
MAGNESIUM SERPL-MCNC: 2 MG/DL — SIGNIFICANT CHANGE UP (ref 1.6–2.6)
MCHC RBC-ENTMCNC: 32.6 PG — SIGNIFICANT CHANGE UP (ref 27–34)
MCHC RBC-ENTMCNC: 33.7 GM/DL — SIGNIFICANT CHANGE UP (ref 32–36)
MCV RBC AUTO: 96.7 FL — SIGNIFICANT CHANGE UP (ref 80–100)
NRBC # BLD: 0 /100 WBCS — SIGNIFICANT CHANGE UP (ref 0–0)
PHOSPHATE SERPL-MCNC: 3.3 MG/DL — SIGNIFICANT CHANGE UP (ref 2.5–4.5)
PLATELET # BLD AUTO: 197 K/UL — SIGNIFICANT CHANGE UP (ref 150–400)
POTASSIUM SERPL-MCNC: 4.1 MMOL/L — SIGNIFICANT CHANGE UP (ref 3.5–5.3)
POTASSIUM SERPL-SCNC: 4.1 MMOL/L — SIGNIFICANT CHANGE UP (ref 3.5–5.3)
PROT SERPL-MCNC: 6.6 G/DL — SIGNIFICANT CHANGE UP (ref 6–8.3)
PROTHROM AB SERPL-ACNC: 16.5 SEC — HIGH (ref 10–12.9)
RBC # BLD: 3.01 M/UL — LOW (ref 3.8–5.2)
RBC # FLD: 11.6 % — SIGNIFICANT CHANGE UP (ref 10.3–14.5)
SODIUM SERPL-SCNC: 135 MMOL/L — SIGNIFICANT CHANGE UP (ref 135–145)
WBC # BLD: 19.15 K/UL — HIGH (ref 3.8–10.5)
WBC # FLD AUTO: 19.15 K/UL — HIGH (ref 3.8–10.5)

## 2019-11-23 PROCEDURE — 99291 CRITICAL CARE FIRST HOUR: CPT

## 2019-11-23 PROCEDURE — 99233 SBSQ HOSP IP/OBS HIGH 50: CPT

## 2019-11-23 PROCEDURE — 71045 X-RAY EXAM CHEST 1 VIEW: CPT | Mod: 26

## 2019-11-23 RX ORDER — KETOROLAC TROMETHAMINE 30 MG/ML
15 SYRINGE (ML) INJECTION EVERY 6 HOURS
Refills: 0 | Status: DISCONTINUED | OUTPATIENT
Start: 2019-11-23 | End: 2019-11-25

## 2019-11-23 RX ORDER — METOCLOPRAMIDE HCL 10 MG
10 TABLET ORAL ONCE
Refills: 0 | Status: DISCONTINUED | OUTPATIENT
Start: 2019-11-23 | End: 2019-11-25

## 2019-11-23 RX ORDER — HUMAN INSULIN 100 [IU]/ML
16 INJECTION, SUSPENSION SUBCUTANEOUS ONCE
Refills: 0 | Status: COMPLETED | OUTPATIENT
Start: 2019-11-23 | End: 2019-11-23

## 2019-11-23 RX ADMIN — INSULIN GLARGINE 30 UNIT(S): 100 INJECTION, SOLUTION SUBCUTANEOUS at 23:00

## 2019-11-23 RX ADMIN — Medication 25 MILLIGRAM(S): at 18:00

## 2019-11-23 RX ADMIN — Medication 30 MILLIGRAM(S): at 16:00

## 2019-11-23 RX ADMIN — CLOPIDOGREL BISULFATE 75 MILLIGRAM(S): 75 TABLET, FILM COATED ORAL at 11:45

## 2019-11-23 RX ADMIN — POLYETHYLENE GLYCOL 3350 17 GRAM(S): 17 POWDER, FOR SOLUTION ORAL at 11:46

## 2019-11-23 RX ADMIN — Medication 650 MILLIGRAM(S): at 12:30

## 2019-11-23 RX ADMIN — OXYCODONE AND ACETAMINOPHEN 2 TABLET(S): 5; 325 TABLET ORAL at 19:00

## 2019-11-23 RX ADMIN — HEPARIN SODIUM 5000 UNIT(S): 5000 INJECTION INTRAVENOUS; SUBCUTANEOUS at 05:46

## 2019-11-23 RX ADMIN — HEPARIN SODIUM 5000 UNIT(S): 5000 INJECTION INTRAVENOUS; SUBCUTANEOUS at 21:54

## 2019-11-23 RX ADMIN — SENNA PLUS 2 TABLET(S): 8.6 TABLET ORAL at 21:55

## 2019-11-23 RX ADMIN — OXYCODONE AND ACETAMINOPHEN 2 TABLET(S): 5; 325 TABLET ORAL at 18:26

## 2019-11-23 RX ADMIN — Medication 4: at 16:57

## 2019-11-23 RX ADMIN — OXYCODONE AND ACETAMINOPHEN 2 TABLET(S): 5; 325 TABLET ORAL at 05:46

## 2019-11-23 RX ADMIN — Medication 650 MILLIGRAM(S): at 13:35

## 2019-11-23 RX ADMIN — Medication 30 MILLIGRAM(S): at 01:17

## 2019-11-23 RX ADMIN — OXYCODONE AND ACETAMINOPHEN 2 TABLET(S): 5; 325 TABLET ORAL at 06:30

## 2019-11-23 RX ADMIN — Medication 81 MILLIGRAM(S): at 11:46

## 2019-11-23 RX ADMIN — Medication 2000 MILLIGRAM(S): at 05:45

## 2019-11-23 RX ADMIN — HUMAN INSULIN 16 UNIT(S): 100 INJECTION, SUSPENSION SUBCUTANEOUS at 11:45

## 2019-11-23 RX ADMIN — INSULIN HUMAN 2 UNIT(S)/HR: 100 INJECTION, SOLUTION SUBCUTANEOUS at 06:11

## 2019-11-23 RX ADMIN — Medication 30 MILLIGRAM(S): at 16:30

## 2019-11-23 RX ADMIN — Medication 15 MILLIGRAM(S): at 23:15

## 2019-11-23 RX ADMIN — CHLORHEXIDINE GLUCONATE 1 APPLICATION(S): 213 SOLUTION TOPICAL at 05:47

## 2019-11-23 RX ADMIN — Medication 30 MILLIGRAM(S): at 02:00

## 2019-11-23 RX ADMIN — PANTOPRAZOLE SODIUM 40 MILLIGRAM(S): 20 TABLET, DELAYED RELEASE ORAL at 07:07

## 2019-11-23 RX ADMIN — Medication 10 UNIT(S): at 16:57

## 2019-11-23 RX ADMIN — Medication 25 MILLIGRAM(S): at 05:46

## 2019-11-23 RX ADMIN — Medication 30 MILLIGRAM(S): at 10:42

## 2019-11-23 RX ADMIN — ATORVASTATIN CALCIUM 40 MILLIGRAM(S): 80 TABLET, FILM COATED ORAL at 21:55

## 2019-11-23 RX ADMIN — HEPARIN SODIUM 5000 UNIT(S): 5000 INJECTION INTRAVENOUS; SUBCUTANEOUS at 15:54

## 2019-11-23 RX ADMIN — Medication 30 MILLIGRAM(S): at 10:00

## 2019-11-23 RX ADMIN — Medication 15 MILLIGRAM(S): at 23:31

## 2019-11-23 NOTE — PROGRESS NOTE ADULT - SUBJECTIVE AND OBJECTIVE BOX
full note to follow  continue 30 lantus 10 lispro TID wtih meals.     INTERVAL HPI/OVERNIGHT EVENTS:    Patient is a 34y old  Female who presents with a chief complaint of NSTEMI (22 Nov 2019 21:57)      Pt reports the following symptoms:    CONSTITUTIONAL:  Negative fever or chills, feels well, good appetite  EYES:  Negative  blurry vision or double vision  CARDIOVASCULAR:  Negative for chest pain or palpitations  RESPIRATORY:  Negative for cough, wheezing, or SOB   GASTROINTESTINAL:  Negative for nausea, vomiting, diarrhea, constipation, or abdominal pain  GENITOURINARY:  Negative frequency, urgency or dysuria  NEUROLOGIC:  No headache, confusion, dizziness, lightheadedness    MEDICATIONS  (STANDING):  aspirin enteric coated 81 milliGRAM(s) Oral daily  atorvastatin 40 milliGRAM(s) Oral at bedtime  chlorhexidine 2% Cloths 1 Application(s) Topical <User Schedule>  clopidogrel Tablet 75 milliGRAM(s) Oral daily  dextrose 5%. 1000 milliLiter(s) (50 mL/Hr) IV Continuous <Continuous>  dextrose 50% Injectable 50 milliLiter(s) IV Push every 15 minutes  dextrose 50% Injectable 25 milliLiter(s) IV Push every 15 minutes  heparin  Injectable 5000 Unit(s) SubCutaneous every 8 hours  insulin glargine Injectable (LANTUS) 30 Unit(s) SubCutaneous at bedtime  insulin lispro (HumaLOG) corrective regimen sliding scale   SubCutaneous Before meals and at bedtime  insulin lispro Injectable (HumaLOG) 10 Unit(s) SubCutaneous three times a day with meals  insulin regular Infusion 2 Unit(s)/Hr (2 mL/Hr) IV Continuous <Continuous>  metoclopramide Injectable 10 milliGRAM(s) IV Push once  metoprolol tartrate 25 milliGRAM(s) Oral every 12 hours  pantoprazole    Tablet 40 milliGRAM(s) Oral before breakfast  polyethylene glycol 3350 17 Gram(s) Oral daily  senna 2 Tablet(s) Oral at bedtime  sodium chloride 0.9%. 1000 milliLiter(s) (10 mL/Hr) IV Continuous <Continuous>    MEDICATIONS  (PRN):  acetaminophen   Tablet .. 650 milliGRAM(s) Oral every 6 hours PRN Mild Pain (1 - 3)  dextrose 40% Gel 15 Gram(s) Oral once PRN Blood Glucose LESS THAN 70 milliGRAM(s)/deciliter  glucagon  Injectable 1 milliGRAM(s) IntraMuscular once PRN Glucose LESS THAN 70 milligrams/deciliter  ketorolac   Injectable 30 milliGRAM(s) IV Push every 6 hours PRN breakthrough pain  oxycodone    5 mG/acetaminophen 325 mG 1 Tablet(s) Oral every 6 hours PRN Moderate Pain (4 - 6)  oxycodone    5 mG/acetaminophen 325 mG 2 Tablet(s) Oral every 6 hours PRN Severe Pain (7 - 10)      Past medical history reviewed  Family history reviewed  Social history reviewed    PHYSICAL EXAM  Vital Signs Last 24 Hrs  T(C): 36.9 (23 Nov 2019 14:00), Max: 37.6 (22 Nov 2019 17:12)  T(F): 98.4 (23 Nov 2019 14:00), Max: 99.7 (22 Nov 2019 17:12)  HR: 120 (23 Nov 2019 15:00) (76 - 120)  BP: 109/61 (23 Nov 2019 13:00) (94/64 - 138/80)  BP(mean): 74 (23 Nov 2019 13:00) (67 - 101)  RR: 43 (23 Nov 2019 15:00) (18 - 43)  SpO2: 96% (23 Nov 2019 14:00) (91% - 98%)    Constitutional: wn/wd in NAD.   HEENT: NCAT, MMM, OP clear, EOMI, no proptosis or lid retraction  Neck: no thyromegaly or palpable thyroid nodules   Respiratory: lungs CTAB.  Cardiovascular: regular rhythm, normal S1 and S2, no audible murmurs, no peripheral edema  GI: soft, NT/ND, no masses/HSM appreciated.  Neurology: no tremors, DTR 2+  Skin: no visible rashes/lesions  Psychiatric: AAO x 3, normal affect/mood.    LABS:                        9.8    19.15 )-----------( 197      ( 23 Nov 2019 02:18 )             29.1     11-23    135  |  102  |  13  ----------------------------<  240<H>  4.1   |  20<L>  |  0.46<L>    Ca    8.9      23 Nov 2019 02:18  Phos  3.3     11-23  Mg     2.0     11-23    TPro  6.6  /  Alb  3.7  /  TBili  0.8  /  DBili  x   /  AST  19  /  ALT  15  /  AlkPhos  51  11-23    PT/INR - ( 23 Nov 2019 02:18 )   PT: 16.5 sec;   INR: 1.44          PTT - ( 23 Nov 2019 02:18 )  PTT:28.8 sec    Thyroid Stimulating Hormone, Serum: 1.177 uIU/mL (11-19 @ 06:36)  Thyroid Stimulating Hormone, Serum: 1.218 uIU/mL (11-18 @ 17:02)      HbA1C: 10.6 % (11-19 @ 06:36)  11.0 % (11-18 @ 17:02)    CAPILLARY BLOOD GLUCOSE      POCT Blood Glucose.: 216 mg/dL (23 Nov 2019 16:54)  POCT Blood Glucose.: 118 mg/dL (23 Nov 2019 11:21)  POCT Blood Glucose.: 133 mg/dL (23 Nov 2019 10:24)  POCT Blood Glucose.: 176 mg/dL (23 Nov 2019 07:39)  POCT Blood Glucose.: 130 mg/dL (23 Nov 2019 05:19)  POCT Blood Glucose.: 152 mg/dL (23 Nov 2019 04:16)  POCT Blood Glucose.: 199 mg/dL (23 Nov 2019 03:06)  POCT Blood Glucose.: 236 mg/dL (23 Nov 2019 02:06)  POCT Blood Glucose.: 100 mg/dL (23 Nov 2019 00:39)  POCT Blood Glucose.: 71 mg/dL (23 Nov 2019 00:22)  POCT Blood Glucose.: 57 mg/dL (23 Nov 2019 00:06)  POCT Blood Glucose.: 87 mg/dL (22 Nov 2019 23:23)  POCT Blood Glucose.: 92 mg/dL (22 Nov 2019 23:21)  POCT Blood Glucose.: 242 mg/dL (22 Nov 2019 21:34)  POCT Blood Glucose.: 274 mg/dL (22 Nov 2019 20:10)  POCT Blood Glucose.: 228 mg/dL (22 Nov 2019 19:12)  POCT Blood Glucose.: 239 mg/dL (22 Nov 2019 18:05)  POCT Blood Glucose.: 208 mg/dL (22 Nov 2019 17:12)      Direct LDL: 126 mg/dL (11-18-19 @ 17:02) INTERVAL HPI/OVERNIGHT EVENTS:    Patient is a 34y old  Female who presents with a chief complaint of NSTEMI (22 Nov 2019 21:57)  POD #2  Pt maintained on insulin infusion overnight  transitioned off this morning with NPH insulin  reports eating well  no acute events  out of bed and walking.   no BM    Pt reports the following symptoms:    CONSTITUTIONAL:  Negative fever or chills, feels well, good appetite  EYES:  Negative  blurry vision or double vision  CARDIOVASCULAR:  Negative for chest pain or palpitations  RESPIRATORY:  Negative for cough, wheezing, or SOB   GASTROINTESTINAL:  Negative for nausea, vomiting, diarrhea, constipation, or abdominal pain  GENITOURINARY:  Negative frequency, urgency or dysuria  NEUROLOGIC:  No headache, confusion, dizziness, lightheadedness    MEDICATIONS  (STANDING):  aspirin enteric coated 81 milliGRAM(s) Oral daily  atorvastatin 40 milliGRAM(s) Oral at bedtime  chlorhexidine 2% Cloths 1 Application(s) Topical <User Schedule>  clopidogrel Tablet 75 milliGRAM(s) Oral daily  dextrose 5%. 1000 milliLiter(s) (50 mL/Hr) IV Continuous <Continuous>  dextrose 50% Injectable 50 milliLiter(s) IV Push every 15 minutes  dextrose 50% Injectable 25 milliLiter(s) IV Push every 15 minutes  heparin  Injectable 5000 Unit(s) SubCutaneous every 8 hours  insulin glargine Injectable (LANTUS) 30 Unit(s) SubCutaneous at bedtime  insulin lispro (HumaLOG) corrective regimen sliding scale   SubCutaneous Before meals and at bedtime  insulin lispro Injectable (HumaLOG) 10 Unit(s) SubCutaneous three times a day with meals  insulin regular Infusion 2 Unit(s)/Hr (2 mL/Hr) IV Continuous <Continuous>  metoclopramide Injectable 10 milliGRAM(s) IV Push once  metoprolol tartrate 25 milliGRAM(s) Oral every 12 hours  pantoprazole    Tablet 40 milliGRAM(s) Oral before breakfast  polyethylene glycol 3350 17 Gram(s) Oral daily  senna 2 Tablet(s) Oral at bedtime  sodium chloride 0.9%. 1000 milliLiter(s) (10 mL/Hr) IV Continuous <Continuous>    MEDICATIONS  (PRN):  acetaminophen   Tablet .. 650 milliGRAM(s) Oral every 6 hours PRN Mild Pain (1 - 3)  dextrose 40% Gel 15 Gram(s) Oral once PRN Blood Glucose LESS THAN 70 milliGRAM(s)/deciliter  glucagon  Injectable 1 milliGRAM(s) IntraMuscular once PRN Glucose LESS THAN 70 milligrams/deciliter  ketorolac   Injectable 30 milliGRAM(s) IV Push every 6 hours PRN breakthrough pain  oxycodone    5 mG/acetaminophen 325 mG 1 Tablet(s) Oral every 6 hours PRN Moderate Pain (4 - 6)  oxycodone    5 mG/acetaminophen 325 mG 2 Tablet(s) Oral every 6 hours PRN Severe Pain (7 - 10)      Past medical history reviewed  Family history reviewed  Social history reviewed    PHYSICAL EXAM  Vital Signs Last 24 Hrs  T(C): 36.9 (23 Nov 2019 14:00), Max: 37.6 (22 Nov 2019 17:12)  T(F): 98.4 (23 Nov 2019 14:00), Max: 99.7 (22 Nov 2019 17:12)  HR: 120 (23 Nov 2019 15:00) (76 - 120)  BP: 109/61 (23 Nov 2019 13:00) (94/64 - 138/80)  BP(mean): 74 (23 Nov 2019 13:00) (67 - 101)  RR: 43 (23 Nov 2019 15:00) (18 - 43)  SpO2: 96% (23 Nov 2019 14:00) (91% - 98%)    Constitutional: wn/wd in NAD.   HEENT: NCAT, MMM, OP clear, EOMI, no proptosis or lid retraction  Neck: no thyromegaly or palpable thyroid nodules   Respiratory: lungs CTAB. + chest tube  Cardiovascular: regular rhythm, normal S1 and S2, no audible murmurs, no peripheral edema  GI: soft, NT/ND, no masses/HSM appreciated.  Neurology: no tremors, DTR 2+  Skin: no visible rashes/lesions  Psychiatric: AAO x 3, normal affect/mood.    LABS:                        9.8    19.15 )-----------( 197      ( 23 Nov 2019 02:18 )             29.1     11-23    135  |  102  |  13  ----------------------------<  240<H>  4.1   |  20<L>  |  0.46<L>    Ca    8.9      23 Nov 2019 02:18  Phos  3.3     11-23  Mg     2.0     11-23    TPro  6.6  /  Alb  3.7  /  TBili  0.8  /  DBili  x   /  AST  19  /  ALT  15  /  AlkPhos  51  11-23    PT/INR - ( 23 Nov 2019 02:18 )   PT: 16.5 sec;   INR: 1.44          PTT - ( 23 Nov 2019 02:18 )  PTT:28.8 sec    Thyroid Stimulating Hormone, Serum: 1.177 uIU/mL (11-19 @ 06:36)  Thyroid Stimulating Hormone, Serum: 1.218 uIU/mL (11-18 @ 17:02)      HbA1C: 10.6 % (11-19 @ 06:36)  11.0 % (11-18 @ 17:02)    CAPILLARY BLOOD GLUCOSE      POCT Blood Glucose.: 216 mg/dL (23 Nov 2019 16:54)  POCT Blood Glucose.: 118 mg/dL (23 Nov 2019 11:21)  POCT Blood Glucose.: 133 mg/dL (23 Nov 2019 10:24)  POCT Blood Glucose.: 176 mg/dL (23 Nov 2019 07:39)  POCT Blood Glucose.: 130 mg/dL (23 Nov 2019 05:19)  POCT Blood Glucose.: 152 mg/dL (23 Nov 2019 04:16)  POCT Blood Glucose.: 199 mg/dL (23 Nov 2019 03:06)  POCT Blood Glucose.: 236 mg/dL (23 Nov 2019 02:06)  POCT Blood Glucose.: 100 mg/dL (23 Nov 2019 00:39)  POCT Blood Glucose.: 71 mg/dL (23 Nov 2019 00:22)  POCT Blood Glucose.: 57 mg/dL (23 Nov 2019 00:06)  POCT Blood Glucose.: 87 mg/dL (22 Nov 2019 23:23)  POCT Blood Glucose.: 92 mg/dL (22 Nov 2019 23:21)  POCT Blood Glucose.: 242 mg/dL (22 Nov 2019 21:34)  POCT Blood Glucose.: 274 mg/dL (22 Nov 2019 20:10)  POCT Blood Glucose.: 228 mg/dL (22 Nov 2019 19:12)  POCT Blood Glucose.: 239 mg/dL (22 Nov 2019 18:05)  POCT Blood Glucose.: 208 mg/dL (22 Nov 2019 17:12)      Direct LDL: 126 mg/dL (11-18-19 @ 17:02)

## 2019-11-23 NOTE — PROGRESS NOTE ADULT - SUBJECTIVE AND OBJECTIVE BOX
CTICU  CRITICAL  CARE  attending     Hand off received 					   Pertinent clinical, laboratory, radiographic, hemodynamic, echocardiographic, respiratory data, microbiologic data and chart were reviewed and analyzed frequently throughout the course of the day and night  Patient seen and examined with CTS/ SH attending at bedside  Pt is a 34y , Female, HEALTH ISSUES - PROBLEM Dx:  Prophylactic measure: Prophylactic measure  HLD (hyperlipidemia): HLD (hyperlipidemia)  Smoking: Smoking  HTN (hypertension): HTN (hypertension)  Type 2 diabetes mellitus: Type 2 diabetes mellitus  NSTEMI (non-ST elevated myocardial infarction): NSTEMI (non-ST elevated myocardial infarction)      , FAMILY HISTORY:  Family history of CABG: mom (age 50s)  FH: myocardial infarction: mom: age 40s s/p PCI  FH: CAD (coronary artery disease): mom; grandmother  PAST MEDICAL & SURGICAL HISTORY:  H/O gastroesophageal reflux (GERD)  Type 2 diabetes mellitus: Diabetes  Encounter for Essure implantation  H/O hernia repair  S/P     Patient is a 34y old  Female who presents with a chief complaint of NSTEMI (2019 17:02)      14 system review was unremarkable    Vital signs, hemodynamic and respiratory parameters were reviewed from the bedside nursing flowsheet.  ICU Vital Signs Last 24 Hrs  T(C): 37.1 (2019 17:00), Max: 37.4 (2019 20:56)  T(F): 98.7 (2019 17:00), Max: 99.3 (2019 20:56)  HR: 90 (2019 20:00) (86 - 122)  BP: 106/64 (2019 20:00) (94/64 - 122/69)  BP(mean): 79 (2019 20:00) (67 - 101)  ABP: --  ABP(mean): --  RR: 20 (2019 20:00) (18 - 36)  SpO2: 97% (2019 19:00) (93% - 97%)    Adult Advanced Hemodynamics Last 24 Hrs  CVP(mm Hg): --  CVP(cm H2O): --  CO: --  CI: --  PA: --  PA(mean): --  PCWP: --  SVR: --  SVRI: --  PVR: --  PVRI: --, ABG - ( 2019 14:48 )  pH, Arterial: 7.50  pH, Blood: x     /  pCO2: 24    /  pO2: 103   / HCO3: 18    / Base Excess: -4.0  /  SaO2: 98                  Intake and output was reviewed and the fluid balance was calculated  Daily     Daily   I&O's Summary    2019 07:  -  2019 07:00  --------------------------------------------------------  IN: 1501 mL / OUT: 1180 mL / NET: 321 mL    2019 07:  -  2019 20:53  --------------------------------------------------------  IN: 716 mL / OUT: 310 mL / NET: 406 mL        All lines and drain sites were assessed  [facility]      POCT Blood Glucose.: 216 mg/dL (2019 16:54)    No acute change in mental status  Auscultation of the chest reveals equal bs  Abdomen is soft  Extremities are warm and well perfused  Wounds appear clean and unremarkable  Antibiotics are periop    labs  CBC Full  -  ( 2019 02:18 )  WBC Count : 19.15 K/uL  RBC Count : 3.01 M/uL  Hemoglobin : 9.8 g/dL  Hematocrit : 29.1 %  Platelet Count - Automated : 197 K/uL  Mean Cell Volume : 96.7 fl  Mean Cell Hemoglobin : 32.6 pg  Mean Cell Hemoglobin Concentration : 33.7 gm/dL  Auto Neutrophil # : x  Auto Lymphocyte # : x  Auto Monocyte # : x  Auto Eosinophil # : x  Auto Basophil # : x  Auto Neutrophil % : x  Auto Lymphocyte % : x  Auto Monocyte % : x  Auto Eosinophil % : x  Auto Basophil % : x        135  |  102  |  13  ----------------------------<  240<H>  4.1   |  20<L>  |  0.46<L>    Ca    8.9      2019 02:18  Phos  3.3       Mg     2.0         TPro  6.6  /  Alb  3.7  /  TBili  0.8  /  DBili  x   /  AST  19  /  ALT  15  /  AlkPhos  51  11-23    PT/INR - ( 2019 02:18 )   PT: 16.5 sec;   INR: 1.44          PTT - ( 2019 02:18 )  PTT:28.8 sec  The current medications were reviewed   MEDICATIONS  (STANDING):  aspirin enteric coated 81 milliGRAM(s) Oral daily  atorvastatin 40 milliGRAM(s) Oral at bedtime  chlorhexidine 2% Cloths 1 Application(s) Topical <User Schedule>  clopidogrel Tablet 75 milliGRAM(s) Oral daily  dextrose 5%. 1000 milliLiter(s) (50 mL/Hr) IV Continuous <Continuous>  dextrose 50% Injectable 50 milliLiter(s) IV Push every 15 minutes  dextrose 50% Injectable 25 milliLiter(s) IV Push every 15 minutes  heparin  Injectable 5000 Unit(s) SubCutaneous every 8 hours  insulin glargine Injectable (LANTUS) 30 Unit(s) SubCutaneous at bedtime  insulin lispro (HumaLOG) corrective regimen sliding scale   SubCutaneous Before meals and at bedtime  insulin lispro Injectable (HumaLOG) 10 Unit(s) SubCutaneous three times a day with meals  metoclopramide Injectable 10 milliGRAM(s) IV Push once  metoprolol tartrate 25 milliGRAM(s) Oral every 12 hours  pantoprazole    Tablet 40 milliGRAM(s) Oral before breakfast  polyethylene glycol 3350 17 Gram(s) Oral daily  senna 2 Tablet(s) Oral at bedtime  sodium chloride 0.9%. 1000 milliLiter(s) (10 mL/Hr) IV Continuous <Continuous>    MEDICATIONS  (PRN):  acetaminophen   Tablet .. 650 milliGRAM(s) Oral every 6 hours PRN Mild Pain (1 - 3)  dextrose 40% Gel 15 Gram(s) Oral once PRN Blood Glucose LESS THAN 70 milliGRAM(s)/deciliter  glucagon  Injectable 1 milliGRAM(s) IntraMuscular once PRN Glucose LESS THAN 70 milligrams/deciliter  oxycodone    5 mG/acetaminophen 325 mG 1 Tablet(s) Oral every 6 hours PRN Moderate Pain (4 - 6)  oxycodone    5 mG/acetaminophen 325 mG 2 Tablet(s) Oral every 6 hours PRN Severe Pain (7 - 10)       PROBLEM LIST/ ASSESSMENT:  HEALTH ISSUES - PROBLEM Dx:  Prophylactic measure: Prophylactic measure  HLD (hyperlipidemia): HLD (hyperlipidemia)  Smoking: Smoking  HTN (hypertension): HTN (hypertension)  Type 2 diabetes mellitus: Type 2 diabetes mellitus  NSTEMI (non-ST elevated myocardial infarction): NSTEMI (non-ST elevated myocardial infarction)      ,   Patient is a 34y old  Female who presents with a chief complaint of NSTEMI (2019 17:02)     s/p cardiac surgery              My plan includes :  close hemodynamic, ventilatory and drain monitoring and management per post op routine    Monitor for arrhythmias and monitor parameters for organ perfusion  beta blockade not administered due to hemodynamic instability and bradycardia  monitor neurologic status  Head of the bed should remain elevated to 45 deg .   chest PT and IS will be encouraged  monitor adequacy of oxygenation and ventilation and attempt to wean oxygen  antibiotic regimen will be tailored to the clinical, laboratory and microbiologic data  Nutritional goals will be met using po eventually , ensure adequate caloric intake and montior the same  Stress ulcer and VTE prophylaxis will be achieved    Glycemic control is satisfactory  Electrolytes have been repleted as necessary and wound care has been carried out. Pain control has been achieved.   agressive physical therapy and early mobility and ambulation goals will be met   The family was updated about the course and plan  CRITICAL CARE TIME personally provided by me  in evaluation and management, reassessments, review and interpretation of labs and x-rays, ventilator and hemodynamic management, formulating a plan and coordinating care: ___90____ MIN.  Time does not include procedural time.  CTICU ATTENDING     					    Dmitry Joy MD

## 2019-11-23 NOTE — PROGRESS NOTE ADULT - SUBJECTIVE AND OBJECTIVE BOX
CTICU  CRITICAL  CARE  attending     Hand off received 					   Pertinent clinical, laboratory, radiographic, hemodynamic, echocardiographic, respiratory data, microbiologic data and chart were reviewed and analyzed frequently throughout the course of the day and night    Patient seen and examined with CTS/ SH attending at bedside  35 y/o F  CURRENT SMOKER' + Marijuana use with strong FHX of CAD ( MI mom at age 40; CABG mom at age 50); PMH of  Type 2 DM, arthritis, GERD who initially presented to [facility] 19 c/o CP X 1 day. Pt. reports that Saturday night; she had severe CP that woke her up from her sleep; pt. reports that the CP was mid-sternal; "pressure-burning" like sensation radiating to B/L UE associated with SOB; rates it as 10/10; not worse with inspiration or movement. She first thought that it might be 2/2 her GERD; took protonix and milk with no improvement in her sx which prompted her to go to the ED.  At baseline; pt. reports of having CP associated with SOB on climbing 2-3 flights of stairs which is worse with exertion and relieved with rest occuring for the last few months; She reports that her PCP was supposed to give her referral to see a cardiologist.  Pt denies dizziness, diaphoresis, fatigue, LE edema, palpitations, orthopnea, PND, syncope At [facility] ; pt ruled in NSTEMI and was admitted for further management of NSTEMI. Troponin I 0.2333> 3.370> 5.3> 8.73 >5.6; + THC in urine; Hg 13.6; negative urine test for pregnancy; EKG as per [facility] paperwork NSR with no ST-T changes noted; At [facility] pt was started on Hep gtt for NSTEMI; loaded with  mg PO X 1 and Plavix 300 mg PO X 1 on 19 and  started on ASA 81 ng daily; Plavix 75 mg daily; Lopressor 25 mg BID; Lisinopril 2.5 mg daily and Lipitor 80 mg daily.  Pt is now transferred to [facility] for cardiac cath  2/2 pt. risk factors, CCS class 4  sx and R/I NSTEMI.  On arrival to [facility]; Pt denies any CP, SOB, dizziness, diaphoresis, fatigue, LE edema, palpitations, orthopnea, PND, syncope; recent sick contacts/illness; URI or UTI sx. /91, HR 89, RR 16, T 97.2 O2 99 RA; EKG:  NSR @ 74 bpm; q wave inferior leads; LAD; no ST-T changes noted.  S/P CABG      HEALTH ISSUES - PROBLEM Dx:  Prophylactic measure: Prophylactic measure  HLD (hyperlipidemia): HLD (hyperlipidemia)  Smoking: Smoking  HTN (hypertension): HTN (hypertension)  Type 2 diabetes mellitus: Type 2 diabetes mellitus  NSTEMI (non-ST elevated myocardial infarction): NSTEMI (non-ST elevated myocardial infarction)      FAMILY HISTORY:  Family history of CABG: mom (age 50s)  FH: myocardial infarction: mom: age 40s s/p PCI  FH: CAD (coronary artery disease): mom; grandmother  PAST MEDICAL & SURGICAL HISTORY:  H/O gastroesophageal reflux (GERD)  Type 2 diabetes mellitus: Diabetes  Encounter for Essure implantation  H/O hernia repair  S/P     Patient is a 34y old  Female who presents with a chief complaint of NSTEMI (2019 20:53)    14 system review was unremarkable    Vital signs, hemodynamic and respiratory parameters were reviewed from the bedside nursing flow sheet.  ICU Vital Signs Last 24 Hrs  T(C): 37.2 (2019 22:17), Max: 37.2 (2019 22:17)  T(F): 99 (2019 22:17), Max: 99 (2019 22:17)  HR: 96 (2019 22:00) (86 - 122)  BP: 97/66 (2019 22:00) (94/64 - 122/69)  BP(mean): 78 (2019 22:00) (67 - 101)  ABP: --  ABP(mean): --  RR: 18 (2019 22:00) (18 - 36)  SpO2: 99% (2019 22:00) (93% - 100%)    Adult Advanced Hemodynamics Last 24 Hrs  CVP(mm Hg): --  CVP(cm H2O): --  CO: --  CI: --  PA: --  PA(mean): --  PCWP: --  SVR: --  SVRI: --  PVR: --  PVRI: --, ABG - ( 2019 14:48 )  pH, Arterial: 7.50  pH, Blood: x     /  pCO2: 24    /  pO2: 103   / HCO3: 18    / Base Excess: -4.0  /  SaO2: 98                  Intake and output was reviewed and the fluid balance was calculated  Daily     Daily   I&O's Summary    2019 07:  -  2019 07:00  --------------------------------------------------------  IN: 1501 mL / OUT: 1180 mL / NET: 321 mL    2019 07:  -  2019 22:31  --------------------------------------------------------  IN: 716 mL / OUT: 310 mL / NET: 406 mL            Neuro: No focal motor deficit.  Neck: No JVD.  CVS: S1, S1, No S3.  Lungs: Good air entry bilaterally.   Abd: Soft. No tenderness. + Bowel sounds.  Vascular: + DP/PT.  Extremities: No edema.  Lymphatic: Normal.  Skin: No abnormalities.      labs  CBC Full  -  ( 2019 02:18 )  WBC Count : 19.15 K/uL  RBC Count : 3.01 M/uL  Hemoglobin : 9.8 g/dL  Hematocrit : 29.1 %  Platelet Count - Automated : 197 K/uL  Mean Cell Volume : 96.7 fl  Mean Cell Hemoglobin : 32.6 pg  Mean Cell Hemoglobin Concentration : 33.7 gm/dL  Auto Neutrophil # : x  Auto Lymphocyte # : x  Auto Monocyte # : x  Auto Eosinophil # : x  Auto Basophil # : x  Auto Neutrophil % : x  Auto Lymphocyte % : x  Auto Monocyte % : x  Auto Eosinophil % : x  Auto Basophil % : x        135  |  102  |  13  ----------------------------<  240<H>  4.1   |  20<L>  |  0.46<L>    Ca    8.9      2019 02:18  Phos  3.3       Mg     2.0         TPro  6.6  /  Alb  3.7  /  TBili  0.8  /  DBili  x   /  AST  19  /  ALT  15  /  AlkPhos  51  11-23    PT/INR - ( 2019 02:18 )   PT: 16.5 sec;   INR: 1.44          PTT - ( 2019 02:18 )  PTT:28.8 sec  The current medications were reviewed   MEDICATIONS  (STANDING):  aspirin enteric coated 81 milliGRAM(s) Oral daily  atorvastatin 40 milliGRAM(s) Oral at bedtime  chlorhexidine 2% Cloths 1 Application(s) Topical <User Schedule>  clopidogrel Tablet 75 milliGRAM(s) Oral daily  dextrose 5%. 1000 milliLiter(s) (50 mL/Hr) IV Continuous <Continuous>  dextrose 50% Injectable 50 milliLiter(s) IV Push every 15 minutes  dextrose 50% Injectable 25 milliLiter(s) IV Push every 15 minutes  heparin  Injectable 5000 Unit(s) SubCutaneous every 8 hours  insulin glargine Injectable (LANTUS) 30 Unit(s) SubCutaneous at bedtime  insulin lispro (HumaLOG) corrective regimen sliding scale   SubCutaneous Before meals and at bedtime  insulin lispro Injectable (HumaLOG) 10 Unit(s) SubCutaneous three times a day with meals  metoclopramide Injectable 10 milliGRAM(s) IV Push once  metoprolol tartrate 25 milliGRAM(s) Oral every 12 hours  pantoprazole    Tablet 40 milliGRAM(s) Oral before breakfast  polyethylene glycol 3350 17 Gram(s) Oral daily  senna 2 Tablet(s) Oral at bedtime  sodium chloride 0.9%. 1000 milliLiter(s) (10 mL/Hr) IV Continuous <Continuous>    MEDICATIONS  (PRN):  acetaminophen   Tablet .. 650 milliGRAM(s) Oral every 6 hours PRN Mild Pain (1 - 3)  dextrose 40% Gel 15 Gram(s) Oral once PRN Blood Glucose LESS THAN 70 milliGRAM(s)/deciliter  glucagon  Injectable 1 milliGRAM(s) IntraMuscular once PRN Glucose LESS THAN 70 milligrams/deciliter  oxycodone    5 mG/acetaminophen 325 mG 1 Tablet(s) Oral every 6 hours PRN Moderate Pain (4 - 6)  oxycodone    5 mG/acetaminophen 325 mG 2 Tablet(s) Oral every 6 hours PRN Severe Pain (7 - 10)        PROBLEM LIST/ ASSESSMENT:  HEALTH ISSUES - PROBLEM Dx:  Prophylactic measure: Prophylactic measure  HLD (hyperlipidemia): HLD (hyperlipidemia)  Smoking: Smoking  HTN (hypertension): HTN (hypertension)  Type 2 diabetes mellitus: Type 2 diabetes mellitus  NSTEMI (non-ST elevated myocardial infarction): NSTEMI (non-ST elevated myocardial infarction)      Patient is a 34y old  Female who presented with NSTEMI   LHC: Triple vessel CAD.  S/P Off pump CABG X 3.  Uncontrolled DM.  Metabolic Acidosis  Hemodynamically stable.  Good oxygenation.  Fair urine out put.  Overall doing well.  Insulin drip has been off. Low on HS lantus and lispro with meals.      My plan includes :  Statin and Betablocker.  Dual antiplatelet Rx.  Close hemodynamic, ventilatory and drain monitoring and management  Monitor for arrhythmias and monitor parameters for organ perfusion  Monitor neurologic status  Monitor renal function.  All lines out.  Head of the bed should remain elevated to 45 deg .   Chest PT and IS will be encouraged  Monitor adequacy of oxygenation and ventilation and attempt to wean oxygen  Nutritional goals will be met using po eventually , ensure adequate caloric intake and monitor the same  Stress ulcer and VTE prophylaxis will be achieved    OPTIMIZE Glycemic control.  Electrolytes have been repleted as necessary and wound care has been carried out. Pain control has been achieved.   Aggressive physical therapy and early mobility and ambulation goals will be met   The family was updated about the course and plan  CRITICAL CARE TIME SPENT in evaluation and management, reassessments, review and interpretation of labs and x-rays, ventilator and hemodynamic management, formulating a plan and coordinating care: ___30____ MIN.  Time does not include procedural time.  CTICU ATTENDING     					    Alejandro Aden MD

## 2019-11-24 LAB
17OHP SERPL-MCNC: 129 NG/DL — SIGNIFICANT CHANGE UP
ALBUMIN SERPL ELPH-MCNC: 3.6 G/DL — SIGNIFICANT CHANGE UP (ref 3.3–5)
ALP SERPL-CCNC: 51 U/L — SIGNIFICANT CHANGE UP (ref 40–120)
ALT FLD-CCNC: 13 U/L — SIGNIFICANT CHANGE UP (ref 10–45)
ANION GAP SERPL CALC-SCNC: 9 MMOL/L — SIGNIFICANT CHANGE UP (ref 5–17)
APTT BLD: 29.6 SEC — SIGNIFICANT CHANGE UP (ref 27.5–36.3)
AST SERPL-CCNC: 19 U/L — SIGNIFICANT CHANGE UP (ref 10–40)
BILIRUB SERPL-MCNC: 1 MG/DL — SIGNIFICANT CHANGE UP (ref 0.2–1.2)
BUN SERPL-MCNC: 17 MG/DL — SIGNIFICANT CHANGE UP (ref 7–23)
CALCIUM SERPL-MCNC: 8.6 MG/DL — SIGNIFICANT CHANGE UP (ref 8.4–10.5)
CHLORIDE SERPL-SCNC: 103 MMOL/L — SIGNIFICANT CHANGE UP (ref 96–108)
CO2 SERPL-SCNC: 24 MMOL/L — SIGNIFICANT CHANGE UP (ref 22–31)
CREAT SERPL-MCNC: 0.48 MG/DL — LOW (ref 0.5–1.3)
GLUCOSE BLDC GLUCOMTR-MCNC: 138 MG/DL — HIGH (ref 70–99)
GLUCOSE BLDC GLUCOMTR-MCNC: 153 MG/DL — HIGH (ref 70–99)
GLUCOSE BLDC GLUCOMTR-MCNC: 218 MG/DL — HIGH (ref 70–99)
GLUCOSE BLDC GLUCOMTR-MCNC: 231 MG/DL — HIGH (ref 70–99)
GLUCOSE BLDC GLUCOMTR-MCNC: 246 MG/DL — HIGH (ref 70–99)
GLUCOSE BLDC GLUCOMTR-MCNC: 251 MG/DL — HIGH (ref 70–99)
GLUCOSE SERPL-MCNC: 137 MG/DL — HIGH (ref 70–99)
HCT VFR BLD CALC: 29.5 % — LOW (ref 34.5–45)
HGB BLD-MCNC: 9.7 G/DL — LOW (ref 11.5–15.5)
INR BLD: 1.27 — HIGH (ref 0.88–1.16)
MAGNESIUM SERPL-MCNC: 2 MG/DL — SIGNIFICANT CHANGE UP (ref 1.6–2.6)
MCHC RBC-ENTMCNC: 32.2 PG — SIGNIFICANT CHANGE UP (ref 27–34)
MCHC RBC-ENTMCNC: 32.9 GM/DL — SIGNIFICANT CHANGE UP (ref 32–36)
MCV RBC AUTO: 98 FL — SIGNIFICANT CHANGE UP (ref 80–100)
NRBC # BLD: 0 /100 WBCS — SIGNIFICANT CHANGE UP (ref 0–0)
PHOSPHATE SERPL-MCNC: 2.6 MG/DL — SIGNIFICANT CHANGE UP (ref 2.5–4.5)
PLATELET # BLD AUTO: 214 K/UL — SIGNIFICANT CHANGE UP (ref 150–400)
POTASSIUM SERPL-MCNC: 4 MMOL/L — SIGNIFICANT CHANGE UP (ref 3.5–5.3)
POTASSIUM SERPL-SCNC: 4 MMOL/L — SIGNIFICANT CHANGE UP (ref 3.5–5.3)
PROT SERPL-MCNC: 6.5 G/DL — SIGNIFICANT CHANGE UP (ref 6–8.3)
PROTHROM AB SERPL-ACNC: 14.5 SEC — HIGH (ref 10–12.9)
RBC # BLD: 3.01 M/UL — LOW (ref 3.8–5.2)
RBC # FLD: 11.5 % — SIGNIFICANT CHANGE UP (ref 10.3–14.5)
SODIUM SERPL-SCNC: 136 MMOL/L — SIGNIFICANT CHANGE UP (ref 135–145)
TESTOST FLD-SCNC: 12.3 NG/DL — SIGNIFICANT CHANGE UP (ref 10–55)
TESTOSTERONE.FREE+WB SERPL-MCNC: 5.4 PG/ML — HIGH (ref 0–4.2)
WBC # BLD: 12.22 K/UL — HIGH (ref 3.8–10.5)
WBC # FLD AUTO: 12.22 K/UL — HIGH (ref 3.8–10.5)

## 2019-11-24 PROCEDURE — 71045 X-RAY EXAM CHEST 1 VIEW: CPT | Mod: 26

## 2019-11-24 PROCEDURE — 99291 CRITICAL CARE FIRST HOUR: CPT

## 2019-11-24 RX ORDER — INSULIN LISPRO 100/ML
10 VIAL (ML) SUBCUTANEOUS
Refills: 0 | Status: DISCONTINUED | OUTPATIENT
Start: 2019-11-24 | End: 2019-11-25

## 2019-11-24 RX ORDER — INSULIN GLARGINE 100 [IU]/ML
30 INJECTION, SOLUTION SUBCUTANEOUS AT BEDTIME
Refills: 0 | Status: DISCONTINUED | OUTPATIENT
Start: 2019-11-24 | End: 2019-11-25

## 2019-11-24 RX ORDER — INSULIN GLARGINE 100 [IU]/ML
36 INJECTION, SOLUTION SUBCUTANEOUS AT BEDTIME
Refills: 0 | Status: DISCONTINUED | OUTPATIENT
Start: 2019-11-24 | End: 2019-11-24

## 2019-11-24 RX ORDER — SODIUM CHLORIDE 9 MG/ML
3 INJECTION INTRAMUSCULAR; INTRAVENOUS; SUBCUTANEOUS EVERY 8 HOURS
Refills: 0 | Status: DISCONTINUED | OUTPATIENT
Start: 2019-11-24 | End: 2019-11-25

## 2019-11-24 RX ORDER — INSULIN LISPRO 100/ML
12 VIAL (ML) SUBCUTANEOUS
Refills: 0 | Status: DISCONTINUED | OUTPATIENT
Start: 2019-11-24 | End: 2019-11-24

## 2019-11-24 RX ADMIN — Medication 15 MILLIGRAM(S): at 14:13

## 2019-11-24 RX ADMIN — Medication 4: at 12:30

## 2019-11-24 RX ADMIN — Medication 2: at 21:37

## 2019-11-24 RX ADMIN — Medication 10 UNIT(S): at 12:29

## 2019-11-24 RX ADMIN — HEPARIN SODIUM 5000 UNIT(S): 5000 INJECTION INTRAVENOUS; SUBCUTANEOUS at 06:03

## 2019-11-24 RX ADMIN — ATORVASTATIN CALCIUM 40 MILLIGRAM(S): 80 TABLET, FILM COATED ORAL at 21:18

## 2019-11-24 RX ADMIN — CHLORHEXIDINE GLUCONATE 1 APPLICATION(S): 213 SOLUTION TOPICAL at 05:53

## 2019-11-24 RX ADMIN — HEPARIN SODIUM 5000 UNIT(S): 5000 INJECTION INTRAVENOUS; SUBCUTANEOUS at 21:18

## 2019-11-24 RX ADMIN — Medication 25 MILLIGRAM(S): at 06:03

## 2019-11-24 RX ADMIN — OXYCODONE AND ACETAMINOPHEN 2 TABLET(S): 5; 325 TABLET ORAL at 17:45

## 2019-11-24 RX ADMIN — Medication 15 MILLIGRAM(S): at 21:40

## 2019-11-24 RX ADMIN — Medication 81 MILLIGRAM(S): at 12:29

## 2019-11-24 RX ADMIN — SODIUM CHLORIDE 3 MILLILITER(S): 9 INJECTION INTRAMUSCULAR; INTRAVENOUS; SUBCUTANEOUS at 13:57

## 2019-11-24 RX ADMIN — OXYCODONE AND ACETAMINOPHEN 2 TABLET(S): 5; 325 TABLET ORAL at 03:38

## 2019-11-24 RX ADMIN — CLOPIDOGREL BISULFATE 75 MILLIGRAM(S): 75 TABLET, FILM COATED ORAL at 12:29

## 2019-11-24 RX ADMIN — INSULIN GLARGINE 30 UNIT(S): 100 INJECTION, SOLUTION SUBCUTANEOUS at 21:37

## 2019-11-24 RX ADMIN — Medication 25 MILLIGRAM(S): at 17:48

## 2019-11-24 RX ADMIN — Medication 15 MILLIGRAM(S): at 06:03

## 2019-11-24 RX ADMIN — HEPARIN SODIUM 5000 UNIT(S): 5000 INJECTION INTRAVENOUS; SUBCUTANEOUS at 13:58

## 2019-11-24 RX ADMIN — SENNA PLUS 2 TABLET(S): 8.6 TABLET ORAL at 21:19

## 2019-11-24 RX ADMIN — Medication 15 MILLIGRAM(S): at 21:00

## 2019-11-24 RX ADMIN — SODIUM CHLORIDE 3 MILLILITER(S): 9 INJECTION INTRAMUSCULAR; INTRAVENOUS; SUBCUTANEOUS at 21:19

## 2019-11-24 RX ADMIN — Medication 15 MILLIGRAM(S): at 13:58

## 2019-11-24 RX ADMIN — POLYETHYLENE GLYCOL 3350 17 GRAM(S): 17 POWDER, FOR SOLUTION ORAL at 12:29

## 2019-11-24 RX ADMIN — OXYCODONE AND ACETAMINOPHEN 2 TABLET(S): 5; 325 TABLET ORAL at 11:00

## 2019-11-24 RX ADMIN — Medication 4: at 18:15

## 2019-11-24 RX ADMIN — Medication 15 MILLIGRAM(S): at 06:23

## 2019-11-24 RX ADMIN — Medication 10 UNIT(S): at 07:32

## 2019-11-24 RX ADMIN — OXYCODONE AND ACETAMINOPHEN 2 TABLET(S): 5; 325 TABLET ORAL at 04:10

## 2019-11-24 RX ADMIN — OXYCODONE AND ACETAMINOPHEN 2 TABLET(S): 5; 325 TABLET ORAL at 11:45

## 2019-11-24 RX ADMIN — PANTOPRAZOLE SODIUM 40 MILLIGRAM(S): 20 TABLET, DELAYED RELEASE ORAL at 06:03

## 2019-11-24 RX ADMIN — Medication 6: at 07:31

## 2019-11-24 RX ADMIN — Medication 12 UNIT(S): at 18:15

## 2019-11-24 RX ADMIN — OXYCODONE AND ACETAMINOPHEN 2 TABLET(S): 5; 325 TABLET ORAL at 16:45

## 2019-11-24 NOTE — PROGRESS NOTE ADULT - SUBJECTIVE AND OBJECTIVE BOX
CTICU  CRITICAL  CARE  attending     Hand off received 					   Pertinent clinical, laboratory, radiographic, hemodynamic, echocardiographic, respiratory data, microbiologic data and chart were reviewed and analyzed frequently throughout the course of the day and night  Patient seen and examined with CTS/ SH attending at bedside    Pt is a 34y , Female, post op day # 3 s/p OPCAB x 3V; ( BIMA + radial art )    post op:    uncontrolled hyperglycemia    Prophylactic measure: Prophylactic measure  HLD (hyperlipidemia): HLD (hyperlipidemia)  Smoking: Smoking  HTN (hypertension): HTN (hypertension)  Type 2 diabetes mellitus: Type 2 diabetes mellitus  NSTEMI (non-ST elevated myocardial infarction): NSTEMI (non-ST elevated myocardial infarction)      , FAMILY HISTORY:  Family history of CABG: mom (age 50s)  FH: myocardial infarction: mom: age 40s s/p PCI  FH: CAD (coronary artery disease): mom; grandmother  PAST MEDICAL & SURGICAL HISTORY:  H/O gastroesophageal reflux (GERD)  Type 2 diabetes mellitus: Diabetes  Encounter for Essure implantation  H/O hernia repair  S/P     Patient is a 34y old  Female who presents with a chief complaint of NSTEMI (2019 22:31)      14 system review was unremarkable    Vital signs, hemodynamic and respiratory parameters were reviewed from the bedside nursing flowsheet.  ICU Vital Signs Last 24 Hrs  T(C): 36.8 (2019 13:30), Max: 37.2 (2019 22:17)  T(F): 98.2 (2019 13:30), Max: 99 (2019 22:17)  HR: 92 (2019 14:00) (80 - 122)  BP: 119/76 (2019 14:00) (92/59 - 122/82)  BP(mean): 96 (2019 14:00) (64 - 96)  ABP: --  ABP(mean): --  RR: 21 (2019 14:00) (18 - 27)  SpO2: 98% (2019 14:00) (95% - 100%)    Adult Advanced Hemodynamics Last 24 Hrs  CVP(mm Hg): --  CVP(cm H2O): --  CO: --  CI: --  PA: --  PA(mean): --  PCWP: --  SVR: --  SVRI: --  PVR: --  PVRI: --, ABG - ( 2019 14:48 )  pH, Arterial: 7.50  pH, Blood: x     /  pCO2: 24    /  pO2: 103   / HCO3: 18    / Base Excess: -4.0  /  SaO2: 98                  Intake and output was reviewed and the fluid balance was calculated  Daily     Daily   I&O's Summary    2019 07:  -  2019 07:00  --------------------------------------------------------  IN: 716 mL / OUT: 310 mL / NET: 406 mL    2019 07:  -  2019 14:43  --------------------------------------------------------  IN: 325 mL / OUT: 0 mL / NET: 325 mL        All lines and drain sites were assessed  [facility]      POCT Blood Glucose.: 231 mg/dL (2019 12:24)    No acute change in mental status  Auscultation of the chest reveals equal bs  Abdomen is soft  Extremities are warm and well perfused  Wounds appear clean and unremarkable  Antibiotics are periop    labs  CBC Full  -  ( 2019 03:38 )  WBC Count : 12.22 K/uL  RBC Count : 3.01 M/uL  Hemoglobin : 9.7 g/dL  Hematocrit : 29.5 %  Platelet Count - Automated : 214 K/uL  Mean Cell Volume : 98.0 fl  Mean Cell Hemoglobin : 32.2 pg  Mean Cell Hemoglobin Concentration : 32.9 gm/dL  Auto Neutrophil # : x  Auto Lymphocyte # : x  Auto Monocyte # : x  Auto Eosinophil # : x  Auto Basophil # : x  Auto Neutrophil % : x  Auto Lymphocyte % : x  Auto Monocyte % : x  Auto Eosinophil % : x  Auto Basophil % : x    24    136  |  103  |  17  ----------------------------<  137<H>  4.0   |  24  |  0.48<L>    Ca    8.6      2019 03:38  Phos  2.6     11-24  Mg     2.0         TPro  6.5  /  Alb  3.6  /  TBili  1.0  /  DBili  x   /  AST  19  /  ALT  13  /  AlkPhos  51      PT/INR - ( 2019 03:38 )   PT: 14.5 sec;   INR: 1.27          PTT - ( 2019 03:38 )  PTT:29.6 sec  The current medications were reviewed   MEDICATIONS  (STANDING):  aspirin enteric coated 81 milliGRAM(s) Oral daily  atorvastatin 40 milliGRAM(s) Oral at bedtime  clopidogrel Tablet 75 milliGRAM(s) Oral daily  dextrose 5%. 1000 milliLiter(s) (50 mL/Hr) IV Continuous <Continuous>  heparin  Injectable 5000 Unit(s) SubCutaneous every 8 hours  insulin glargine Injectable (LANTUS) 30 Unit(s) SubCutaneous at bedtime  insulin lispro (HumaLOG) corrective regimen sliding scale   SubCutaneous Before meals and at bedtime  insulin lispro Injectable (HumaLOG) 10 Unit(s) SubCutaneous three times a day with meals  metoclopramide Injectable 10 milliGRAM(s) IV Push once  metoprolol tartrate 25 milliGRAM(s) Oral every 12 hours  pantoprazole    Tablet 40 milliGRAM(s) Oral before breakfast  polyethylene glycol 3350 17 Gram(s) Oral daily  senna 2 Tablet(s) Oral at bedtime  sodium chloride 0.9% lock flush 3 milliLiter(s) IV Push every 8 hours    MEDICATIONS  (PRN):  acetaminophen   Tablet .. 650 milliGRAM(s) Oral every 6 hours PRN Mild Pain (1 - 3)  dextrose 40% Gel 15 Gram(s) Oral once PRN Blood Glucose LESS THAN 70 milliGRAM(s)/deciliter  glucagon  Injectable 1 milliGRAM(s) IntraMuscular once PRN Glucose LESS THAN 70 milligrams/deciliter  ketorolac   Injectable 15 milliGRAM(s) IV Push every 6 hours PRN Moderate Pain (4 - 6)  oxycodone    5 mG/acetaminophen 325 mG 1 Tablet(s) Oral every 6 hours PRN Moderate Pain (4 - 6)  oxycodone    5 mG/acetaminophen 325 mG 2 Tablet(s) Oral every 6 hours PRN Severe Pain (7 - 10)       PROBLEM LIST/ ASSESSMENT:  HEALTH ISSUES - PROBLEM Dx:  Prophylactic measure: Prophylactic measure  HLD (hyperlipidemia): HLD (hyperlipidemia)  Smoking: Smoking  HTN (hypertension): HTN (hypertension)  Type 2 diabetes mellitus: Type 2 diabetes mellitus  NSTEMI (non-ST elevated myocardial infarction): NSTEMI (non-ST elevated myocardial infarction)      ,   Patient is a 34y old  Female who presents with a chief complaint of NSTEMI (2019 22:31)     s/p CABG      My plan includes :  close hemodynamic, ventilatory and drain monitoring and management per post op routine    Monitor for arrhythmias and monitor parameters for organ perfusion  monitor neurologic status  Head of the bed should remain elevated to 45 deg .   chest PT and IS will be encouraged  monitor adequacy of oxygenation and ventilation and attempt to wean oxygen  Nutritional goals will be met using po eventually , ensure adequate caloric intake and montior the same  Stress ulcer and VTE prophylaxis will be achieved    Glycemic control is satisfactory  Electrolytes have been repleted as necessary and wound care has been carried out. Pain control has been achieved.   agressive physical therapy and early mobility and ambulation goals will be met   The family was updated about the course and plan  CRITICAL CARE TIME SPENT in evaluation and management, reassessments, review and interpretation of labs and x-rays, ventilator and hemodynamic management, formulating a plan and coordinating care: __25____ MIN.  Time does not include procedural time.  CTICU ATTENDING     					    Anurag Rey MD

## 2019-11-24 NOTE — PROGRESS NOTE ADULT - SUBJECTIVE AND OBJECTIVE BOX
Patient discussed on morning rounds with Dr. David    Operation / Date: OCPCAB 11/21    SUBJECTIVE ASSESSMENT:  34y Female doing well, no acute complaints        Vital Signs Last 24 Hrs  T(C): 36.8 (24 Nov 2019 13:30), Max: 37.2 (23 Nov 2019 22:17)  T(F): 98.2 (24 Nov 2019 13:30), Max: 99 (23 Nov 2019 22:17)  HR: 92 (24 Nov 2019 14:00) (80 - 122) SR  BP: 119/76 (24 Nov 2019 14:00) (92/59 - 122/82)  BP(mean): 96 (24 Nov 2019 14:00) (64 - 96)  RR: 21 (24 Nov 2019 14:00) (18 - 27)  SpO2: 98% (24 Nov 2019 14:00) (95% - 100%) RA  I&O's Detail    23 Nov 2019 07:01  -  24 Nov 2019 07:00  --------------------------------------------------------  IN:    insulin regular Infusion: 11 mL    Oral Fluid: 665 mL    sodium chloride 0.9%: 40 mL  Total IN: 716 mL    OUT:    Voided: 310 mL  Total OUT: 310 mL    Total NET: 406 mL      24 Nov 2019 07:01  -  24 Nov 2019 15:32  --------------------------------------------------------  IN:    Oral Fluid: 325 mL  Total IN: 325 mL    OUT:  Total OUT: 0 mL    Total NET: 325 mL          CHEST TUBE:  No  EPICARDIAL WIRES: Yes  TIE DOWNS: Yes.  BENAVIDES: No.    PHYSICAL EXAM:    General: NAD    Neurological: A&O, non focal    Cardiovascular: RRR, no m/r/g    Respiratory: CTABL    Gastrointestinal: + BS, soft, non tender    Extremities: warm, no edema    Incision Sites: c/d/i      LABS:                        9.7    12.22 )-----------( 214      ( 24 Nov 2019 03:38 )             29.5       PT/INR - ( 24 Nov 2019 03:38 )   PT: 14.5 sec;   INR: 1.27          PTT - ( 24 Nov 2019 03:38 )  PTT:29.6 sec    11-24    136  |  103  |  17  ----------------------------<  137<H>  4.0   |  24  |  0.48<L>    Ca    8.6      24 Nov 2019 03:38  Phos  2.6     11-24  Mg     2.0     11-24    TPro  6.5  /  Alb  3.6  /  TBili  1.0  /  DBili  x   /  AST  19  /  ALT  13  /  AlkPhos  51  11-24          MEDICATIONS  (STANDING):  aspirin enteric coated 81 milliGRAM(s) Oral daily  atorvastatin 40 milliGRAM(s) Oral at bedtime  clopidogrel Tablet 75 milliGRAM(s) Oral daily  heparin  Injectable 5000 Unit(s) SubCutaneous every 8 hours  insulin glargine Injectable (LANTUS) 30 Unit(s) SubCutaneous at bedtime  insulin lispro (HumaLOG) corrective regimen sliding scale   SubCutaneous Before meals and at bedtime  insulin lispro Injectable (HumaLOG) 10 Unit(s) SubCutaneous three times a day with meals  metoclopramide Injectable 10 milliGRAM(s) IV Push once  metoprolol tartrate 25 milliGRAM(s) Oral every 12 hours  pantoprazole    Tablet 40 milliGRAM(s) Oral before breakfast  polyethylene glycol 3350 17 Gram(s) Oral daily  senna 2 Tablet(s) Oral at bedtime  sodium chloride 0.9% lock flush 3 milliLiter(s) IV Push every 8 hours    MEDICATIONS  (PRN):  acetaminophen   Tablet .. 650 milliGRAM(s) Oral every 6 hours PRN Mild Pain (1 - 3)  ketorolac   Injectable 15 milliGRAM(s) IV Push every 6 hours PRN Moderate Pain (4 - 6)  oxycodone    5 mG/acetaminophen 325 mG 1 Tablet(s) Oral every 6 hours PRN Moderate Pain (4 - 6)  oxycodone    5 mG/acetaminophen 325 mG 2 Tablet(s) Oral every 6 hours PRN Severe Pain (7 - 10) Patient discussed on morning rounds with Dr. David    Operation / Date: OCPCAB 11/21    SUBJECTIVE ASSESSMENT:  34y Female doing well, no acute complaints        Vital Signs Last 24 Hrs  T(C): 36.8 (24 Nov 2019 13:30), Max: 37.2 (23 Nov 2019 22:17)  T(F): 98.2 (24 Nov 2019 13:30), Max: 99 (23 Nov 2019 22:17)  HR: 92 (24 Nov 2019 14:00) (80 - 122) SR  BP: 119/76 (24 Nov 2019 14:00) (92/59 - 122/82)  BP(mean): 96 (24 Nov 2019 14:00) (64 - 96)  RR: 21 (24 Nov 2019 14:00) (18 - 27)  SpO2: 98% (24 Nov 2019 14:00) (95% - 100%) RA  I&O's Detail    23 Nov 2019 07:01  -  24 Nov 2019 07:00  --------------------------------------------------------  IN:    insulin regular Infusion: 11 mL    Oral Fluid: 665 mL    sodium chloride 0.9%: 40 mL  Total IN: 716 mL    OUT:    Voided: 310 mL  Total OUT: 310 mL    Total NET: 406 mL      24 Nov 2019 07:01  -  24 Nov 2019 15:32  --------------------------------------------------------  IN:    Oral Fluid: 325 mL  Total IN: 325 mL    OUT:  Total OUT: 0 mL    Total NET: 325 mL          CHEST TUBE:  No  EPICARDIAL WIRES: Yes  TIE DOWNS: Yes.  BENAVIDES: No.    PHYSICAL EXAM:    General: NAD    Neurological: A&O, non focal    Cardiovascular: RRR, no m/r/g    Respiratory: CTABL    Gastrointestinal: + BS, soft, non tender    Extremities: warm, no edema    Incision Sites: c/d/i      LABS:                        9.7    12.22 )-----------( 214      ( 24 Nov 2019 03:38 )             29.5       PT/INR - ( 24 Nov 2019 03:38 )   PT: 14.5 sec;   INR: 1.27          PTT - ( 24 Nov 2019 03:38 )  PTT:29.6 sec    11-24    136  |  103  |  17  ----------------------------<  137<H>  4.0   |  24  |  0.48<L>    Ca    8.6      24 Nov 2019 03:38  Phos  2.6     11-24  Mg     2.0     11-24    TPro  6.5  /  Alb  3.6  /  TBili  1.0  /  DBili  x   /  AST  19  /  ALT  13  /  AlkPhos  51  11-24          MEDICATIONS  (STANDING):  aspirin enteric coated 81 milliGRAM(s) Oral daily  atorvastatin 40 milliGRAM(s) Oral at bedtime  clopidogrel Tablet 75 milliGRAM(s) Oral daily  heparin  Injectable 5000 Unit(s) SubCutaneous every 8 hours  insulin glargine Injectable (LANTUS) 30 Unit(s) SubCutaneous at bedtime  insulin lispro (HumaLOG) corrective regimen sliding scale   SubCutaneous Before meals and at bedtime  insulin lispro Injectable (HumaLOG) 10 Unit(s) SubCutaneous three times a day with meals  metoprolol tartrate 25 milliGRAM(s) Oral every 12 hours  pantoprazole    Tablet 40 milliGRAM(s) Oral before breakfast  polyethylene glycol 3350 17 Gram(s) Oral daily  senna 2 Tablet(s) Oral at bedtime  sodium chloride 0.9% lock flush 3 milliLiter(s) IV Push every 8 hours    MEDICATIONS  (PRN):  acetaminophen   Tablet .. 650 milliGRAM(s) Oral every 6 hours PRN Mild Pain (1 - 3)  ketorolac   Injectable 15 milliGRAM(s) IV Push every 6 hours PRN Moderate Pain (4 - 6)  oxycodone    5 mG/acetaminophen 325 mG 1 Tablet(s) Oral every 6 hours PRN Moderate Pain (4 - 6)  oxycodone    5 mG/acetaminophen 325 mG 2 Tablet(s) Oral every 6 hours PRN Severe Pain (7 - 10)

## 2019-11-24 NOTE — PROGRESS NOTE ADULT - ASSESSMENT
35 y/o Female smoker and Marijuana user with a strong FHx of CAD, with a PMHx if HTN, HLD, and uncontrolled DM-II (HgA1c 11), Arthritis, and GERD who initially presented to [place] [facility] 11/17/2019 with c/o chest pain X 1 day, r/i NSTEMI and was transferred to [facility] for Cardiac Catheterization 11/18/2019: 3 V CAD; mLAD 90 %; D1 80 %; OM2 99 %; RPL 99 %; Right Radial access. She is POD #3 from OPCAB x 3 LIMA-LAD, SHARMILA y-graft- OM, radial -ELISHA, EF60%    Neuro:  -Pain well controlled for PRN 33 y/o Female smoker and Marijuana user with a strong FHx of CAD, with a PMHx if HTN, HLD, and uncontrolled DM-II (HgA1c 11), Arthritis, and GERD who initially presented to [place] [facility] 11/17/2019 with c/o chest pain X 1 day, r/i NSTEMI and was transferred to [facility] for Cardiac Catheterization 11/18/2019: 3 V CAD; mLAD 90 %; D1 80 %; OM2 99 %; RPL 99 %; Right Radial access. She is POD #3 from OPCAB x 3 LIMA-LAD, SHARMILA y-graft- OM, radial -ELISHA, EF60%    Neuro:  -Pain well controlled for PRN Tylenol, Toradol, Percocet    Cardiac:  -90 SR, SBP 100s-110s. Lopressor 25 mg BID, titrate up as needed.  -Con't ASA, Plavix, Statin.     Pulm:  -SaO2 good on RA.   -CXR clear    GI:  -Tolerating PO.   -Protonix for GI ppx.   -Bowel regimen.    Endo  -Hg A1c 11, -250s  -Sekou Leone, consulted   -Lantus 30 units QHS and Lispro 10 units premeal. Con't ISS.     Heme:  -Hep SQ for DVT ppx.     Dispo:  -Plan for d/c home tomorrow.

## 2019-11-24 NOTE — PROGRESS NOTE ADULT - SUBJECTIVE AND OBJECTIVE BOX
INTERVAL HPI/OVERNIGHT EVENTS:    Patient is a 34y old  Female who presents with a chief complaint of NSTEMI (24 Nov 2019 15:31)  no acute overnight events  pt out of bed and walking  eating well  insulin administration reviewed.       Pt reports the following symptoms:    CONSTITUTIONAL:  Negative fever or chills, feels well, good appetite  EYES:  Negative  blurry vision or double vision  CARDIOVASCULAR:  Negative for chest pain or palpitations  RESPIRATORY:  Negative for cough, wheezing, or SOB   GASTROINTESTINAL:  Negative for nausea, vomiting, diarrhea, constipation, or abdominal pain  GENITOURINARY:  Negative frequency, urgency or dysuria  NEUROLOGIC:  No headache, confusion, dizziness, lightheadedness    MEDICATIONS  (STANDING):  aspirin enteric coated 81 milliGRAM(s) Oral daily  atorvastatin 40 milliGRAM(s) Oral at bedtime  clopidogrel Tablet 75 milliGRAM(s) Oral daily  dextrose 5%. 1000 milliLiter(s) (50 mL/Hr) IV Continuous <Continuous>  heparin  Injectable 5000 Unit(s) SubCutaneous every 8 hours  insulin glargine Injectable (LANTUS) 30 Unit(s) SubCutaneous at bedtime  insulin lispro (HumaLOG) corrective regimen sliding scale   SubCutaneous Before meals and at bedtime  insulin lispro Injectable (HumaLOG) 10 Unit(s) SubCutaneous three times a day with meals  metoclopramide Injectable 10 milliGRAM(s) IV Push once  metoprolol tartrate 25 milliGRAM(s) Oral every 12 hours  pantoprazole    Tablet 40 milliGRAM(s) Oral before breakfast  polyethylene glycol 3350 17 Gram(s) Oral daily  senna 2 Tablet(s) Oral at bedtime  sodium chloride 0.9% lock flush 3 milliLiter(s) IV Push every 8 hours    MEDICATIONS  (PRN):  acetaminophen   Tablet .. 650 milliGRAM(s) Oral every 6 hours PRN Mild Pain (1 - 3)  dextrose 40% Gel 15 Gram(s) Oral once PRN Blood Glucose LESS THAN 70 milliGRAM(s)/deciliter  glucagon  Injectable 1 milliGRAM(s) IntraMuscular once PRN Glucose LESS THAN 70 milligrams/deciliter  ketorolac   Injectable 15 milliGRAM(s) IV Push every 6 hours PRN Moderate Pain (4 - 6)  oxycodone    5 mG/acetaminophen 325 mG 1 Tablet(s) Oral every 6 hours PRN Moderate Pain (4 - 6)  oxycodone    5 mG/acetaminophen 325 mG 2 Tablet(s) Oral every 6 hours PRN Severe Pain (7 - 10)      Past medical history reviewed  Family history reviewed  Social history reviewed    PHYSICAL EXAM  Vital Signs Last 24 Hrs  T(C): 36.7 (24 Nov 2019 21:47), Max: 37 (24 Nov 2019 05:01)  T(F): 98.1 (24 Nov 2019 21:47), Max: 98.6 (24 Nov 2019 05:01)  HR: 96 (24 Nov 2019 20:20) (80 - 106)  BP: 128/83 (24 Nov 2019 20:20) (92/59 - 128/83)  BP(mean): 97 (24 Nov 2019 20:20) (64 - 97)  RR: 18 (24 Nov 2019 20:20) (18 - 27)  SpO2: 97% (24 Nov 2019 20:20) (95% - 99%)    Constitutional: wn/wd in NAD.   HEENT: NCAT, MMM, OP clear, EOMI, no proptosis or lid retraction  Neck: no thyromegaly or palpable thyroid nodules   Respiratory: lungs CTAB.  Cardiovascular: regular rhythm, normal S1 and S2, no audible murmurs, no peripheral edema  GI: soft, NT/ND, no masses/HSM appreciated.  Neurology: no tremors, DTR 2+  Skin: no visible rashes/lesions  Psychiatric: AAO x 3, normal affect/mood.    LABS:                        9.7    12.22 )-----------( 214      ( 24 Nov 2019 03:38 )             29.5     11-24    136  |  103  |  17  ----------------------------<  137<H>  4.0   |  24  |  0.48<L>    Ca    8.6      24 Nov 2019 03:38  Phos  2.6     11-24  Mg     2.0     11-24    TPro  6.5  /  Alb  3.6  /  TBili  1.0  /  DBili  x   /  AST  19  /  ALT  13  /  AlkPhos  51  11-24    PT/INR - ( 24 Nov 2019 03:38 )   PT: 14.5 sec;   INR: 1.27          PTT - ( 24 Nov 2019 03:38 )  PTT:29.6 sec    Thyroid Stimulating Hormone, Serum: 1.177 uIU/mL (11-19 @ 06:36)  Thyroid Stimulating Hormone, Serum: 1.218 uIU/mL (11-18 @ 17:02)      HbA1C: 10.6 % (11-19 @ 06:36)  11.0 % (11-18 @ 17:02)    CAPILLARY BLOOD GLUCOSE      POCT Blood Glucose.: 153 mg/dL (24 Nov 2019 21:32)  POCT Blood Glucose.: 218 mg/dL (24 Nov 2019 17:55)  POCT Blood Glucose.: 231 mg/dL (24 Nov 2019 12:24)  POCT Blood Glucose.: 246 mg/dL (24 Nov 2019 11:15)  POCT Blood Glucose.: 251 mg/dL (24 Nov 2019 07:11)  POCT Blood Glucose.: 138 mg/dL (24 Nov 2019 00:25)      Direct LDL: 126 mg/dL (11-18-19 @ 17:02)

## 2019-11-25 ENCOUNTER — TRANSCRIPTION ENCOUNTER (OUTPATIENT)
Age: 34
End: 2019-11-25

## 2019-11-25 VITALS
SYSTOLIC BLOOD PRESSURE: 183 MMHG | OXYGEN SATURATION: 97 % | HEART RATE: 97 BPM | RESPIRATION RATE: 18 BRPM | DIASTOLIC BLOOD PRESSURE: 79 MMHG

## 2019-11-25 PROBLEM — Z87.19 PERSONAL HISTORY OF OTHER DISEASES OF THE DIGESTIVE SYSTEM: Chronic | Status: ACTIVE | Noted: 2019-11-18

## 2019-11-25 LAB
ANION GAP SERPL CALC-SCNC: 10 MMOL/L — SIGNIFICANT CHANGE UP (ref 5–17)
BUN SERPL-MCNC: 18 MG/DL — SIGNIFICANT CHANGE UP (ref 7–23)
CALCIUM SERPL-MCNC: 9 MG/DL — SIGNIFICANT CHANGE UP (ref 8.4–10.5)
CHLORIDE SERPL-SCNC: 101 MMOL/L — SIGNIFICANT CHANGE UP (ref 96–108)
CO2 SERPL-SCNC: 23 MMOL/L — SIGNIFICANT CHANGE UP (ref 22–31)
CREAT SERPL-MCNC: 0.48 MG/DL — LOW (ref 0.5–1.3)
GLUCOSE BLDC GLUCOMTR-MCNC: 248 MG/DL — HIGH (ref 70–99)
GLUCOSE BLDC GLUCOMTR-MCNC: 254 MG/DL — HIGH (ref 70–99)
GLUCOSE SERPL-MCNC: 214 MG/DL — HIGH (ref 70–99)
HCT VFR BLD CALC: 28.3 % — LOW (ref 34.5–45)
HGB BLD-MCNC: 9.2 G/DL — LOW (ref 11.5–15.5)
MAGNESIUM SERPL-MCNC: 2 MG/DL — SIGNIFICANT CHANGE UP (ref 1.6–2.6)
MCHC RBC-ENTMCNC: 32.2 PG — SIGNIFICANT CHANGE UP (ref 27–34)
MCHC RBC-ENTMCNC: 32.5 GM/DL — SIGNIFICANT CHANGE UP (ref 32–36)
MCV RBC AUTO: 99 FL — SIGNIFICANT CHANGE UP (ref 80–100)
NRBC # BLD: 0 /100 WBCS — SIGNIFICANT CHANGE UP (ref 0–0)
PLATELET # BLD AUTO: 293 K/UL — SIGNIFICANT CHANGE UP (ref 150–400)
POTASSIUM SERPL-MCNC: 3.9 MMOL/L — SIGNIFICANT CHANGE UP (ref 3.5–5.3)
POTASSIUM SERPL-SCNC: 3.9 MMOL/L — SIGNIFICANT CHANGE UP (ref 3.5–5.3)
RBC # BLD: 2.86 M/UL — LOW (ref 3.8–5.2)
RBC # FLD: 11.4 % — SIGNIFICANT CHANGE UP (ref 10.3–14.5)
SODIUM SERPL-SCNC: 134 MMOL/L — LOW (ref 135–145)
WBC # BLD: 9.28 K/UL — SIGNIFICANT CHANGE UP (ref 3.8–10.5)
WBC # FLD AUTO: 9.28 K/UL — SIGNIFICANT CHANGE UP (ref 3.8–10.5)

## 2019-11-25 PROCEDURE — 71046 X-RAY EXAM CHEST 2 VIEWS: CPT | Mod: 26

## 2019-11-25 RX ORDER — INSULIN GLARGINE 100 [IU]/ML
36 INJECTION, SOLUTION SUBCUTANEOUS
Qty: 5 | Refills: 0
Start: 2019-11-25 | End: 2019-12-24

## 2019-11-25 RX ORDER — METFORMIN HYDROCHLORIDE 850 MG/1
1 TABLET ORAL
Qty: 0 | Refills: 0 | DISCHARGE

## 2019-11-25 RX ORDER — INSULIN GLARGINE 100 [IU]/ML
36 INJECTION, SOLUTION SUBCUTANEOUS
Qty: 1 | Refills: 0
Start: 2019-11-25 | End: 2019-12-01

## 2019-11-25 RX ORDER — EMPAGLIFLOZIN 10 MG/1
1 TABLET, FILM COATED ORAL
Qty: 7 | Refills: 0
Start: 2019-11-25 | End: 2019-12-01

## 2019-11-25 RX ORDER — METFORMIN HYDROCHLORIDE 850 MG/1
1 TABLET ORAL
Qty: 14 | Refills: 0
Start: 2019-11-25 | End: 2019-12-01

## 2019-11-25 RX ORDER — INSULIN LISPRO 100/ML
12 VIAL (ML) SUBCUTANEOUS
Qty: 1 | Refills: 0
Start: 2019-11-25 | End: 2019-12-01

## 2019-11-25 RX ORDER — ISOPROPYL ALCOHOL, BENZOCAINE .7; .06 ML/ML; ML/ML
1 SWAB TOPICAL
Qty: 200 | Refills: 0
Start: 2019-11-25 | End: 2019-12-24

## 2019-11-25 RX ORDER — METOPROLOL TARTRATE 50 MG
1 TABLET ORAL
Qty: 60 | Refills: 0
Start: 2019-11-25 | End: 2019-12-24

## 2019-11-25 RX ORDER — ATORVASTATIN CALCIUM 80 MG/1
1 TABLET, FILM COATED ORAL
Qty: 7 | Refills: 0
Start: 2019-11-25 | End: 2019-12-01

## 2019-11-25 RX ORDER — ASPIRIN/CALCIUM CARB/MAGNESIUM 324 MG
1 TABLET ORAL
Qty: 30 | Refills: 0
Start: 2019-11-25 | End: 2019-12-24

## 2019-11-25 RX ORDER — CLOPIDOGREL BISULFATE 75 MG/1
1 TABLET, FILM COATED ORAL
Qty: 30 | Refills: 0
Start: 2019-11-25 | End: 2019-12-24

## 2019-11-25 RX ORDER — PANTOPRAZOLE SODIUM 20 MG/1
1 TABLET, DELAYED RELEASE ORAL
Qty: 30 | Refills: 0
Start: 2019-11-25 | End: 2019-12-24

## 2019-11-25 RX ORDER — ATORVASTATIN CALCIUM 80 MG/1
1 TABLET, FILM COATED ORAL
Qty: 30 | Refills: 0
Start: 2019-11-25 | End: 2019-12-24

## 2019-11-25 RX ORDER — INSULIN LISPRO 100/ML
12 VIAL (ML) SUBCUTANEOUS
Qty: 5 | Refills: 0
Start: 2019-11-25 | End: 2019-12-24

## 2019-11-25 RX ORDER — POTASSIUM CHLORIDE 20 MEQ
20 PACKET (EA) ORAL ONCE
Refills: 0 | Status: COMPLETED | OUTPATIENT
Start: 2019-11-25 | End: 2019-11-25

## 2019-11-25 RX ORDER — FUROSEMIDE 40 MG
10 TABLET ORAL ONCE
Refills: 0 | Status: COMPLETED | OUTPATIENT
Start: 2019-11-25 | End: 2019-11-25

## 2019-11-25 RX ORDER — SENNA PLUS 8.6 MG/1
1 TABLET ORAL
Qty: 30 | Refills: 0
Start: 2019-11-25 | End: 2019-12-24

## 2019-11-25 RX ORDER — MAGNESIUM OXIDE 400 MG ORAL TABLET 241.3 MG
800 TABLET ORAL ONCE
Refills: 0 | Status: COMPLETED | OUTPATIENT
Start: 2019-11-25 | End: 2019-11-25

## 2019-11-25 RX ORDER — PANTOPRAZOLE SODIUM 20 MG/1
1 TABLET, DELAYED RELEASE ORAL
Qty: 0 | Refills: 0 | DISCHARGE

## 2019-11-25 RX ORDER — CLOPIDOGREL BISULFATE 75 MG/1
1 TABLET, FILM COATED ORAL
Qty: 7 | Refills: 0
Start: 2019-11-25 | End: 2019-12-01

## 2019-11-25 RX ORDER — METFORMIN HYDROCHLORIDE 850 MG/1
1 TABLET ORAL
Qty: 60 | Refills: 0
Start: 2019-11-25 | End: 2019-12-24

## 2019-11-25 RX ORDER — METOPROLOL TARTRATE 50 MG
1 TABLET ORAL
Qty: 14 | Refills: 0
Start: 2019-11-25 | End: 2019-12-01

## 2019-11-25 RX ORDER — PANTOPRAZOLE SODIUM 20 MG/1
1 TABLET, DELAYED RELEASE ORAL
Qty: 7 | Refills: 0
Start: 2019-11-25 | End: 2019-12-01

## 2019-11-25 RX ORDER — ISOPROPYL ALCOHOL, BENZOCAINE .7; .06 ML/ML; ML/ML
1 SWAB TOPICAL
Qty: 100 | Refills: 0
Start: 2019-11-25 | End: 2019-12-19

## 2019-11-25 RX ORDER — EMPAGLIFLOZIN 10 MG/1
1 TABLET, FILM COATED ORAL
Qty: 30 | Refills: 0
Start: 2019-11-25 | End: 2019-12-24

## 2019-11-25 RX ORDER — ISOPROPYL ALCOHOL, BENZOCAINE .7; .06 ML/ML; ML/ML
1 SWAB TOPICAL
Qty: 100 | Refills: 0
Start: 2019-11-25 | End: 2019-12-24

## 2019-11-25 RX ORDER — ACETAMINOPHEN 500 MG
1 TABLET ORAL
Qty: 120 | Refills: 0
Start: 2019-11-25 | End: 2019-12-24

## 2019-11-25 RX ADMIN — Medication 25 MILLIGRAM(S): at 05:18

## 2019-11-25 RX ADMIN — OXYCODONE AND ACETAMINOPHEN 2 TABLET(S): 5; 325 TABLET ORAL at 09:30

## 2019-11-25 RX ADMIN — Medication 20 MILLIEQUIVALENT(S): at 08:56

## 2019-11-25 RX ADMIN — Medication 10 UNIT(S): at 07:31

## 2019-11-25 RX ADMIN — Medication 15 MILLIGRAM(S): at 03:00

## 2019-11-25 RX ADMIN — MAGNESIUM OXIDE 400 MG ORAL TABLET 800 MILLIGRAM(S): 241.3 TABLET ORAL at 11:37

## 2019-11-25 RX ADMIN — PANTOPRAZOLE SODIUM 40 MILLIGRAM(S): 20 TABLET, DELAYED RELEASE ORAL at 06:47

## 2019-11-25 RX ADMIN — CLOPIDOGREL BISULFATE 75 MILLIGRAM(S): 75 TABLET, FILM COATED ORAL at 11:37

## 2019-11-25 RX ADMIN — Medication 650 MILLIGRAM(S): at 07:00

## 2019-11-25 RX ADMIN — HEPARIN SODIUM 5000 UNIT(S): 5000 INJECTION INTRAVENOUS; SUBCUTANEOUS at 05:18

## 2019-11-25 RX ADMIN — Medication 81 MILLIGRAM(S): at 11:37

## 2019-11-25 RX ADMIN — Medication 10 UNIT(S): at 12:07

## 2019-11-25 RX ADMIN — Medication 4: at 12:07

## 2019-11-25 RX ADMIN — POLYETHYLENE GLYCOL 3350 17 GRAM(S): 17 POWDER, FOR SOLUTION ORAL at 11:38

## 2019-11-25 RX ADMIN — HEPARIN SODIUM 5000 UNIT(S): 5000 INJECTION INTRAVENOUS; SUBCUTANEOUS at 13:38

## 2019-11-25 RX ADMIN — SODIUM CHLORIDE 3 MILLILITER(S): 9 INJECTION INTRAMUSCULAR; INTRAVENOUS; SUBCUTANEOUS at 14:12

## 2019-11-25 RX ADMIN — Medication 650 MILLIGRAM(S): at 01:50

## 2019-11-25 RX ADMIN — Medication 6: at 07:31

## 2019-11-25 RX ADMIN — SODIUM CHLORIDE 3 MILLILITER(S): 9 INJECTION INTRAMUSCULAR; INTRAVENOUS; SUBCUTANEOUS at 05:19

## 2019-11-25 RX ADMIN — Medication 10 MILLIGRAM(S): at 13:55

## 2019-11-25 RX ADMIN — OXYCODONE AND ACETAMINOPHEN 2 TABLET(S): 5; 325 TABLET ORAL at 08:56

## 2019-11-25 RX ADMIN — Medication 15 MILLIGRAM(S): at 03:30

## 2019-11-25 RX ADMIN — Medication 650 MILLIGRAM(S): at 01:09

## 2019-11-25 RX ADMIN — Medication 650 MILLIGRAM(S): at 07:45

## 2019-11-25 NOTE — DISCHARGE NOTE NURSING/CASE MANAGEMENT/SOCIAL WORK - NSDCPEWEB_GEN_ALL_CORE
[facility] for Tobacco Control website --- [email or web address] website --- [email or web address]

## 2019-11-25 NOTE — DISCHARGE NOTE NURSING/CASE MANAGEMENT/SOCIAL WORK - NSDCFUADDAPPT_GEN_ALL_CORE_FT
-Please follow up with Dr. Hesham David on12/3/19 at 11:30am.  The office is located at [facility], [facility], 4th floor. Call us with any questions #[phone].    -Please follow up with Dr. Kurt Lucero (referring MD) on 12.13.19 at 10am. The office is located at [address] [place] [address]. Call  with any questions.    -Please follow up with Dr. Vicki Sapp in 2 weeks.  The office is located at [address] [place] [address], [address] [place] [address]. Call  for an appointment and let them know Dr. Sapp agreed to see you.

## 2019-11-25 NOTE — PROGRESS NOTE ADULT - ASSESSMENT
-Please follow up with Dr. Hesham David on12/3/19 at 11:30am.  The office is located at [facility], [facility], 4th floor. Call us with any questions #[phone].    -Please follow up with Dr. Kurt Lucero (referring MD) on 12.13.19 at 10am. The office is located at [address] [place] [address]. Call  with any questions. -Please follow up with Dr. Hesham David on12/3/19 at 11:30am.  The office is located at [facility], [facility], 4th floor. Call us with any questions #[phone].    -Please follow up with Dr. Kurt Lucero (referring MD) on 12.13.19 at 10am. The office is located at [address] [place] [address]. Call  with any questions.    -Please follow up with Dr. Vicki Sapp in 2 weeks.  The office is located at [address] [place] [address], [address] [place] [address]. Call  for an appointment and let them know Dr. Sapp agreed to see you.

## 2019-11-25 NOTE — PROGRESS NOTE ADULT - NSHPATTENDINGPLANDISCUSS_GEN_ALL_CORE
CT Surgery PA Dede Sharp
CT Surgery Team
CT Surgery Team, PARKER Aguayo
Cardiology PARKER Britton
CT Surgery Team
Cardiothoracic Team
the patient and 5u team
the patient and 5u team

## 2019-11-25 NOTE — DISCHARGE NOTE NURSING/CASE MANAGEMENT/SOCIAL WORK - PATIENT PORTAL LINK FT
You can access the FollowMyHealth Patient Portal offered by [facility] by registering at the following website: [email or web address]. By joining [employer]’s FollowMyHealth portal, you will also be able to view your health information using other applications (apps) compatible with our system.

## 2019-11-25 NOTE — DISCHARGE NOTE NURSING/CASE MANAGEMENT/SOCIAL WORK - NSDCPEEMAIL_GEN_ALL_CORE
[facility] for Tobacco Control email [email or web address]

## 2019-11-25 NOTE — PROGRESS NOTE ADULT - REASON FOR ADMISSION
NSTEMI
Cardiac Catheterization
NSTEMI

## 2019-11-25 NOTE — PROGRESS NOTE ADULT - SUBJECTIVE AND OBJECTIVE BOX
INTERVAL HPI/OVERNIGHT EVENTS:    Patient is a 34y old  Female who presents with a chief complaint of NSTEMI (25 Nov 2019 10:56)  No acute overnight events  pt eating well  insulin administration reviewed  denies nausea, vomiting, abdominal pain, SOB, chest pain palpitations, urinary sx, headache, dizziness, subjective fever, chills    Pt reports the following symptoms:    CONSTITUTIONAL:  Negative fever or chills, feels well, good appetite  EYES:  Negative  blurry vision or double vision  CARDIOVASCULAR:  Negative for chest pain or palpitations  RESPIRATORY:  Negative for cough, wheezing, or SOB   GASTROINTESTINAL:  Negative for nausea, vomiting, diarrhea, constipation, or abdominal pain  GENITOURINARY:  Negative frequency, urgency or dysuria  NEUROLOGIC:  No headache, confusion, dizziness, lightheadedness    MEDICATIONS  (STANDING):  aspirin enteric coated 81 milliGRAM(s) Oral daily  atorvastatin 40 milliGRAM(s) Oral at bedtime  clopidogrel Tablet 75 milliGRAM(s) Oral daily  dextrose 5%. 1000 milliLiter(s) (50 mL/Hr) IV Continuous <Continuous>  heparin  Injectable 5000 Unit(s) SubCutaneous every 8 hours  insulin glargine Injectable (LANTUS) 30 Unit(s) SubCutaneous at bedtime  insulin lispro (HumaLOG) corrective regimen sliding scale   SubCutaneous Before meals and at bedtime  insulin lispro Injectable (HumaLOG) 10 Unit(s) SubCutaneous three times a day with meals  metoclopramide Injectable 10 milliGRAM(s) IV Push once  metoprolol tartrate 25 milliGRAM(s) Oral every 12 hours  pantoprazole    Tablet 40 milliGRAM(s) Oral before breakfast  polyethylene glycol 3350 17 Gram(s) Oral daily  senna 2 Tablet(s) Oral at bedtime  sodium chloride 0.9% lock flush 3 milliLiter(s) IV Push every 8 hours    MEDICATIONS  (PRN):  acetaminophen   Tablet .. 650 milliGRAM(s) Oral every 6 hours PRN Mild Pain (1 - 3)  dextrose 40% Gel 15 Gram(s) Oral once PRN Blood Glucose LESS THAN 70 milliGRAM(s)/deciliter  glucagon  Injectable 1 milliGRAM(s) IntraMuscular once PRN Glucose LESS THAN 70 milligrams/deciliter  ketorolac   Injectable 15 milliGRAM(s) IV Push every 6 hours PRN Moderate Pain (4 - 6)  oxycodone    5 mG/acetaminophen 325 mG 1 Tablet(s) Oral every 6 hours PRN Moderate Pain (4 - 6)  oxycodone    5 mG/acetaminophen 325 mG 2 Tablet(s) Oral every 6 hours PRN Severe Pain (7 - 10)      Past medical history reviewed  Family history reviewed  Social history reviewed    PHYSICAL EXAM  Vital Signs Last 24 Hrs  T(C): 37.2 (25 Nov 2019 09:00), Max: 37.2 (25 Nov 2019 09:00)  T(F): 98.9 (25 Nov 2019 09:00), Max: 98.9 (25 Nov 2019 09:00)  HR: 97 (25 Nov 2019 14:10) (86 - 104)  BP: 183/79 (25 Nov 2019 14:10) (113/62 - 183/79)  BP(mean): 100 (25 Nov 2019 14:10) (81 - 103)  RR: 18 (25 Nov 2019 14:10) (16 - 20)  SpO2: 97% (25 Nov 2019 14:10) (96% - 98%)    Constitutional: wn/wd in NAD.   HEENT: NCAT, MMM, OP clear, EOMI, no proptosis or lid retraction  Neck: no thyromegaly or palpable thyroid nodules   Respiratory: lungs CTAB.  Cardiovascular: regular rhythm, normal S1 and S2, no audible murmurs, no peripheral edema  GI: soft, NT/ND, no masses/HSM appreciated.  Neurology: no tremors, DTR 2+  Skin: no visible rashes/lesions  Psychiatric: AAO x 3, normal affect/mood.    LABS:                        9.2    9.28  )-----------( 293      ( 25 Nov 2019 06:14 )             28.3     11-25    134<L>  |  101  |  18  ----------------------------<  214<H>  3.9   |  23  |  0.48<L>    Ca    9.0      25 Nov 2019 06:14  Phos  2.6     11-24  Mg     2.0     11-25    TPro  6.5  /  Alb  3.6  /  TBili  1.0  /  DBili  x   /  AST  19  /  ALT  13  /  AlkPhos  51  11-24    PT/INR - ( 24 Nov 2019 03:38 )   PT: 14.5 sec;   INR: 1.27          PTT - ( 24 Nov 2019 03:38 )  PTT:29.6 sec    Thyroid Stimulating Hormone, Serum: 1.177 uIU/mL (11-19 @ 06:36)  Thyroid Stimulating Hormone, Serum: 1.218 uIU/mL (11-18 @ 17:02)      HbA1C: 10.6 % (11-19 @ 06:36)  11.0 % (11-18 @ 17:02)    CAPILLARY BLOOD GLUCOSE      POCT Blood Glucose.: 248 mg/dL (25 Nov 2019 11:38)  POCT Blood Glucose.: 254 mg/dL (25 Nov 2019 06:44)  POCT Blood Glucose.: 153 mg/dL (24 Nov 2019 21:32)  POCT Blood Glucose.: 218 mg/dL (24 Nov 2019 17:55)      Direct LDL: 126 mg/dL (11-18-19 @ 17:02)

## 2019-11-25 NOTE — PROGRESS NOTE ADULT - ATTENDING COMMENTS
A/P 34yFemale with hx of DM presenting for management of CAD now s/p CABG    1.  DM: type 2, uncontrolled, complicated,   would transition off insulin infusion with 15 units NPH tomorrow morning, start 5 units lispro with meals.   Continue lispro moderate dose scale with meals and at bedtime.   Continue consistent carb diet  FSG Goal 100-180    2.  Hyperlipidemia - goal LDL < 70  continue atorvastatin 80mg daily,     3.  Obesity - outpatient medical management.    consider GLP-1 agonist.     For discharge:     Pt is advised to follow up with me at discharge by calling .
A/P 34yFemale with hx of DM presenting for management of CAD now s/p CABG with continued hyperglycemia.    1.  DM: type 2, uncontrolled, complicated  conitnue lantus 36 units at bedtime, humalog 12 units TID with meals.   Continue lispro moderate dose scale with meals and at bedtime.   Continue consistent carb diet  FSG Goal 100-180    2.  Hyperlipidemia - goal LDL < 70  continue atorvastatin 40mg daily    3.  Obesity - outpatient medical management.    consider GLP-1 agonist outpatient,     For discharge:   continue above regimen and start metformin 1000mg twice daily and jardiance 10mg once daily  Pt is advised to follow up with me at discharge by calling .
A/P 34yFemale with hx of DM presenting for management of CAD now sp CABG with intermittent hyperglycemia.     1.  DM: type 2, uncontrolled, complicated.   continue lantus 30 units at bedtime, 10 units TID with meals.   Continue lispro moderate dose scale with meals and at bedtime.   Continue consistent carb diet  FSG Goal 100-180    2.  Hyperlipidemia - goal LDL < 70  continue statin    3.  Obesity - outpatient medical management.    consider GLP-1 agonist,       Pt is advised to follow up with me at discharge by calling .
A/P 34yFemale with hx of DM presenting for management of CAD planned for CABG tomorrow.     1.  DM: type 2, uncontrolled, complicated.   increase lantus to 30 units at bedtime, no adjustment for NPO status  lispro 10 units TID with meals.   Continue lispro moderate dose scale with meals and at bedtime.   Continue consistent carb diet  FSG Goal 100-180    2.  Hyperlipidemia - goal LDL < 70  continue statin    3.  Obesity - outpatient medical management.    GLP-1 agonist would likely be beneficial    4.  Hirsutism - can consider starting spironolactone 50mg once daily    Pt is advised to follow up with me at discharge by calling .
A/P 34yFemale with hx of DM presenting for CAD now sp CABG with continued moderate hyperglycemia    1.  DM: type 2, uncontrolled, complicated.   continue 30 units lantus at bedtime, 10 units lispro TID with meals.   Continue lispro moderate dose scale with meals and at bedtime.   Continue consistent carb diet  FSG Goal 100-180    2.  Hyperlipidemia - goal LDL < 70  consider increase to 80mg atorvastatin    3.  Obesity - outpatient medical management.      Pt is advised to follow up with me at discharge by calling .
A/P: 34y Female with hx of DM presenting for management of CAD, now POD #1 from CABG with severe hyperglycemia.     1.  DM - type 2, uncontrolled, complicated.   can continue on insulin infusion and transition off with 30 units lantus at 10pm, and stop insulin infusion at midnight.   Start 10 units lispro TId with meals  Lispro moderate dose scale with meals and at bedtime.   Continue consistent carb diet, Nutrition consult  dilute all medications in NS instead of D5 when possible.   Ensure correct injection and FSG technique    2.  Hyperlipidemia - LDL goal < 70  continue statin    3.  Obesity - outpatient medical management.   GLP-1 agonist.     Pt is advised to follow up with me at discharge.  Please call  to schedule an appointment.
See PA note written above, for details. I reviewed the PA documentation.  I reviewed vitals, labs, medications, cardiac studies and additional imaging.  I agree with the PA's findings and plans as written above with the following additions/amendments:  34F FORMER SMOKER with Uncontrolled Type 2 DM HbA1c 11%, and GERD who transferred from [place] for management of NSTEMI.  [facility] with multivessel CAD. CTSx consulted for CABG. Patient undergoing pre operative testing.  ROS: Patient denies cardiopulmonary symptoms. The patient specifically denies CP, SOB, MALONEY, LH, dizziness, palpitations.  Physical Exam notable for: young female in bed in NAD, flat neck veins, RRR, no MGR detected, clear lungs, overly nourished abdomen, NTTP, no pretibial pitting edema, no ankle edema, skin WWP throughout, A&Ox3, independently ambulatory    Plan for:  Heparin gtt pending CTsx  ASA 81mg daily  High intensity statin Atorva 80  Metoprolol 25 BID  Awaiting TTE for structural assessment  Pre operative testing underway  Endocrine following for uncontrolled DM A1c 11%  Smoking cessation counseling given  Patient to be referred to cardiac rehabilitation upon discharge for cardiac conditioning  NPO pMN tonight. Anticipated CTSx Thursday 11/21/19 with Dr. Frankie Ness M.D.  Cardiology Attending
See original addendum to HPI for initial contact 11/19/19. See PA note written above, for details. I reviewed the PA documentation.  I reviewed vitals, labs, medications, cardiac studies and additional imaging.  I agree with the PA's findings and plans as written above with the following additions/amendments:  34F FORMER SMOKER with Uncontrolled Type 2 DM HbA1c 11%, and GERD who transferred from [place] for management of NSTEMI.  [facility] with multivessel CAD. CTSx consulted.  ROS: Patient denies cardiopulmonary symptoms. The patient specifically denies CP, SOB, MALONEY, LH, dizziness, palpitations.  Physical Exam notable for: young female sitting up in bed in NAD, flat neck veins, RRR, no MGR detected, clear lungs, overly nourished abdomen, NTTP, no pretibial pitting edema, no ankle edema, skin WWP throughout, A&Ox3    Plan for:  Heparin gtt pending CTsx  ASA 81mg daily  High intensity statin Atorva 80  Metoprolol 25 BID  Obtain TTE for structural assessment  Pre operative testing ordered  CTSx consulting for CABG evaluation  Endocrine consultation for uncontrolled DM A1c 11%  Smoking cessation counseling  Patient to be referred to cardiac rehabilitation upon discharge for cardiac conditioning  JAMESON Ness.  Cardiology Attending

## 2019-11-25 NOTE — PROGRESS NOTE ADULT - SUBJECTIVE AND OBJECTIVE BOX
Patient discussed on morning rounds with Dr. Hernandez     Operation / Date: OPCAB x 3 LIMA-LAD, SHARMILA y-graft- OM, radial -ELISHA, 11/121/19    Surgeon: Dr. Hesham David    Referring Physician: Dr. Kurt Lucero    SUBJECTIVE ASSESSMENT:  34 year old female who said she is ready to go home today but voiced her concern that her fsg was in the 200's even before eating anything.  Patient wants to follow with Dr. Sapp as an outpatient, since she is more familiar with her glucose management.  Denies shortness of breath, vomiting, dizziness, syncope, diarrhea.      Hospital Course:    Vital Signs Last 24 Hrs  T(C): 37.2 (25 Nov 2019 09:00), Max: 37.2 (25 Nov 2019 09:00)  T(F): 98.9 (25 Nov 2019 09:00), Max: 98.9 (25 Nov 2019 09:00)  HR: 88 (25 Nov 2019 09:00) (86 - 104)  BP: 119/69 (25 Nov 2019 09:00) (113/62 - 141/83)  BP(mean): 90 (25 Nov 2019 09:00) (81 - 103)  RR: 18 (25 Nov 2019 09:00) (17 - 27)  SpO2: 98% (25 Nov 2019 09:00) (96% - 98%)  I&O's Detail    24 Nov 2019 07:01  -  25 Nov 2019 07:00  --------------------------------------------------------  IN:    Oral Fluid: 625 mL  Total IN: 625 mL    OUT:    Voided: 300 mL  Total OUT: 300 mL    Total NET: 325 mL    EPICARDIAL WIRES REMOVED: Yes  TIE DOWNS REMOVED: Yes    PHYSICAL EXAM:    Physical Exam  General: Patient seen OOB in NAD  Neurological:  Alert and oriented x3, no gross deficits appreciated                   Cardiovascular:     RRR, S1S2, no murmur  Respiratory:  equal breath sounds, no rales, no rhonchi, no wheeze  Gastrointestinal:  soft, nontender, positive bowel sounds  Extremities:  no edema  Vascular:    all pulses  intact (radial, femoral, DP/PT)  Incision: sternotomy stable, no click, c/d/i; chest tube sites no dehiscence, LUE radial harvest site with ecchymosis  mostly at the wrist, no swelling, incision c/d/i.        LABS:                        9.2    9.28  )-----------( 293      ( 25 Nov 2019 06:14 )             28.3       COUMADIN: No.            PT/INR - ( 24 Nov 2019 03:38 )   PT: 14.5 sec;   INR: 1.27          PTT - ( 24 Nov 2019 03:38 )  PTT:29.6 sec    11-25    134<L>  |  101  |  18  ----------------------------<  214<H>  3.9   |  23  |  0.48<L>    Ca    9.0      25 Nov 2019 06:14  Phos  2.6     11-24  Mg     2.0     11-25    TPro  6.5  /  Alb  3.6  /  TBili  1.0  /  DBili  x   /  AST  19  /  ALT  13  /  AlkPhos  51  11-24    MEDICATIONS  (STANDING):  Please refer to Med Rec      Discharge CXR: < from: Xray Chest 2 Views PA/Lat (11.25.19 @ 08:44) >  Findings/  impression: Congestive changes, resolved. Trace pleural effusions and   basilar focal atelectasis.. Stable cardiomegaly status post CABG.   Mediastinum and thorax unremarkable.    < end of copied text >      Discharge ECHO: no post op images Patient discussed on morning rounds with Dr. Hernandez     Operation / Date: OPCAB x 3 LIMA-LAD, SHARMILA y-graft- OM, radial -ELISHA, 11/121/19    Surgeon: Dr. Hesham David    Referring Physician: Dr. Kurt Lucero    SUBJECTIVE ASSESSMENT:  34 year old female who said she is ready to go home today but voiced her concern that her fsg was in the 200's even before eating anything.  Patient wants to follow with Dr. Sapp as an outpatient, since she is more familiar with her glucose management.  Denies shortness of breath, vomiting, dizziness, syncope, diarrhea.      Hospital Course:  This is a 34 year old female, current Smoker, + Marijuana user with strong a strong family history of CAD ( MI mom at age 40; CABG mom at age 50); past medical history significant for poorly controlled Type 2 DM, arthritis, GERD who initially presented to [facility] 11/17/19 c/o CP X 1 day. Patient reported that Saturday night she had severe chest pain that woke her up from her sleep, which was mid-sternal "pressure-burning" like sensation, 10/10, radiating to bilateral upper extremity associated with SOB.  She first thought that it might be secondary to her GERD and took protonix and milk with no improvement in the symptoms which prompted her to go to the ED.  At baseline, patient reports having chest pain associated with SOB on climbing 2-3 flights of stairs worsening with exertion and relieved with rest for the last few months and was waiting for a referral to a cardiologist from her PCP.  Denies dizziness, diaphoresis, fatigue, LE edema, palpitations, orthopnea, PND, syncope at [facility].   EKG as per [facility] paperwork NSR with no ST-T changes; labs showed positive Troponin I, positive THC in urine; she was ruled in for NSTEMI and was admitted for further management.  A Heparin infusion was initiated and she was loaded with  mg PO/Plavix 300 mg PO on 11/17/19.  The following day,  patient was transferred to [facility] for a Cardiac Catheterization secondary to patient's risk factors with a CCS class 4  symptoms and NSTEMI which showed 3VCAD; mLAD: 90 %, D1: 80 %, OM2: 99 %, RPL: 99 %, via a Right Radial access. On 11/21/19, patient underwent an uncomplicated OPCAB x 3 LIMA-LAD, SHARMILA y-graft- OM, radial -ELISHA, EF60% and was extubated POD 0.    POD 1 , chest tubes were removed without complications.  Beta blocker was started to titrate the nicardipine infusion off on and later in the day an insulin drip was restarted for hyperglycemia POD 1.   The Insulin infusion was transitioned to long acting insulin with better blood glucose control as recommended by the Endocrinologist, Dr. Sapp POD 2.  And, on POD 3, patient was transferred to floor care.  POD 4  mild dilutional hyponatremia secondary to hyperglycemia. Patient remains hemodynamically stable ready for discharge  home today    Vital Signs Last 24 Hrs  T(C): 37.2 (25 Nov 2019 09:00), Max: 37.2 (25 Nov 2019 09:00)  T(F): 98.9 (25 Nov 2019 09:00), Max: 98.9 (25 Nov 2019 09:00)  HR: 88 (25 Nov 2019 09:00) (86 - 104)  BP: 119/69 (25 Nov 2019 09:00) (113/62 - 141/83)  BP(mean): 90 (25 Nov 2019 09:00) (81 - 103)  RR: 18 (25 Nov 2019 09:00) (17 - 27)  SpO2: 98% (25 Nov 2019 09:00) (96% - 98%)  I&O's Detail    24 Nov 2019 07:01  -  25 Nov 2019 07:00  --------------------------------------------------------  IN:    Oral Fluid: 625 mL  Total IN: 625 mL    OUT:    Voided: 300 mL  Total OUT: 300 mL    Total NET: 325 mL    EPICARDIAL WIRES REMOVED: Yes  TIE DOWNS REMOVED: Yes    PHYSICAL EXAM:    Physical Exam  General: Patient seen OOB in NAD  Neurological:  Alert and oriented x3, no gross deficits appreciated                   Cardiovascular:     RRR, S1S2, no murmur  Respiratory:  equal breath sounds, no rales, no rhonchi, no wheeze  Gastrointestinal:  soft, nontender, positive bowel sounds  Extremities:  no edema  Vascular:    all pulses  intact (radial, femoral, DP/PT)  Incision: sternotomy stable, no click, c/d/i; chest tube sites no dehiscence, LUE radial harvest site with ecchymosis  mostly at the wrist, no swelling, incision c/d/i.        LABS:                        9.2    9.28  )-----------( 293      ( 25 Nov 2019 06:14 )             28.3       COUMADIN: No.            PT/INR - ( 24 Nov 2019 03:38 )   PT: 14.5 sec;   INR: 1.27          PTT - ( 24 Nov 2019 03:38 )  PTT:29.6 sec    11-25    134<L>  |  101  |  18  ----------------------------<  214<H>  3.9   |  23  |  0.48<L>    Ca    9.0      25 Nov 2019 06:14  Phos  2.6     11-24  Mg     2.0     11-25    TPro  6.5  /  Alb  3.6  /  TBili  1.0  /  DBili  x   /  AST  19  /  ALT  13  /  AlkPhos  51  11-24    MEDICATIONS  (STANDING):  Please refer to Med Rec      Discharge CXR: < from: Xray Chest 2 Views PA/Lat (11.25.19 @ 08:44) >  Findings/  impression: Congestive changes, resolved. Trace pleural effusions and   basilar focal atelectasis.. Stable cardiomegaly status post CABG.   Mediastinum and thorax unremarkable.    < end of copied text >      Discharge ECHO: no post op images Patient discussed on morning rounds with Dr. Hernandez     Operation / Date: OPCAB x 3 LIMA-LAD, SHARMILA y-graft- OM, radial -ELISHA, 11/121/19    Surgeon: Dr. Hesham David    Referring Physician: Dr. Kurt Lucero    SUBJECTIVE ASSESSMENT:  34 year old female who said she is ready to go home today but voiced her concern that her fsg was in the 200's even before eating anything.  Patient wants to follow with Dr. Sapp as an outpatient, since she is more familiar with her glucose management.  Denies shortness of breath, vomiting, dizziness, syncope, diarrhea.      Hospital Course:  This is a 34 year old female, current Smoker, + Marijuana user with strong a strong family history of CAD ( MI mom at age 40; CABG mom at age 50); past medical history significant for poorly controlled Type 2 DM, arthritis, GERD who initially presented to [facility] 11/17/19 c/o CP X 1 day. Patient reported that Saturday night she had severe chest pain that woke her up from her sleep, which was mid-sternal "pressure-burning" like sensation, 10/10, radiating to bilateral upper extremity associated with SOB.  She first thought that it might be secondary to her GERD and took protonix and milk with no improvement in the symptoms which prompted her to go to the ED.  At baseline, patient reports having chest pain associated with SOB on climbing 2-3 flights of stairs worsening with exertion and relieved with rest for the last few months and was waiting for a referral to a cardiologist from her PCP.  Denies dizziness, diaphoresis, fatigue, LE edema, palpitations, orthopnea, PND, syncope at [facility].   EKG as per [facility] paperwork NSR with no ST-T changes; labs showed positive Troponin I, positive THC in urine; she was ruled in for NSTEMI and was admitted for further management.  A Heparin infusion was initiated and she was loaded with  mg PO/Plavix 300 mg PO on 11/17/19.  The following day,  patient was transferred to [facility] for a Cardiac Catheterization secondary to patient's risk factors with CCS class 4  symptoms and NSTEMI which showed 3VCAD; mLAD: 90 %, D1: 80 %, OM2: 99 %, RPL: 99 %, via a Right Radial access. On 11/21/19, patient underwent an uncomplicated OPCAB x 3 LIMA-LAD, SHARMILA y-graft- OM, radial -ELISHA, EF60% and was extubated POD 0.    POD 1 , chest tubes were removed without complications.  Beta blocker was started to titrate the nicardipine infusion off on and later in the day an insulin drip was restarted for hyperglycemia POD 1.  The Insulin infusion was transitioned to long acting insulin with better blood glucose control as recommended by the Endocrinologist, Dr. Sapp POD 2.  And, on POD 3, patient was transferred to floor care.  POD 4  mild dilutional hyponatremia secondary to hyperglycemia. Patient remains hemodynamically stable ready for discharge  home today    Vital Signs Last 24 Hrs  T(C): 37.2 (25 Nov 2019 09:00), Max: 37.2 (25 Nov 2019 09:00)  T(F): 98.9 (25 Nov 2019 09:00), Max: 98.9 (25 Nov 2019 09:00)  HR: 88 (25 Nov 2019 09:00) (86 - 104)  BP: 119/69 (25 Nov 2019 09:00) (113/62 - 141/83)  BP(mean): 90 (25 Nov 2019 09:00) (81 - 103)  RR: 18 (25 Nov 2019 09:00) (17 - 27)  SpO2: 98% (25 Nov 2019 09:00) (96% - 98%)  I&O's Detail    24 Nov 2019 07:01  -  25 Nov 2019 07:00  --------------------------------------------------------  IN:    Oral Fluid: 625 mL  Total IN: 625 mL    OUT:    Voided: 300 mL  Total OUT: 300 mL    Total NET: 325 mL    EPICARDIAL WIRES REMOVED: Yes  TIE DOWNS REMOVED: Yes    PHYSICAL EXAM:    Physical Exam  General: Patient seen OOB in NAD  Neurological:  Alert and oriented x3, no gross deficits appreciated                   Cardiovascular:     RRR, S1S2, no murmur  Respiratory:  equal breath sounds, no rales, no rhonchi, no wheeze  Gastrointestinal:  soft, nontender, positive bowel sounds  Extremities:  no edema  Vascular:    all pulses  intact (radial, femoral, DP/PT)  Incision: sternotomy stable, no click, c/d/i; chest tube sites no dehiscence, LUE radial harvest site with ecchymosis  mostly at the wrist, no swelling, incision c/d/i.        LABS:                        9.2    9.28  )-----------( 293      ( 25 Nov 2019 06:14 )             28.3       COUMADIN: No.            PT/INR - ( 24 Nov 2019 03:38 )   PT: 14.5 sec;   INR: 1.27          PTT - ( 24 Nov 2019 03:38 )  PTT:29.6 sec    11-25    134<L>  |  101  |  18  ----------------------------<  214<H>  3.9   |  23  |  0.48<L>    Ca    9.0      25 Nov 2019 06:14  Phos  2.6     11-24  Mg     2.0     11-25    TPro  6.5  /  Alb  3.6  /  TBili  1.0  /  DBili  x   /  AST  19  /  ALT  13  /  AlkPhos  51  11-24    MEDICATIONS  (STANDING):  Please refer to Med Rec      Discharge CXR: < from: Xray Chest 2 Views PA/Lat (11.25.19 @ 08:44) >  Findings/  impression: Congestive changes, resolved. Trace pleural effusions and   basilar focal atelectasis.. Stable cardiomegaly status post CABG.   Mediastinum and thorax unremarkable.    < end of copied text >      Discharge ECHO: no post op images

## 2019-11-26 PROBLEM — Z86.39 HISTORY OF HYPERLIPIDEMIA: Status: RESOLVED | Noted: 2019-11-26 | Resolved: 2019-11-26

## 2019-11-26 PROBLEM — Z86.39 HISTORY OF DIABETES MELLITUS: Status: RESOLVED | Noted: 2019-11-26 | Resolved: 2019-11-26

## 2019-11-26 PROBLEM — Z86.79 HISTORY OF HYPERTENSION: Status: RESOLVED | Noted: 2019-11-26 | Resolved: 2019-11-26

## 2019-11-27 PROBLEM — I25.2 H/O NON-ST ELEVATION MYOCARDIAL INFARCTION (NSTEMI): Status: RESOLVED | Noted: 2019-11-27 | Resolved: 2019-11-27

## 2019-11-27 PROBLEM — F17.200 CURRENT EVERY DAY SMOKER: Status: ACTIVE | Noted: 2019-11-27

## 2019-11-27 PROBLEM — Z87.898 HISTORY OF MARIJUANA USE: Status: ACTIVE | Noted: 2019-11-27

## 2019-11-27 PROBLEM — Z87.19 HISTORY OF GASTROESOPHAGEAL REFLUX (GERD): Status: RESOLVED | Noted: 2019-11-27 | Resolved: 2019-11-27

## 2019-11-27 RX ORDER — INSULIN LISPRO 100 [IU]/ML
100 INJECTION, SOLUTION INTRAVENOUS; SUBCUTANEOUS
Refills: 0 | Status: ACTIVE | COMMUNITY

## 2019-11-27 RX ORDER — METOPROLOL TARTRATE 25 MG/1
25 TABLET, FILM COATED ORAL
Refills: 0 | Status: ACTIVE | COMMUNITY

## 2019-11-27 RX ORDER — ATORVASTATIN CALCIUM 40 MG/1
40 TABLET, FILM COATED ORAL
Qty: 30 | Refills: 6 | Status: ACTIVE | COMMUNITY

## 2019-11-27 RX ORDER — METFORMIN HYDROCHLORIDE 1000 MG/1
1000 TABLET, COATED ORAL
Qty: 180 | Refills: 3 | Status: ACTIVE | COMMUNITY

## 2019-11-27 RX ORDER — ASPIRIN 81 MG
81 TABLET, DELAYED RELEASE (ENTERIC COATED) ORAL DAILY
Qty: 1 | Refills: 5 | Status: ACTIVE | COMMUNITY

## 2019-11-27 RX ORDER — CLOPIDOGREL BISULFATE 75 MG/1
75 TABLET, FILM COATED ORAL DAILY
Refills: 0 | Status: ACTIVE | COMMUNITY

## 2019-11-27 RX ORDER — EMPAGLIFLOZIN 10 MG/1
10 TABLET, FILM COATED ORAL DAILY
Qty: 30 | Refills: 6 | Status: ACTIVE | COMMUNITY

## 2019-11-27 RX ORDER — INSULIN GLARGINE 100 [IU]/ML
100 INJECTION, SOLUTION SUBCUTANEOUS AT BEDTIME
Refills: 0 | Status: ACTIVE | COMMUNITY

## 2019-11-27 RX ORDER — PANTOPRAZOLE SODIUM 40 MG/1
40 TABLET, DELAYED RELEASE ORAL DAILY
Refills: 0 | Status: ACTIVE | COMMUNITY

## 2019-11-29 DIAGNOSIS — I50.32 CHRONIC DIASTOLIC (CONGESTIVE) HEART FAILURE: ICD-10-CM

## 2019-11-29 DIAGNOSIS — I21.4 NON-ST ELEVATION (NSTEMI) MYOCARDIAL INFARCTION: ICD-10-CM

## 2019-11-29 DIAGNOSIS — Z79.84 LONG TERM (CURRENT) USE OF ORAL HYPOGLYCEMIC DRUGS: ICD-10-CM

## 2019-11-29 DIAGNOSIS — K21.9 GASTRO-ESOPHAGEAL REFLUX DISEASE WITHOUT ESOPHAGITIS: ICD-10-CM

## 2019-11-29 DIAGNOSIS — F17.210 NICOTINE DEPENDENCE, CIGARETTES, UNCOMPLICATED: ICD-10-CM

## 2019-11-29 DIAGNOSIS — E87.2 ACIDOSIS: ICD-10-CM

## 2019-11-29 DIAGNOSIS — E78.5 HYPERLIPIDEMIA, UNSPECIFIED: ICD-10-CM

## 2019-11-29 DIAGNOSIS — E11.65 TYPE 2 DIABETES MELLITUS WITH HYPERGLYCEMIA: ICD-10-CM

## 2019-11-29 DIAGNOSIS — Z82.49 FAMILY HISTORY OF ISCHEMIC HEART DISEASE AND OTHER DISEASES OF THE CIRCULATORY SYSTEM: ICD-10-CM

## 2019-11-29 DIAGNOSIS — E87.1 HYPO-OSMOLALITY AND HYPONATREMIA: ICD-10-CM

## 2019-11-29 DIAGNOSIS — L68.0 HIRSUTISM: ICD-10-CM

## 2019-11-29 DIAGNOSIS — I11.0 HYPERTENSIVE HEART DISEASE WITH HEART FAILURE: ICD-10-CM

## 2019-11-29 DIAGNOSIS — E66.9 OBESITY, UNSPECIFIED: ICD-10-CM

## 2019-11-29 DIAGNOSIS — I25.10 ATHEROSCLEROTIC HEART DISEASE OF NATIVE CORONARY ARTERY WITHOUT ANGINA PECTORIS: ICD-10-CM

## 2019-11-29 DIAGNOSIS — F12.99 CANNABIS USE, UNSPECIFIED WITH UNSPECIFIED CANNABIS-INDUCED DISORDER: ICD-10-CM

## 2019-12-01 ENCOUNTER — OUTPATIENT (OUTPATIENT)
Dept: OUTPATIENT SERVICES | Facility: HOSPITAL | Age: 34
LOS: 1 days | End: 2019-12-01
Payer: MEDICAID

## 2019-12-01 DIAGNOSIS — Z98.890 OTHER SPECIFIED POSTPROCEDURAL STATES: Chronic | ICD-10-CM

## 2019-12-01 DIAGNOSIS — Z30.2 ENCOUNTER FOR STERILIZATION: Chronic | ICD-10-CM

## 2019-12-01 DIAGNOSIS — Z98.891 HISTORY OF UTERINE SCAR FROM PREVIOUS SURGERY: Chronic | ICD-10-CM

## 2019-12-01 PROCEDURE — G9001: CPT

## 2019-12-02 ENCOUNTER — FORM ENCOUNTER (OUTPATIENT)
Age: 34
End: 2019-12-02

## 2019-12-03 ENCOUNTER — APPOINTMENT (OUTPATIENT)
Dept: CARDIOTHORACIC SURGERY | Facility: CLINIC | Age: 34
End: 2019-12-03
Payer: MEDICAID

## 2019-12-03 ENCOUNTER — OUTPATIENT (OUTPATIENT)
Dept: OUTPATIENT SERVICES | Facility: HOSPITAL | Age: 34
LOS: 1 days | End: 2019-12-03
Payer: MEDICAID

## 2019-12-03 VITALS
RESPIRATION RATE: 18 BRPM | WEIGHT: 191 LBS | SYSTOLIC BLOOD PRESSURE: 129 MMHG | OXYGEN SATURATION: 97 % | BODY MASS INDEX: 28.29 KG/M2 | HEIGHT: 69 IN | HEART RATE: 91 BPM | TEMPERATURE: 96.8 F | DIASTOLIC BLOOD PRESSURE: 75 MMHG

## 2019-12-03 DIAGNOSIS — Z86.39 PERSONAL HISTORY OF OTHER ENDOCRINE, NUTRITIONAL AND METABOLIC DISEASE: ICD-10-CM

## 2019-12-03 DIAGNOSIS — Z87.898 PERSONAL HISTORY OF OTHER SPECIFIED CONDITIONS: ICD-10-CM

## 2019-12-03 DIAGNOSIS — Z87.19 PERSONAL HISTORY OF OTHER DISEASES OF THE DIGESTIVE SYSTEM: ICD-10-CM

## 2019-12-03 DIAGNOSIS — Z98.891 HISTORY OF UTERINE SCAR FROM PREVIOUS SURGERY: Chronic | ICD-10-CM

## 2019-12-03 DIAGNOSIS — Z86.79 PERSONAL HISTORY OF OTHER DISEASES OF THE CIRCULATORY SYSTEM: ICD-10-CM

## 2019-12-03 DIAGNOSIS — Z87.09 PERSONAL HISTORY OF OTHER DISEASES OF THE RESPIRATORY SYSTEM: ICD-10-CM

## 2019-12-03 DIAGNOSIS — F17.200 NICOTINE DEPENDENCE, UNSPECIFIED, UNCOMPLICATED: ICD-10-CM

## 2019-12-03 DIAGNOSIS — Z98.890 OTHER SPECIFIED POSTPROCEDURAL STATES: Chronic | ICD-10-CM

## 2019-12-03 DIAGNOSIS — I25.2 OLD MYOCARDIAL INFARCTION: ICD-10-CM

## 2019-12-03 DIAGNOSIS — Z30.2 ENCOUNTER FOR STERILIZATION: Chronic | ICD-10-CM

## 2019-12-03 PROCEDURE — 71046 X-RAY EXAM CHEST 2 VIEWS: CPT

## 2019-12-03 PROCEDURE — 71046 X-RAY EXAM CHEST 2 VIEWS: CPT | Mod: 26

## 2019-12-03 PROCEDURE — 99024 POSTOP FOLLOW-UP VISIT: CPT

## 2019-12-05 PROCEDURE — 83605 ASSAY OF LACTIC ACID: CPT

## 2019-12-05 PROCEDURE — C1769: CPT

## 2019-12-05 PROCEDURE — 93306 TTE W/DOPPLER COMPLETE: CPT

## 2019-12-05 PROCEDURE — P9045: CPT

## 2019-12-05 PROCEDURE — 80053 COMPREHEN METABOLIC PANEL: CPT

## 2019-12-05 PROCEDURE — 80048 BASIC METABOLIC PNL TOTAL CA: CPT

## 2019-12-05 PROCEDURE — 83735 ASSAY OF MAGNESIUM: CPT

## 2019-12-05 PROCEDURE — 93880 EXTRACRANIAL BILAT STUDY: CPT

## 2019-12-05 PROCEDURE — 85610 PROTHROMBIN TIME: CPT

## 2019-12-05 PROCEDURE — 80061 LIPID PANEL: CPT

## 2019-12-05 PROCEDURE — 83002 ASSAY OF GONADOTROPIN (LH): CPT

## 2019-12-05 PROCEDURE — 86901 BLOOD TYPING SEROLOGIC RH(D): CPT

## 2019-12-05 PROCEDURE — 84702 CHORIONIC GONADOTROPIN TEST: CPT

## 2019-12-05 PROCEDURE — 71046 X-RAY EXAM CHEST 2 VIEWS: CPT

## 2019-12-05 PROCEDURE — 84132 ASSAY OF SERUM POTASSIUM: CPT

## 2019-12-05 PROCEDURE — 85027 COMPLETE CBC AUTOMATED: CPT

## 2019-12-05 PROCEDURE — 82803 BLOOD GASES ANY COMBINATION: CPT

## 2019-12-05 PROCEDURE — C1894: CPT

## 2019-12-05 PROCEDURE — 86900 BLOOD TYPING SEROLOGIC ABO: CPT

## 2019-12-05 PROCEDURE — 84443 ASSAY THYROID STIM HORMONE: CPT

## 2019-12-05 PROCEDURE — 36415 COLL VENOUS BLD VENIPUNCTURE: CPT

## 2019-12-05 PROCEDURE — 81003 URINALYSIS AUTO W/O SCOPE: CPT

## 2019-12-05 PROCEDURE — 84402 ASSAY OF FREE TESTOSTERONE: CPT

## 2019-12-05 PROCEDURE — 82550 ASSAY OF CK (CPK): CPT

## 2019-12-05 PROCEDURE — 83001 ASSAY OF GONADOTROPIN (FSH): CPT

## 2019-12-05 PROCEDURE — 83036 HEMOGLOBIN GLYCOSYLATED A1C: CPT

## 2019-12-05 PROCEDURE — 93005 ELECTROCARDIOGRAM TRACING: CPT

## 2019-12-05 PROCEDURE — C1887: CPT

## 2019-12-05 PROCEDURE — 85730 THROMBOPLASTIN TIME PARTIAL: CPT

## 2019-12-05 PROCEDURE — 82553 CREATINE MB FRACTION: CPT

## 2019-12-05 PROCEDURE — 84295 ASSAY OF SERUM SODIUM: CPT

## 2019-12-05 PROCEDURE — 97161 PT EVAL LOW COMPLEX 20 MIN: CPT

## 2019-12-05 PROCEDURE — 84681 ASSAY OF C-PEPTIDE: CPT

## 2019-12-05 PROCEDURE — 85025 COMPLETE CBC W/AUTO DIFF WBC: CPT

## 2019-12-05 PROCEDURE — 82533 TOTAL CORTISOL: CPT

## 2019-12-05 PROCEDURE — 86923 COMPATIBILITY TEST ELECTRIC: CPT

## 2019-12-05 PROCEDURE — 82248 BILIRUBIN DIRECT: CPT

## 2019-12-05 PROCEDURE — 84403 ASSAY OF TOTAL TESTOSTERONE: CPT

## 2019-12-05 PROCEDURE — C1889: CPT

## 2019-12-05 PROCEDURE — 82962 GLUCOSE BLOOD TEST: CPT

## 2019-12-05 PROCEDURE — 84100 ASSAY OF PHOSPHORUS: CPT

## 2019-12-05 PROCEDURE — 71045 X-RAY EXAM CHEST 1 VIEW: CPT

## 2019-12-05 PROCEDURE — 86140 C-REACTIVE PROTEIN: CPT

## 2019-12-05 PROCEDURE — 82330 ASSAY OF CALCIUM: CPT

## 2019-12-05 PROCEDURE — 86850 RBC ANTIBODY SCREEN: CPT

## 2019-12-05 PROCEDURE — 84484 ASSAY OF TROPONIN QUANT: CPT

## 2019-12-05 PROCEDURE — C9248: CPT

## 2019-12-05 PROCEDURE — 94150 VITAL CAPACITY TEST: CPT

## 2019-12-05 PROCEDURE — 83880 ASSAY OF NATRIURETIC PEPTIDE: CPT

## 2019-12-11 DIAGNOSIS — Z71.89 OTHER SPECIFIED COUNSELING: ICD-10-CM

## 2019-12-18 ENCOUNTER — APPOINTMENT (OUTPATIENT)
Dept: CARDIOTHORACIC SURGERY | Facility: CLINIC | Age: 34
End: 2019-12-18
Payer: MEDICAID

## 2019-12-18 VITALS
DIASTOLIC BLOOD PRESSURE: 74 MMHG | SYSTOLIC BLOOD PRESSURE: 115 MMHG | RESPIRATION RATE: 18 BRPM | OXYGEN SATURATION: 97 % | TEMPERATURE: 97.3 F | HEART RATE: 92 BPM

## 2019-12-18 VITALS
RESPIRATION RATE: 18 BRPM | HEART RATE: 92 BPM | OXYGEN SATURATION: 97 % | DIASTOLIC BLOOD PRESSURE: 74 MMHG | SYSTOLIC BLOOD PRESSURE: 115 MMHG | TEMPERATURE: 97.3 F

## 2019-12-18 PROCEDURE — 99024 POSTOP FOLLOW-UP VISIT: CPT

## 2019-12-23 NOTE — BRIEF OPERATIVE NOTE - URINE OUTPUT
350 Information: Selecting Yes will display possible errors in your note based on the variables you have selected. This validation is only offered as a suggestion for you. PLEASE NOTE THAT THE VALIDATION TEXT WILL BE REMOVED WHEN YOU FINALIZE YOUR NOTE. IF YOU WANT TO FAX A PRELIMINARY NOTE YOU WILL NEED TO TOGGLE THIS TO 'NO' IF YOU DO NOT WANT IT IN YOUR FAXED NOTE.

## 2019-12-27 ENCOUNTER — APPOINTMENT (OUTPATIENT)
Dept: CARDIOTHORACIC SURGERY | Facility: CLINIC | Age: 34
End: 2019-12-27
Payer: MEDICAID

## 2019-12-27 VITALS
HEART RATE: 101 BPM | WEIGHT: 198 LBS | HEIGHT: 69 IN | TEMPERATURE: 97.5 F | BODY MASS INDEX: 29.33 KG/M2 | RESPIRATION RATE: 18 BRPM | OXYGEN SATURATION: 93 % | SYSTOLIC BLOOD PRESSURE: 140 MMHG | DIASTOLIC BLOOD PRESSURE: 84 MMHG

## 2019-12-27 DIAGNOSIS — I25.10 ATHEROSCLEROTIC HEART DISEASE OF NATIVE CORONARY ARTERY W/OUT ANGINA PECTORIS: ICD-10-CM

## 2019-12-27 PROCEDURE — 99024 POSTOP FOLLOW-UP VISIT: CPT

## 2019-12-27 RX ORDER — SIMVASTATIN 20 MG/1
20 TABLET, FILM COATED ORAL
Qty: 30 | Refills: 0 | Status: COMPLETED | COMMUNITY
Start: 2019-12-05

## 2019-12-27 RX ORDER — DICLOFENAC SODIUM 100 MG/1
100 TABLET, FILM COATED, EXTENDED RELEASE ORAL
Qty: 30 | Refills: 0 | Status: COMPLETED | COMMUNITY
Start: 2019-10-17

## 2019-12-27 RX ORDER — OXYCODONE HYDROCHLORIDE AND ACETAMINOPHEN 5; 325 MG/1; MG/1
5-325 TABLET ORAL
Refills: 0 | Status: COMPLETED | COMMUNITY
End: 2019-12-27

## 2019-12-27 RX ORDER — LANCETS 28 GAUGE
EACH MISCELLANEOUS
Qty: 100 | Refills: 0 | Status: COMPLETED | COMMUNITY
Start: 2019-12-05

## 2019-12-27 RX ORDER — OXYCODONE 5 MG/1
5 TABLET ORAL
Qty: 120 | Refills: 0 | Status: COMPLETED | COMMUNITY
Start: 2019-10-17

## 2019-12-27 RX ORDER — CYCLOBENZAPRINE HYDROCHLORIDE 10 MG/1
10 TABLET, FILM COATED ORAL
Qty: 30 | Refills: 0 | Status: COMPLETED | COMMUNITY
Start: 2019-10-17

## 2019-12-27 RX ORDER — ISOPROPYL ALCOHOL 70 ML/100ML
SWAB TOPICAL
Qty: 100 | Refills: 0 | Status: COMPLETED | COMMUNITY
Start: 2019-12-05

## 2019-12-27 RX ORDER — METFORMIN HYDROCHLORIDE 500 MG/1
500 TABLET, COATED ORAL
Qty: 60 | Refills: 0 | Status: COMPLETED | COMMUNITY
Start: 2019-12-05

## 2019-12-27 RX ORDER — BLOOD SUGAR DIAGNOSTIC
STRIP MISCELLANEOUS
Qty: 50 | Refills: 0 | Status: COMPLETED | COMMUNITY
Start: 2019-12-05

## 2019-12-27 RX ORDER — FAMOTIDINE 20 MG/1
20 TABLET, FILM COATED ORAL
Qty: 30 | Refills: 0 | Status: COMPLETED | COMMUNITY
Start: 2019-07-22

## 2020-01-02 ENCOUNTER — APPOINTMENT (OUTPATIENT)
Dept: CARDIOTHORACIC SURGERY | Facility: CLINIC | Age: 35
End: 2020-01-02
Payer: MEDICAID

## 2020-01-02 VITALS
SYSTOLIC BLOOD PRESSURE: 124 MMHG | HEART RATE: 81 BPM | DIASTOLIC BLOOD PRESSURE: 77 MMHG | BODY MASS INDEX: 29.18 KG/M2 | HEIGHT: 69 IN | RESPIRATION RATE: 18 BRPM | OXYGEN SATURATION: 95 % | TEMPERATURE: 98.2 F | WEIGHT: 197 LBS

## 2020-01-02 PROCEDURE — 99024 POSTOP FOLLOW-UP VISIT: CPT

## 2020-01-02 RX ORDER — CEPHALEXIN 500 MG/1
500 CAPSULE ORAL
Qty: 14 | Refills: 0 | Status: COMPLETED | COMMUNITY
End: 2020-01-02

## 2020-01-02 RX ORDER — TRAMADOL HYDROCHLORIDE 50 MG/1
50 TABLET, COATED ORAL
Qty: 15 | Refills: 0 | Status: COMPLETED | COMMUNITY
Start: 2019-12-27 | End: 2020-01-02

## 2020-01-08 ENCOUNTER — APPOINTMENT (OUTPATIENT)
Dept: CARDIOTHORACIC SURGERY | Facility: CLINIC | Age: 35
End: 2020-01-08
Payer: MEDICAID

## 2020-01-08 VITALS
WEIGHT: 197 LBS | SYSTOLIC BLOOD PRESSURE: 125 MMHG | DIASTOLIC BLOOD PRESSURE: 69 MMHG | HEART RATE: 90 BPM | OXYGEN SATURATION: 96 % | HEIGHT: 69 IN | TEMPERATURE: 96.4 F | RESPIRATION RATE: 19 BRPM | BODY MASS INDEX: 29.18 KG/M2

## 2020-01-08 PROCEDURE — 99024 POSTOP FOLLOW-UP VISIT: CPT

## 2020-01-08 RX ORDER — METOPROLOL SUCCINATE 25 MG/1
25 TABLET, EXTENDED RELEASE ORAL
Qty: 30 | Refills: 0 | Status: ACTIVE | COMMUNITY
Start: 2020-01-02

## 2020-01-08 RX ORDER — GABAPENTIN 100 MG/1
100 CAPSULE ORAL
Qty: 21 | Refills: 0 | Status: ACTIVE | COMMUNITY
Start: 2019-12-30

## 2020-01-14 ENCOUNTER — APPOINTMENT (OUTPATIENT)
Dept: CARDIOTHORACIC SURGERY | Facility: CLINIC | Age: 35
End: 2020-01-14
Payer: MEDICAID

## 2020-01-14 VITALS
DIASTOLIC BLOOD PRESSURE: 84 MMHG | TEMPERATURE: 96.5 F | WEIGHT: 197 LBS | RESPIRATION RATE: 18 BRPM | HEART RATE: 85 BPM | BODY MASS INDEX: 29.18 KG/M2 | HEIGHT: 69 IN | OXYGEN SATURATION: 97 % | SYSTOLIC BLOOD PRESSURE: 134 MMHG

## 2020-01-14 PROCEDURE — 99024 POSTOP FOLLOW-UP VISIT: CPT

## 2020-01-28 ENCOUNTER — APPOINTMENT (OUTPATIENT)
Dept: CARDIOTHORACIC SURGERY | Facility: CLINIC | Age: 35
End: 2020-01-28
Payer: MEDICAID

## 2020-01-28 DIAGNOSIS — T81.32XA DISRUPTION OF INTERNAL OPERATION (SURGICAL) WOUND, NOT ELSEWHERE CLASSIFIED, INITIAL ENCOUNTER: ICD-10-CM

## 2020-01-28 PROCEDURE — 99024 POSTOP FOLLOW-UP VISIT: CPT

## 2020-01-28 RX ORDER — CEPHALEXIN 500 MG/1
500 CAPSULE ORAL 3 TIMES DAILY
Qty: 21 | Refills: 0 | Status: ACTIVE | COMMUNITY
Start: 2020-01-28 | End: 1900-01-01

## 2020-01-29 VITALS
RESPIRATION RATE: 18 BRPM | TEMPERATURE: 97 F | BODY MASS INDEX: 28.8 KG/M2 | WEIGHT: 195 LBS | DIASTOLIC BLOOD PRESSURE: 80 MMHG | OXYGEN SATURATION: 98 % | SYSTOLIC BLOOD PRESSURE: 135 MMHG | HEART RATE: 84 BPM

## 2020-02-04 ENCOUNTER — APPOINTMENT (OUTPATIENT)
Dept: CARDIOTHORACIC SURGERY | Facility: CLINIC | Age: 35
End: 2020-02-04
Payer: MEDICAID

## 2020-02-04 VITALS
SYSTOLIC BLOOD PRESSURE: 122 MMHG | HEIGHT: 69 IN | HEART RATE: 92 BPM | WEIGHT: 194 LBS | OXYGEN SATURATION: 98 % | TEMPERATURE: 97.2 F | BODY MASS INDEX: 28.73 KG/M2 | RESPIRATION RATE: 18 BRPM | DIASTOLIC BLOOD PRESSURE: 80 MMHG

## 2020-02-04 DIAGNOSIS — Z09 ENCOUNTER FOR FOLLOW-UP EXAMINATION AFTER COMPLETED TREATMENT FOR CONDITIONS OTHER THAN MALIGNANT NEOPLASM: ICD-10-CM

## 2020-02-04 DIAGNOSIS — Z95.1 PRESENCE OF AORTOCORONARY BYPASS GRAFT: ICD-10-CM

## 2020-02-04 DIAGNOSIS — Z00.00 ENCOUNTER FOR GENERAL ADULT MEDICAL EXAMINATION W/OUT ABNORMAL FINDINGS: ICD-10-CM

## 2020-02-04 PROCEDURE — 99024 POSTOP FOLLOW-UP VISIT: CPT

## 2020-02-18 ENCOUNTER — APPOINTMENT (OUTPATIENT)
Dept: CARDIOTHORACIC SURGERY | Facility: CLINIC | Age: 35
End: 2020-02-18